# Patient Record
Sex: MALE | Race: ASIAN | NOT HISPANIC OR LATINO | URBAN - METROPOLITAN AREA
[De-identification: names, ages, dates, MRNs, and addresses within clinical notes are randomized per-mention and may not be internally consistent; named-entity substitution may affect disease eponyms.]

---

## 2018-03-19 ENCOUNTER — EMERGENCY (EMERGENCY)
Facility: HOSPITAL | Age: 83
LOS: 1 days | Discharge: HOME | End: 2018-03-19
Attending: EMERGENCY MEDICINE | Admitting: EMERGENCY MEDICINE
Payer: MEDICARE

## 2018-03-19 VITALS
HEART RATE: 65 BPM | RESPIRATION RATE: 18 BRPM | TEMPERATURE: 99 F | DIASTOLIC BLOOD PRESSURE: 65 MMHG | OXYGEN SATURATION: 99 % | SYSTOLIC BLOOD PRESSURE: 138 MMHG

## 2018-03-19 PROCEDURE — 99283 EMERGENCY DEPT VISIT LOW MDM: CPT

## 2018-03-19 NOTE — ED PROVIDER NOTE - ATTENDING CONTRIBUTION TO CARE
DR. HO, ATTENDING MD-  I performed a face to face bedside interview with patient regarding history of present illness, review of symptoms and past medical history. I completed an independent physical exam.  I have discussed patient's plan of care with PA.   Documentation as above in the note.    Kevon: sent from urgent care for evaluation of abnormal EKG in setting of "intractable hiccups" x 1 week.  Patient has no chest pain, no dyspnea, no epigastric pain, no jaw pain, or other signs suggestive to me of ACS.  On EKG, has isolated TWIs in iii and F. In 12/2016 had TWI in III but not F.  I do not feel that the patient is having ACS, but his personal cardiologist will see him tomorrow.  Regarding his hiccups, patient needs a further workup for this and I have explained this to son, including possibly a GI workup for ongoing sx.  But currently is having no hiccups on my exam.  Son reports they are positional, worse when lyign flat, which would further lead me to believe this is not cardiac.  Will d/c home for close outpatient f/u.  On my exam, comfortable appearing, although coughing occasionally.  Lungs clear, no rales.  heart rrr.  No BEULAH

## 2018-03-19 NOTE — ED PROVIDER NOTE - PMH
BPH (benign prostatic hyperplasia)    DM2 (diabetes mellitus, type 2)    HLD (hyperlipidemia)    HTN (hypertension)    Prostate CA

## 2018-03-19 NOTE — ED PROVIDER NOTE - PLAN OF CARE
pls rest, drink plenty of fluids, try to sleep sitting up, f/u with dr. SIDDIQUI in the AM< return for any worsening symptoms or any other concerning symptoms

## 2018-03-19 NOTE — ED PROVIDER NOTE - PROGRESS NOTE DETAILS
DUGLAS VARGAS: I spoke with dr. betancourt his cardiologist, who states that as ekg has nonspecific findings including ITW in v3, unchanged from 1.5 yr ago, pt can be d/c and he will see pt in the office tomorrrow

## 2018-03-19 NOTE — ED PROVIDER NOTE - CARE PLAN
Principal Discharge DX:	Hiccup  Assessment and plan of treatment:	pls rest, drink plenty of fluids, try to sleep sitting up, f/u with dr. SIDDIQUI in the AM< return for any worsening symptoms or any other concerning symptoms

## 2018-03-19 NOTE — ED ADULT TRIAGE NOTE - CHIEF COMPLAINT QUOTE
pt reports hiccups x 7 days saw PMD was given chlorpromazin with no relief. denies chest pain or SOB.

## 2018-03-19 NOTE — ED PROVIDER NOTE - OBJECTIVE STATEMENT
Pt was offered Chinese translational services, prefers for his son to translate.   85y/o M with PMHx of HLD, HTN, DM, BPH, presents to the ED c/o hiccups for 1w. Pt visited to urgent care today, had an EKG performed and was told to present to the ED. Pt was seen by his PMD 5d ago for a cold, was given abx. He had a stress test performed 1w ago. Pt reports his pain is worse with sleeping. Denies chest pain, fevers/chills, nausea/vomiting, SOB, any other complaints. NKDA. Pt was offered Chinese translational services, prefers for his son to translate.   85y/o M with PMHx of HLD, HTN, DM, BPH, presents to the ED c/o hiccups for 1w. Pt visited to urgent care today, had an EKG performed and was told to present to the ED. Pt was seen by his PMD 5d ago for a cold, was given abx, thorazine, which have not helped.  He states that he had a stress test performed 1w ago which was wnl.  Pt reports hhiccuping  is worse with sleeping. Denies any headaches, neck pain, fevers, chills, nausea, vomitting any active chest pain, SOB, abdominal pain, urinary symptoms, any other complaints. NKDA.

## 2019-05-01 PROBLEM — E78.5 HYPERLIPIDEMIA, UNSPECIFIED: Chronic | Status: ACTIVE | Noted: 2018-03-19

## 2019-05-17 ENCOUNTER — APPOINTMENT (OUTPATIENT)
Dept: ORTHOPEDIC SURGERY | Facility: CLINIC | Age: 84
End: 2019-05-17
Payer: MEDICARE

## 2019-05-17 VITALS
WEIGHT: 157 LBS | HEART RATE: 58 BPM | SYSTOLIC BLOOD PRESSURE: 135 MMHG | BODY MASS INDEX: 23.25 KG/M2 | HEIGHT: 69 IN | DIASTOLIC BLOOD PRESSURE: 69 MMHG

## 2019-05-17 PROCEDURE — 73562 X-RAY EXAM OF KNEE 3: CPT | Mod: RT

## 2019-05-17 PROCEDURE — 99204 OFFICE O/P NEW MOD 45 MIN: CPT

## 2019-05-17 PROCEDURE — 72170 X-RAY EXAM OF PELVIS: CPT

## 2019-05-17 NOTE — REVIEW OF SYSTEMS
[Arthralgia] : arthralgia [Joint Stiffness] : joint stiffness [Joint Pain] : joint pain [Joint Swelling] : joint swelling [Negative] : Heme/Lymph

## 2019-05-28 ENCOUNTER — OUTPATIENT (OUTPATIENT)
Dept: OUTPATIENT SERVICES | Facility: HOSPITAL | Age: 84
LOS: 1 days | End: 2019-05-28
Payer: MEDICARE

## 2019-05-28 VITALS
SYSTOLIC BLOOD PRESSURE: 126 MMHG | WEIGHT: 156.09 LBS | HEART RATE: 51 BPM | HEIGHT: 68.5 IN | RESPIRATION RATE: 16 BRPM | OXYGEN SATURATION: 96 % | DIASTOLIC BLOOD PRESSURE: 64 MMHG | TEMPERATURE: 97 F

## 2019-05-28 DIAGNOSIS — Z98.49 CATARACT EXTRACTION STATUS, UNSPECIFIED EYE: Chronic | ICD-10-CM

## 2019-05-28 DIAGNOSIS — R06.83 SNORING: ICD-10-CM

## 2019-05-28 DIAGNOSIS — M17.12 UNILATERAL PRIMARY OSTEOARTHRITIS, LEFT KNEE: ICD-10-CM

## 2019-05-28 DIAGNOSIS — I10 ESSENTIAL (PRIMARY) HYPERTENSION: ICD-10-CM

## 2019-05-28 DIAGNOSIS — E11.9 TYPE 2 DIABETES MELLITUS WITHOUT COMPLICATIONS: ICD-10-CM

## 2019-05-28 LAB
ANION GAP SERPL CALC-SCNC: 17 MMO/L — HIGH (ref 7–14)
APPEARANCE UR: CLEAR — SIGNIFICANT CHANGE UP
BILIRUB UR-MCNC: NEGATIVE — SIGNIFICANT CHANGE UP
BLD GP AB SCN SERPL QL: NEGATIVE — SIGNIFICANT CHANGE UP
BLOOD UR QL VISUAL: NEGATIVE — SIGNIFICANT CHANGE UP
BUN SERPL-MCNC: 27 MG/DL — HIGH (ref 7–23)
CALCIUM SERPL-MCNC: 9.9 MG/DL — SIGNIFICANT CHANGE UP (ref 8.4–10.5)
CHLORIDE SERPL-SCNC: 103 MMOL/L — SIGNIFICANT CHANGE UP (ref 98–107)
CO2 SERPL-SCNC: 23 MMOL/L — SIGNIFICANT CHANGE UP (ref 22–31)
COLOR SPEC: YELLOW — SIGNIFICANT CHANGE UP
CREAT SERPL-MCNC: 1.49 MG/DL — HIGH (ref 0.5–1.3)
GLUCOSE SERPL-MCNC: 212 MG/DL — HIGH (ref 70–99)
GLUCOSE UR-MCNC: 300 — HIGH
HBA1C BLD-MCNC: 7.8 % — HIGH (ref 4–5.6)
HCT VFR BLD CALC: 39.1 % — SIGNIFICANT CHANGE UP (ref 39–50)
HGB BLD-MCNC: 12.4 G/DL — LOW (ref 13–17)
KETONES UR-MCNC: NEGATIVE — SIGNIFICANT CHANGE UP
LEUKOCYTE ESTERASE UR-ACNC: NEGATIVE — SIGNIFICANT CHANGE UP
MCHC RBC-ENTMCNC: 31.5 PG — SIGNIFICANT CHANGE UP (ref 27–34)
MCHC RBC-ENTMCNC: 31.7 % — LOW (ref 32–36)
MCV RBC AUTO: 99.2 FL — SIGNIFICANT CHANGE UP (ref 80–100)
NITRITE UR-MCNC: NEGATIVE — SIGNIFICANT CHANGE UP
NRBC # FLD: 0 K/UL — SIGNIFICANT CHANGE UP (ref 0–0)
PH UR: 5.5 — SIGNIFICANT CHANGE UP (ref 5–8)
PLATELET # BLD AUTO: 172 K/UL — SIGNIFICANT CHANGE UP (ref 150–400)
PMV BLD: 12.1 FL — SIGNIFICANT CHANGE UP (ref 7–13)
POTASSIUM SERPL-MCNC: 3.9 MMOL/L — SIGNIFICANT CHANGE UP (ref 3.5–5.3)
POTASSIUM SERPL-SCNC: 3.9 MMOL/L — SIGNIFICANT CHANGE UP (ref 3.5–5.3)
PROT UR-MCNC: 10 — SIGNIFICANT CHANGE UP
RBC # BLD: 3.94 M/UL — LOW (ref 4.2–5.8)
RBC # FLD: 13 % — SIGNIFICANT CHANGE UP (ref 10.3–14.5)
RH IG SCN BLD-IMP: POSITIVE — SIGNIFICANT CHANGE UP
SODIUM SERPL-SCNC: 143 MMOL/L — SIGNIFICANT CHANGE UP (ref 135–145)
SP GR SPEC: 1.02 — SIGNIFICANT CHANGE UP (ref 1–1.04)
UROBILINOGEN FLD QL: NORMAL — SIGNIFICANT CHANGE UP
WBC # BLD: 5.66 K/UL — SIGNIFICANT CHANGE UP (ref 3.8–10.5)
WBC # FLD AUTO: 5.66 K/UL — SIGNIFICANT CHANGE UP (ref 3.8–10.5)

## 2019-05-28 PROCEDURE — 93010 ELECTROCARDIOGRAM REPORT: CPT

## 2019-05-28 RX ORDER — SODIUM CHLORIDE 9 MG/ML
1000 INJECTION, SOLUTION INTRAVENOUS
Refills: 0 | Status: DISCONTINUED | OUTPATIENT
Start: 2019-07-15 | End: 2019-07-15

## 2019-05-28 RX ORDER — ASPIRIN/CALCIUM CARB/MAGNESIUM 324 MG
1 TABLET ORAL
Qty: 0 | Refills: 0 | DISCHARGE

## 2019-05-28 NOTE — H&P PST ADULT - NEGATIVE CARDIOVASCULAR SYMPTOMS
no palpitations/no peripheral edema/no orthopnea/no chest pain/no dyspnea on exertion/no claudication

## 2019-05-28 NOTE — H&P PST ADULT - GENITOURINARY COMMENTS
October 18, 2018      Jacoby Mujica MD  [address] [place] [address] [place] [facility]  [address] [facility] [address]  Phone: [phone]  Fax: [phone]          Patient: Tello Romero   MR Number: [id number]   YOB: 1999   Date of Visit: 10/18/2018       Dear Dr. Jacoby Mujica:    Thank you for referring Tello Romero to me for evaluation. Attached you will find relevant portions of my assessment and plan of care.    If you have questions, please do not hesitate to call me. I look forward to following Tello Romero along with you.    Sincerely,    Elis Remy MD    Enclosure  CC:  No Recipients    If you would like to receive this communication electronically, please contact [email or web address] or [phone] to request more information on [facility] access.    For providers and/or their staff who would like to refer a patient to [facility], please contact us through our one-stop-shop provider referral line, [facility], at [phone].    If you feel you have received this communication in error or would no longer like to receive these types of communications, please e-mail [email or web address]          hx prostate cancer

## 2019-05-28 NOTE — H&P PST ADULT - SOURCE OF INFORMATION, PROFILE
patient Son Nadir -  as per pt/family/patient patient/Son Nadir -  as per pt -   Christy ID # [id number]/family

## 2019-05-28 NOTE — H&P PST ADULT - RS GEN PE MLT RESP DETAILS PC
breath sounds equal/no rales/clear to auscultation bilaterally/airway patent/no rhonchi/respirations non-labored

## 2019-05-28 NOTE — H&P PST ADULT - NEGATIVE ENMT SYMPTOMS
no nasal congestion/no hearing difficulty/no ear pain/no nasal discharge/no post-nasal discharge/no nose bleeds/no vertigo/no sinus symptoms/no tinnitus

## 2019-05-28 NOTE — H&P PST ADULT - NSANTHOSAYNRD_GEN_A_CORE
No. CLARIBEL screening performed.  STOP BANG Legend: 0-2 = LOW Risk; 3-4 = INTERMEDIATE Risk; 5-8 = HIGH Risk

## 2019-05-28 NOTE — H&P PST ADULT - HISTORY OF PRESENT ILLNESS
85 y/o male with hx HTN, DM Type II, Gerd, Gout, present for preop evaluation for left total knee replacements on 6/4/19. 85 y/o male with hx HTN, DM Type II, Gerd, Gout, present for preop evaluation for left total knee replacements on 6/4/19. Pt states 85 y/o male with hx HTN, DM Type II, Gerd, Gout, present for preop evaluation for left total knee replacements on 6/4/19. Pt has been c/o pain x 3 years, which has gotten significantly worse. Pt was referred to surgeon, had x-ray of left knee & severe OA seen.

## 2019-05-28 NOTE — H&P PST ADULT - PRO PAIN LIFE ADAPT
inability or reluctance to perform ADLs/inability to sleep/decreased activity level/inability to enjoy life

## 2019-05-28 NOTE — H&P PST ADULT - NSICDXPROBLEM_GEN_ALL_CORE_FT
PROBLEM DIAGNOSES  Problem: Osteoarthritis of left knee  Assessment and Plan: scheduled for left total knee replacement on 6/4/19  pending labs & medical clearance   surgical scrub & preop instructions given & explained, pt & his son verbalized understanding    Problem: HTN (hypertension)  Assessment and Plan: instructed to take Losartan & Atenolol AM of surgery with sips of water    Problem: DM (diabetes mellitus)  Assessment and Plan: instructed to take last dose of diabetic meds 6/3/19 & none AM of surgery    Problem: Snores  Assessment and Plan: pt met stop bang criteria for CLARIBEL precautions; OR bookin noitified via fax PROBLEM DIAGNOSES  Problem: Osteoarthritis of left knee  Assessment and Plan: scheduled for left total knee replacement on 6/4/19  pending labs & medical clearance   surgical scrub & preop instructions given & explained, pt & his son verbalized understanding    Problem: HTN (hypertension)  Assessment and Plan: instructed to take Losartan & Atenolol AM of surgery with sips of water    Problem: DM (diabetes mellitus)  Assessment and Plan: instructed to take last dose of diabetic meds 6/3/19 & none AM of surgery  FSBS stat DOS    Problem: Snores  Assessment and Plan: pt met stop bang criteria for CLARIBEL precautions; OR booking noitified via fax

## 2019-05-28 NOTE — H&P PST ADULT - NSICDXPASTMEDICALHX_GEN_ALL_CORE_FT
PAST MEDICAL HISTORY:  BPH (benign prostatic hyperplasia)     DM2 (diabetes mellitus, type 2)     Gout     History of gastroesophageal reflux (GERD)     HLD (hyperlipidemia)     HTN (hypertension)     Prostate CA Radiation seed Implant    Shingles 8 years ago

## 2019-05-29 LAB
SPECIMEN SOURCE: SIGNIFICANT CHANGE UP
SPECIMEN SOURCE: SIGNIFICANT CHANGE UP

## 2019-05-30 LAB
BACTERIA NPH CULT: SIGNIFICANT CHANGE UP
BACTERIA UR CULT: SIGNIFICANT CHANGE UP

## 2019-05-31 ENCOUNTER — RX RENEWAL (OUTPATIENT)
Age: 84
End: 2019-05-31

## 2019-06-03 NOTE — CONSULT LETTER
[Dear  ___] : Dear  [unfilled], [Consult Letter:] : I had the pleasure of evaluating your patient, [unfilled]. [Please see my note below.] : Please see my note below. [Consult Closing:] : Thank you very much for allowing me to participate in the care of this patient.  If you have any questions, please do not hesitate to contact me. [Sincerely,] : Sincerely, [FreeTextEntry3] : Tony Wiggins MD\par \par ______________________________________________\par [facility]: Hip/Knee Arthroplasty\par [address] [facility] [facility] [address], [place] [address]\par (t) [phone]\par (f) [phone]  [FreeTextEntry2] : HANG RCAHEL \par

## 2019-06-03 NOTE — PHYSICAL EXAM
[Antalgic] : antalgic [Ankle] : ankle 2+ and symmetric bilaterally [Knee] : patellar 2+ and symmetric bilaterally [LE] : Sensory: Intact in bilateral lower extremities [DP] : dorsalis pedis 2+ and symmetric bilaterally [PT] : posterior tibial 2+ and symmetric bilaterally [de-identified] : On general examination the patient is adequately groomed and nourished. The vital parameters are as recorded. \par There is no lymphedema or diffuse swelling, no varicose veins, no skin warmth/erythema/scars/swelling, no ulcers and no palpable lymph nodes or masses in both lower extremities. Bilateral pedal pulses are well palpable.\par Upper Extremity:\par Both right and left upper extremities are unremarkable in terms of skin rash, lesions, pigmentation, redness, tenderness, swelling, joint instability, abnormal deformity or crepitus. The overall range of motion, sensation, motor tone and strength testing are normal.\par \par Bilateral Knee Exam: \par The gait is bilateral stiff knee antalgic.\par Knee alignment:            Right 0 degrees varus with no flexion deformity. \par Left 5 degrees varus with no flexion deformity.\par Both knees demonstrate no scars and the skin has no warmth, erythema, swelling or tenderness. \par Both knees have a range of motion of\par Extension:                    Right 0 degrees     Left 0 degrees\par Flexion:                                   Right 90 degrees      Left 110 degrees\par There is bilateral knee medial joint line tenderness. There is bilateral knee mild effusion. \par Justina's test is positive. Inocente test is positive.\par Lachman's test, Anterior/Posterior Drawer test and Pivot Shift Tests are negative. \par There is bilateral knee grade 1 MCL mediolateral laxity and no anteroposterior instability. \par Patella compression test is positive and patellofemoral tracking is normal with no lateral subluxation, apprehension or instability. \par Right knee quadriceps and hamstrings power is 4+.\par Left knee quadriceps and hamstrings power is 4+.\par \par Hip Exam:\par The gait and station is normal\par The patient has equal leg lengths and no pelvic tilt. Solomon/Ca test is 7 inches on the right and 7 inches on the left. Active SLR is 60 degrees on the right and 60 degrees on the left. Both hips demonstrate no scars and the skin has no signs of inflammation or tenderness. \par Both Hips have a normal range of motion of flexion to 100 degrees, abduction 40 degrees, adduction 20 degrees, external rotation 40 degrees, internal rotation 20 degrees with symmetrical motion in flexion and extension. There is no flexion contracture, deformity or instability. Labral impingement tests are negative.\par Both hips flexor, abductor and extensor power is normal. [de-identified] : The following radiographs were ordered and read by me during this patients visit. I reviewed each radiograph in detail with the patient and discussed the findings as highlighted below. \par AP, lateral and skyline views of the bilateral knees confirm advanced degenerative joint disease with medial joint space narrowing and osteophyte formation. Left Knee worse than Right . \par AP view of the pelvis are within normal limits

## 2019-06-03 NOTE — DISCUSSION/SUMMARY
[de-identified] : BIlateral Knee advanced degenerative joint disease, Left worse than Right \par The natural history and treatment of degenerative arthritis was discussed with the patient at length today. The spectrum of treatment including nonoperative modalities to surgical intervention was elucidated. Noninvasive and nonoperative treatment modalities include weight reduction, activity modification with low impact exercise,  as needed use of acetaminophen or anti-inflammatory medications if tolerated, glucosamine/chondroitin supplements, and physical therapy. Further treatments can include corticosteroid injection and the use of viscosupplementation with hyaluronic acid injections. Definitive surgical treatment can certainly include total joint arthroplasty also. The risks and benefits of each treatment options was discussed and all questions were answered.\par In view of lack of adequate pain relief with conservative (non-surgical) management protocol including physical therapy, home exercises, weight loss, activity modification, NSAIDS; the patient is recommended to consider a Left Total Knee Replacement. \par The risks, benefits, alternatives, implications, complications including but not limited to pain, stiffness, bleeding, limp, wound breakdown, infection, bone fracture, nerve and vascular compromise, implant wear, fixation options depending on bone quality, instability, and durability issues and rehabilitation were discussed and relevant questions were addressed. The possibility of recurrent pain, no improvement in pain and actual worsening of the pain were also mentioned in conversation with the patient. Medical complications related to the patient's general medical health including deep vein thrombosis, pulmonary embolus, heart attack, stroke, death and other complications from anesthesia were discussed as well. The patient wishes to proceed and will undergo preoperative medical evaluation and clearance, attend the preoperative educational class and will schedule surgery appropriately.\par Anticoagulation prophylaxis medication options to address risks of deep vein thrombosis and pulmonary embolism were discussed and weighed against the risks of bleeding and wound healing complications. The patient elected aspirin/coumadin prophylaxis with mechanical modalities.

## 2019-06-03 NOTE — HISTORY OF PRESENT ILLNESS
[8] : an average pain level of 8/10 [de-identified] : Mr. MARTHA MELCHOR is a 84 year old male presenting with bilateral knee pain, now worsening. Left knee is worse than right. He localizes the pain to the medial and anterior aspect of the bilateral knee. The patient describes the pain as sharp, and states it is intermittent, based on activity. He states the pain is exacerbated by walking long distance, climbing/descending stairs, and rising from a seated position. He has been taking NSAIDs for pain with only mild temporary relief. He admits to prior conservative management inclusive of physical therapy with only mild improvement in condition. Patient has had a cortisone injection to the Left knee years ago. He denies hip or lower back pain. The patient admits to limitations in there quality of life, and is present to discuss options for treatment. JTM. \par

## 2019-06-10 PROBLEM — B02.9 ZOSTER WITHOUT COMPLICATIONS: Chronic | Status: ACTIVE | Noted: 2019-05-28

## 2019-06-10 PROBLEM — M10.9 GOUT, UNSPECIFIED: Chronic | Status: ACTIVE | Noted: 2019-05-28

## 2019-06-10 PROBLEM — Z87.19 PERSONAL HISTORY OF OTHER DISEASES OF THE DIGESTIVE SYSTEM: Chronic | Status: ACTIVE | Noted: 2019-05-28

## 2019-07-02 ENCOUNTER — OUTPATIENT (OUTPATIENT)
Dept: OUTPATIENT SERVICES | Facility: HOSPITAL | Age: 84
LOS: 1 days | End: 2019-07-02

## 2019-07-02 VITALS
HEART RATE: 51 BPM | WEIGHT: 158.07 LBS | OXYGEN SATURATION: 98 % | HEIGHT: 67 IN | DIASTOLIC BLOOD PRESSURE: 68 MMHG | RESPIRATION RATE: 14 BRPM | SYSTOLIC BLOOD PRESSURE: 128 MMHG | TEMPERATURE: 97 F

## 2019-07-02 DIAGNOSIS — M19.90 UNSPECIFIED OSTEOARTHRITIS, UNSPECIFIED SITE: ICD-10-CM

## 2019-07-02 DIAGNOSIS — M17.11 UNILATERAL PRIMARY OSTEOARTHRITIS, RIGHT KNEE: ICD-10-CM

## 2019-07-02 DIAGNOSIS — Z98.49 CATARACT EXTRACTION STATUS, UNSPECIFIED EYE: Chronic | ICD-10-CM

## 2019-07-02 LAB
ANION GAP SERPL CALC-SCNC: 15 MMO/L — HIGH (ref 7–14)
APPEARANCE UR: CLEAR — SIGNIFICANT CHANGE UP
BILIRUB UR-MCNC: NEGATIVE — SIGNIFICANT CHANGE UP
BLD GP AB SCN SERPL QL: NEGATIVE — SIGNIFICANT CHANGE UP
BLOOD UR QL VISUAL: NEGATIVE — SIGNIFICANT CHANGE UP
BUN SERPL-MCNC: 21 MG/DL — SIGNIFICANT CHANGE UP (ref 7–23)
CALCIUM SERPL-MCNC: 9.6 MG/DL — SIGNIFICANT CHANGE UP (ref 8.4–10.5)
CHLORIDE SERPL-SCNC: 106 MMOL/L — SIGNIFICANT CHANGE UP (ref 98–107)
CO2 SERPL-SCNC: 22 MMOL/L — SIGNIFICANT CHANGE UP (ref 22–31)
COLOR SPEC: SIGNIFICANT CHANGE UP
CREAT SERPL-MCNC: 1.22 MG/DL — SIGNIFICANT CHANGE UP (ref 0.5–1.3)
GLUCOSE SERPL-MCNC: 134 MG/DL — HIGH (ref 70–99)
GLUCOSE UR-MCNC: NEGATIVE — SIGNIFICANT CHANGE UP
HBA1C BLD-MCNC: 7 % — HIGH (ref 4–5.6)
HCT VFR BLD CALC: 41 % — SIGNIFICANT CHANGE UP (ref 39–50)
HGB BLD-MCNC: 13.4 G/DL — SIGNIFICANT CHANGE UP (ref 13–17)
KETONES UR-MCNC: NEGATIVE — SIGNIFICANT CHANGE UP
LEUKOCYTE ESTERASE UR-ACNC: NEGATIVE — SIGNIFICANT CHANGE UP
MCHC RBC-ENTMCNC: 31.8 PG — SIGNIFICANT CHANGE UP (ref 27–34)
MCHC RBC-ENTMCNC: 32.7 % — SIGNIFICANT CHANGE UP (ref 32–36)
MCV RBC AUTO: 97.2 FL — SIGNIFICANT CHANGE UP (ref 80–100)
NITRITE UR-MCNC: NEGATIVE — SIGNIFICANT CHANGE UP
NRBC # FLD: 0 K/UL — SIGNIFICANT CHANGE UP (ref 0–0)
PH UR: 6 — SIGNIFICANT CHANGE UP (ref 5–8)
PLATELET # BLD AUTO: 157 K/UL — SIGNIFICANT CHANGE UP (ref 150–400)
PMV BLD: 12 FL — SIGNIFICANT CHANGE UP (ref 7–13)
POTASSIUM SERPL-MCNC: 3.6 MMOL/L — SIGNIFICANT CHANGE UP (ref 3.5–5.3)
POTASSIUM SERPL-SCNC: 3.6 MMOL/L — SIGNIFICANT CHANGE UP (ref 3.5–5.3)
PROT UR-MCNC: 10 — SIGNIFICANT CHANGE UP
RBC # BLD: 4.22 M/UL — SIGNIFICANT CHANGE UP (ref 4.2–5.8)
RBC # FLD: 13.2 % — SIGNIFICANT CHANGE UP (ref 10.3–14.5)
RH IG SCN BLD-IMP: POSITIVE — SIGNIFICANT CHANGE UP
SODIUM SERPL-SCNC: 143 MMOL/L — SIGNIFICANT CHANGE UP (ref 135–145)
SP GR SPEC: 1.02 — SIGNIFICANT CHANGE UP (ref 1–1.04)
UROBILINOGEN FLD QL: NORMAL — SIGNIFICANT CHANGE UP
WBC # BLD: 6.5 K/UL — SIGNIFICANT CHANGE UP (ref 3.8–10.5)
WBC # FLD AUTO: 6.5 K/UL — SIGNIFICANT CHANGE UP (ref 3.8–10.5)

## 2019-07-02 RX ORDER — ASPIRIN/CALCIUM CARB/MAGNESIUM 324 MG
1 TABLET ORAL
Qty: 0 | Refills: 0 | DISCHARGE

## 2019-07-02 RX ORDER — PIOGLITAZONE HYDROCHLORIDE 15 MG/1
1 TABLET ORAL
Qty: 0 | Refills: 0 | DISCHARGE

## 2019-07-02 RX ORDER — SODIUM CHLORIDE 9 MG/ML
3 INJECTION INTRAMUSCULAR; INTRAVENOUS; SUBCUTANEOUS EVERY 8 HOURS
Refills: 0 | Status: DISCONTINUED | OUTPATIENT
Start: 2019-07-15 | End: 2019-07-17

## 2019-07-02 NOTE — H&P PST ADULT - NEGATIVE GASTROINTESTINAL SYMPTOMS
no melena/no change in bowel habits/no constipation/no nausea/no abdominal pain/no vomiting/no diarrhea

## 2019-07-02 NOTE — H&P PST ADULT - NSICDXPROBLEM_GEN_ALL_CORE_FT
PROBLEM DIAGNOSES  Problem: Osteoarthritis  Assessment and Plan: Pt scheduled for left knee replacement on 7/15/2019.  labs done results pending, ekg in chart from 5/2019.  hibiclens provided:  verbal and written instructions provided, with teach back, pt able to verbalize understanding.  Preop teaching done, pt able to verbalize understanding.       meds day of surgery:  atenolol, omeprazole, losartan     medial and cardiology eval in chart.

## 2019-07-02 NOTE — H&P PST ADULT - CONSTITUTIONAL DETAILS
well-nourished/left knee/well-developed/well-groomed/distress due to pain well-groomed/no distress/well-developed/well-nourished

## 2019-07-02 NOTE — H&P PST ADULT - NEGATIVE BREAST SYMPTOMS
no breast tenderness L/no breast tenderness R/no nipple discharge L/no breast lump L/no breast lump R/no nipple discharge R

## 2019-07-02 NOTE — H&P PST ADULT - RS GEN PE MLT RESP DETAILS PC
clear to auscultation bilaterally/breath sounds equal/airway patent/no rhonchi/no rales/respirations non-labored clear to auscultation bilaterally/breath sounds equal/good air movement/no chest wall tenderness/no intercostal retractions/no rales/no rhonchi/no wheezes/respirations non-labored

## 2019-07-02 NOTE — H&P PST ADULT - NEGATIVE ENMT SYMPTOMS
no tinnitus/no nasal discharge/no post-nasal discharge/no nose bleeds/no dry mouth/no dysphagia/no nasal congestion/no throat pain/no vertigo/no sinus symptoms/no ear pain

## 2019-07-02 NOTE — H&P PST ADULT - NEGATIVE CARDIOVASCULAR SYMPTOMS
no orthopnea/no paroxysmal nocturnal dyspnea/no peripheral edema/no chest pain/no claudication/no palpitations/no dyspnea on exertion

## 2019-07-02 NOTE — H&P PST ADULT - PRO PAIN LIFE ADAPT
decreased activity level/inability to enjoy life/inability or reluctance to perform ADLs/inability to sleep

## 2019-07-02 NOTE — H&P PST ADULT - NEGATIVE GENERAL SYMPTOMS
no malaise/no anorexia/no chills/no sweating/no weight loss/no fever/no weight gain/no polyphagia/no polyuria/no polydipsia/no fatigue

## 2019-07-02 NOTE — H&P PST ADULT - GASTROINTESTINAL DETAILS
no masses palpable/nontender/no distention/bowel sounds normal/soft/no guarding nontender/no distention/no guarding/no organomegaly/bowel sounds normal/no rigidity/no bruit/no masses palpable/soft

## 2019-07-02 NOTE — H&P PST ADULT - MUSCULOSKELETAL COMMENTS
left knee pain with rom, at night left foot discomfort left knee pain with rom, Positive crepitous, arthritic deformity

## 2019-07-02 NOTE — H&P PST ADULT - HISTORY OF PRESENT ILLNESS
83y/o male scheduled for left total knee replacement on 7/15/2019.  Pt states, "left knee pain for the past 3 yrs, xray shows bone on bone."  Was previously scheduled for 6/2019 cancelled due to pending cardiology evaluation.

## 2019-07-03 LAB — SPECIMEN SOURCE: SIGNIFICANT CHANGE UP

## 2019-07-04 LAB
BACTERIA UR CULT: SIGNIFICANT CHANGE UP
SPECIMEN SOURCE: SIGNIFICANT CHANGE UP

## 2019-07-05 LAB — BACTERIA NPH CULT: SIGNIFICANT CHANGE UP

## 2019-07-09 ENCOUNTER — OTHER (OUTPATIENT)
Age: 84
End: 2019-07-09

## 2019-07-10 ENCOUNTER — OTHER (OUTPATIENT)
Age: 84
End: 2019-07-10

## 2019-07-10 DIAGNOSIS — M17.12 UNILATERAL PRIMARY OSTEOARTHRITIS, LEFT KNEE: ICD-10-CM

## 2019-07-10 DIAGNOSIS — M17.11 UNILATERAL PRIMARY OSTEOARTHRITIS, RIGHT KNEE: ICD-10-CM

## 2019-07-14 ENCOUNTER — TRANSCRIPTION ENCOUNTER (OUTPATIENT)
Age: 84
End: 2019-07-14

## 2019-07-15 ENCOUNTER — INPATIENT (INPATIENT)
Facility: HOSPITAL | Age: 84
LOS: 1 days | Discharge: NURSING, REHAB OR LONG-TERM CARE | End: 2019-07-17
Attending: ORTHOPAEDIC SURGERY | Admitting: ORTHOPAEDIC SURGERY
Payer: MEDICARE

## 2019-07-15 ENCOUNTER — RESULT REVIEW (OUTPATIENT)
Age: 84
End: 2019-07-15

## 2019-07-15 ENCOUNTER — APPOINTMENT (OUTPATIENT)
Dept: ORTHOPEDIC SURGERY | Facility: HOSPITAL | Age: 84
End: 2019-07-15

## 2019-07-15 VITALS
WEIGHT: 158.07 LBS | TEMPERATURE: 98 F | SYSTOLIC BLOOD PRESSURE: 151 MMHG | HEIGHT: 67 IN | DIASTOLIC BLOOD PRESSURE: 60 MMHG | RESPIRATION RATE: 15 BRPM | HEART RATE: 46 BPM | OXYGEN SATURATION: 98 %

## 2019-07-15 DIAGNOSIS — M17.12 UNILATERAL PRIMARY OSTEOARTHRITIS, LEFT KNEE: ICD-10-CM

## 2019-07-15 DIAGNOSIS — Z98.49 CATARACT EXTRACTION STATUS, UNSPECIFIED EYE: Chronic | ICD-10-CM

## 2019-07-15 LAB
ANION GAP SERPL CALC-SCNC: 11 MMO/L — SIGNIFICANT CHANGE UP (ref 7–14)
BUN SERPL-MCNC: 22 MG/DL — SIGNIFICANT CHANGE UP (ref 7–23)
CALCIUM SERPL-MCNC: 9.3 MG/DL — SIGNIFICANT CHANGE UP (ref 8.4–10.5)
CHLORIDE SERPL-SCNC: 106 MMOL/L — SIGNIFICANT CHANGE UP (ref 98–107)
CO2 SERPL-SCNC: 27 MMOL/L — SIGNIFICANT CHANGE UP (ref 22–31)
CREAT SERPL-MCNC: 1.29 MG/DL — SIGNIFICANT CHANGE UP (ref 0.5–1.3)
GLUCOSE BLDC GLUCOMTR-MCNC: 129 MG/DL — HIGH (ref 70–99)
GLUCOSE BLDC GLUCOMTR-MCNC: 131 MG/DL — HIGH (ref 70–99)
GLUCOSE BLDC GLUCOMTR-MCNC: 246 MG/DL — HIGH (ref 70–99)
GLUCOSE SERPL-MCNC: 139 MG/DL — HIGH (ref 70–99)
HCT VFR BLD CALC: 39.2 % — SIGNIFICANT CHANGE UP (ref 39–50)
HGB BLD-MCNC: 13.2 G/DL — SIGNIFICANT CHANGE UP (ref 13–17)
MCHC RBC-ENTMCNC: 32.9 PG — SIGNIFICANT CHANGE UP (ref 27–34)
MCHC RBC-ENTMCNC: 33.7 % — SIGNIFICANT CHANGE UP (ref 32–36)
MCV RBC AUTO: 97.8 FL — SIGNIFICANT CHANGE UP (ref 80–100)
NRBC # FLD: 0 K/UL — SIGNIFICANT CHANGE UP (ref 0–0)
PLATELET # BLD AUTO: 153 K/UL — SIGNIFICANT CHANGE UP (ref 150–400)
PMV BLD: 11.5 FL — SIGNIFICANT CHANGE UP (ref 7–13)
POTASSIUM SERPL-MCNC: 4.5 MMOL/L — SIGNIFICANT CHANGE UP (ref 3.5–5.3)
POTASSIUM SERPL-SCNC: 4.5 MMOL/L — SIGNIFICANT CHANGE UP (ref 3.5–5.3)
RBC # BLD: 4.01 M/UL — LOW (ref 4.2–5.8)
RBC # FLD: 12.9 % — SIGNIFICANT CHANGE UP (ref 10.3–14.5)
SODIUM SERPL-SCNC: 144 MMOL/L — SIGNIFICANT CHANGE UP (ref 135–145)
WBC # BLD: 7.46 K/UL — SIGNIFICANT CHANGE UP (ref 3.8–10.5)
WBC # FLD AUTO: 7.46 K/UL — SIGNIFICANT CHANGE UP (ref 3.8–10.5)

## 2019-07-15 PROCEDURE — 27447 TOTAL KNEE ARTHROPLASTY: CPT | Mod: LT

## 2019-07-15 PROCEDURE — 88305 TISSUE EXAM BY PATHOLOGIST: CPT | Mod: 26

## 2019-07-15 PROCEDURE — 88311 DECALCIFY TISSUE: CPT | Mod: 26

## 2019-07-15 PROCEDURE — 73560 X-RAY EXAM OF KNEE 1 OR 2: CPT | Mod: 26,LT

## 2019-07-15 RX ORDER — ATENOLOL 25 MG/1
1 TABLET ORAL
Qty: 0 | Refills: 0 | DISCHARGE

## 2019-07-15 RX ORDER — HYDROMORPHONE HYDROCHLORIDE 2 MG/ML
0.8 INJECTION INTRAMUSCULAR; INTRAVENOUS; SUBCUTANEOUS
Refills: 0 | Status: DISCONTINUED | OUTPATIENT
Start: 2019-07-15 | End: 2019-07-15

## 2019-07-15 RX ORDER — ATORVASTATIN CALCIUM 80 MG/1
10 TABLET, FILM COATED ORAL AT BEDTIME
Refills: 0 | Status: DISCONTINUED | OUTPATIENT
Start: 2019-07-15 | End: 2019-07-17

## 2019-07-15 RX ORDER — OXYCODONE HYDROCHLORIDE 5 MG/1
10 TABLET ORAL EVERY 4 HOURS
Refills: 0 | Status: DISCONTINUED | OUTPATIENT
Start: 2019-07-15 | End: 2019-07-17

## 2019-07-15 RX ORDER — SODIUM CHLORIDE 9 MG/ML
1000 INJECTION INTRAMUSCULAR; INTRAVENOUS; SUBCUTANEOUS
Refills: 0 | Status: DISCONTINUED | OUTPATIENT
Start: 2019-07-15 | End: 2019-07-17

## 2019-07-15 RX ORDER — DEXTROSE 50 % IN WATER 50 %
15 SYRINGE (ML) INTRAVENOUS ONCE
Refills: 0 | Status: DISCONTINUED | OUTPATIENT
Start: 2019-07-15 | End: 2019-07-17

## 2019-07-15 RX ORDER — TRAMADOL HYDROCHLORIDE 50 MG/1
50 TABLET ORAL EVERY 8 HOURS
Refills: 0 | Status: DISCONTINUED | OUTPATIENT
Start: 2019-07-15 | End: 2019-07-17

## 2019-07-15 RX ORDER — PANTOPRAZOLE SODIUM 20 MG/1
40 TABLET, DELAYED RELEASE ORAL
Refills: 0 | Status: DISCONTINUED | OUTPATIENT
Start: 2019-07-15 | End: 2019-07-17

## 2019-07-15 RX ORDER — CEFAZOLIN SODIUM 1 G
2000 VIAL (EA) INJECTION EVERY 8 HOURS
Refills: 0 | Status: COMPLETED | OUTPATIENT
Start: 2019-07-15 | End: 2019-07-16

## 2019-07-15 RX ORDER — ALLOPURINOL 300 MG
100 TABLET ORAL DAILY
Refills: 0 | Status: DISCONTINUED | OUTPATIENT
Start: 2019-07-15 | End: 2019-07-17

## 2019-07-15 RX ORDER — ACETAMINOPHEN 500 MG
975 TABLET ORAL EVERY 8 HOURS
Refills: 0 | Status: DISCONTINUED | OUTPATIENT
Start: 2019-07-15 | End: 2019-07-17

## 2019-07-15 RX ORDER — DEXTROSE 50 % IN WATER 50 %
25 SYRINGE (ML) INTRAVENOUS ONCE
Refills: 0 | Status: DISCONTINUED | OUTPATIENT
Start: 2019-07-15 | End: 2019-07-17

## 2019-07-15 RX ORDER — HYDROMORPHONE HYDROCHLORIDE 2 MG/ML
0.4 INJECTION INTRAMUSCULAR; INTRAVENOUS; SUBCUTANEOUS
Refills: 0 | Status: DISCONTINUED | OUTPATIENT
Start: 2019-07-15 | End: 2019-07-15

## 2019-07-15 RX ORDER — OMEPRAZOLE 10 MG/1
1 CAPSULE, DELAYED RELEASE ORAL
Qty: 0 | Refills: 0 | DISCHARGE

## 2019-07-15 RX ORDER — SODIUM CHLORIDE 9 MG/ML
1000 INJECTION INTRAMUSCULAR; INTRAVENOUS; SUBCUTANEOUS
Refills: 0 | Status: DISCONTINUED | OUTPATIENT
Start: 2019-07-15 | End: 2019-07-15

## 2019-07-15 RX ORDER — ACETAMINOPHEN 500 MG
975 TABLET ORAL ONCE
Refills: 0 | Status: COMPLETED | OUTPATIENT
Start: 2019-07-15 | End: 2019-07-15

## 2019-07-15 RX ORDER — SODIUM CHLORIDE 9 MG/ML
1000 INJECTION, SOLUTION INTRAVENOUS ONCE
Refills: 0 | Status: COMPLETED | OUTPATIENT
Start: 2019-07-15 | End: 2019-07-16

## 2019-07-15 RX ORDER — GABAPENTIN 400 MG/1
100 CAPSULE ORAL ONCE
Refills: 0 | Status: COMPLETED | OUTPATIENT
Start: 2019-07-15 | End: 2019-07-15

## 2019-07-15 RX ORDER — ALLOPURINOL 300 MG
1 TABLET ORAL
Qty: 0 | Refills: 0 | DISCHARGE

## 2019-07-15 RX ORDER — ERGOCALCIFEROL 1.25 MG/1
1 CAPSULE ORAL
Qty: 0 | Refills: 0 | DISCHARGE

## 2019-07-15 RX ORDER — LOSARTAN POTASSIUM 100 MG/1
50 TABLET, FILM COATED ORAL DAILY
Refills: 0 | Status: DISCONTINUED | OUTPATIENT
Start: 2019-07-15 | End: 2019-07-17

## 2019-07-15 RX ORDER — DOCUSATE SODIUM 100 MG
100 CAPSULE ORAL THREE TIMES A DAY
Refills: 0 | Status: DISCONTINUED | OUTPATIENT
Start: 2019-07-15 | End: 2019-07-17

## 2019-07-15 RX ORDER — PANTOPRAZOLE SODIUM 20 MG/1
40 TABLET, DELAYED RELEASE ORAL ONCE
Refills: 0 | Status: COMPLETED | OUTPATIENT
Start: 2019-07-15 | End: 2019-07-15

## 2019-07-15 RX ORDER — PREGABALIN 225 MG/1
1 CAPSULE ORAL
Qty: 0 | Refills: 0 | DISCHARGE

## 2019-07-15 RX ORDER — SITAGLIPTIN 50 MG/1
1 TABLET, FILM COATED ORAL
Qty: 0 | Refills: 0 | DISCHARGE

## 2019-07-15 RX ORDER — OXYCODONE HYDROCHLORIDE 5 MG/1
5 TABLET ORAL EVERY 4 HOURS
Refills: 0 | Status: DISCONTINUED | OUTPATIENT
Start: 2019-07-15 | End: 2019-07-17

## 2019-07-15 RX ORDER — TRAMADOL HYDROCHLORIDE 50 MG/1
50 TABLET ORAL ONCE
Refills: 0 | Status: DISCONTINUED | OUTPATIENT
Start: 2019-07-15 | End: 2019-07-15

## 2019-07-15 RX ORDER — SENNA PLUS 8.6 MG/1
2 TABLET ORAL AT BEDTIME
Refills: 0 | Status: DISCONTINUED | OUTPATIENT
Start: 2019-07-15 | End: 2019-07-17

## 2019-07-15 RX ORDER — GLUCAGON INJECTION, SOLUTION 0.5 MG/.1ML
1 INJECTION, SOLUTION SUBCUTANEOUS ONCE
Refills: 0 | Status: DISCONTINUED | OUTPATIENT
Start: 2019-07-15 | End: 2019-07-17

## 2019-07-15 RX ORDER — MAGNESIUM HYDROXIDE 400 MG/1
30 TABLET, CHEWABLE ORAL DAILY
Refills: 0 | Status: DISCONTINUED | OUTPATIENT
Start: 2019-07-15 | End: 2019-07-17

## 2019-07-15 RX ORDER — POLYETHYLENE GLYCOL 3350 17 G/17G
17 POWDER, FOR SOLUTION ORAL DAILY
Refills: 0 | Status: DISCONTINUED | OUTPATIENT
Start: 2019-07-15 | End: 2019-07-17

## 2019-07-15 RX ORDER — ATENOLOL 25 MG/1
50 TABLET ORAL DAILY
Refills: 0 | Status: DISCONTINUED | OUTPATIENT
Start: 2019-07-15 | End: 2019-07-17

## 2019-07-15 RX ORDER — INSULIN LISPRO 100/ML
VIAL (ML) SUBCUTANEOUS AT BEDTIME
Refills: 0 | Status: DISCONTINUED | OUTPATIENT
Start: 2019-07-15 | End: 2019-07-17

## 2019-07-15 RX ORDER — INSULIN LISPRO 100/ML
VIAL (ML) SUBCUTANEOUS
Refills: 0 | Status: DISCONTINUED | OUTPATIENT
Start: 2019-07-15 | End: 2019-07-17

## 2019-07-15 RX ORDER — ASPIRIN/CALCIUM CARB/MAGNESIUM 324 MG
325 TABLET ORAL
Refills: 0 | Status: DISCONTINUED | OUTPATIENT
Start: 2019-07-15 | End: 2019-07-17

## 2019-07-15 RX ORDER — LOSARTAN POTASSIUM 100 MG/1
1 TABLET, FILM COATED ORAL
Qty: 0 | Refills: 0 | DISCHARGE

## 2019-07-15 RX ORDER — ATORVASTATIN CALCIUM 80 MG/1
1 TABLET, FILM COATED ORAL
Qty: 0 | Refills: 0 | DISCHARGE

## 2019-07-15 RX ORDER — DEXTROSE 50 % IN WATER 50 %
12.5 SYRINGE (ML) INTRAVENOUS ONCE
Refills: 0 | Status: DISCONTINUED | OUTPATIENT
Start: 2019-07-15 | End: 2019-07-17

## 2019-07-15 RX ORDER — SODIUM CHLORIDE 9 MG/ML
1000 INJECTION, SOLUTION INTRAVENOUS
Refills: 0 | Status: DISCONTINUED | OUTPATIENT
Start: 2019-07-15 | End: 2019-07-17

## 2019-07-15 RX ORDER — ONDANSETRON 8 MG/1
4 TABLET, FILM COATED ORAL EVERY 6 HOURS
Refills: 0 | Status: DISCONTINUED | OUTPATIENT
Start: 2019-07-15 | End: 2019-07-17

## 2019-07-15 RX ORDER — SODIUM CHLORIDE 9 MG/ML
1000 INJECTION, SOLUTION INTRAVENOUS ONCE
Refills: 0 | Status: COMPLETED | OUTPATIENT
Start: 2019-07-15 | End: 2019-07-15

## 2019-07-15 RX ADMIN — SODIUM CHLORIDE 75 MILLILITER(S): 9 INJECTION INTRAMUSCULAR; INTRAVENOUS; SUBCUTANEOUS at 22:54

## 2019-07-15 RX ADMIN — TRAMADOL HYDROCHLORIDE 50 MILLIGRAM(S): 50 TABLET ORAL at 22:52

## 2019-07-15 RX ADMIN — SODIUM CHLORIDE 150 MILLILITER(S): 9 INJECTION INTRAMUSCULAR; INTRAVENOUS; SUBCUTANEOUS at 13:00

## 2019-07-15 RX ADMIN — Medication 975 MILLIGRAM(S): at 22:51

## 2019-07-15 RX ADMIN — SODIUM CHLORIDE 3 MILLILITER(S): 9 INJECTION INTRAMUSCULAR; INTRAVENOUS; SUBCUTANEOUS at 21:09

## 2019-07-15 RX ADMIN — ATORVASTATIN CALCIUM 10 MILLIGRAM(S): 80 TABLET, FILM COATED ORAL at 22:52

## 2019-07-15 RX ADMIN — Medication 100 MILLIGRAM(S): at 22:52

## 2019-07-15 RX ADMIN — SODIUM CHLORIDE 1000 MILLILITER(S): 9 INJECTION, SOLUTION INTRAVENOUS at 17:23

## 2019-07-15 RX ADMIN — TRAMADOL HYDROCHLORIDE 50 MILLIGRAM(S): 50 TABLET ORAL at 07:14

## 2019-07-15 RX ADMIN — GABAPENTIN 100 MILLIGRAM(S): 400 CAPSULE ORAL at 07:13

## 2019-07-15 RX ADMIN — Medication 975 MILLIGRAM(S): at 07:14

## 2019-07-15 RX ADMIN — PANTOPRAZOLE SODIUM 40 MILLIGRAM(S): 20 TABLET, DELAYED RELEASE ORAL at 07:13

## 2019-07-15 RX ADMIN — TRAMADOL HYDROCHLORIDE 50 MILLIGRAM(S): 50 TABLET ORAL at 14:58

## 2019-07-15 RX ADMIN — Medication 325 MILLIGRAM(S): at 22:52

## 2019-07-15 RX ADMIN — Medication 150 MILLIGRAM(S): at 22:52

## 2019-07-15 RX ADMIN — SODIUM CHLORIDE 150 MILLILITER(S): 9 INJECTION INTRAMUSCULAR; INTRAVENOUS; SUBCUTANEOUS at 17:23

## 2019-07-15 RX ADMIN — Medication 100 MILLIGRAM(S): at 14:58

## 2019-07-15 RX ADMIN — Medication 975 MILLIGRAM(S): at 14:59

## 2019-07-15 RX ADMIN — Medication 100 MILLIGRAM(S): at 18:42

## 2019-07-15 NOTE — PHYSICAL THERAPY INITIAL EVALUATION ADULT - RANGE OF MOTION EXAMINATION, REHAB EVAL
bilateral upper extremity ROM was WFL (within functional limits)/deficits as listed below/Right LE ROM was WFL (within functional limits)/L knee/: 0-75

## 2019-07-15 NOTE — PHYSICAL THERAPY INITIAL EVALUATION ADULT - PLANNED THERAPY INTERVENTIONS, PT EVAL
transfer training/balance training/bed mobility training/gait training/stair training/strengthening/ROM

## 2019-07-15 NOTE — PATIENT PROFILE ADULT - SAFE PLACE TO LIVE
Repeat BMP resulted. ED tech perform repeat vital signs. Chris LOPEZ remove pt IV and discharge pt. ED RN not present. Pt awaiting CT. Daughter at bedside. Resting comfortably Pt return from Xray. Repeat vital signs indicate hypotension. Pt reports low BP systolic in 90s at baseline. Second liter normal saline administered. CT resulted. Daughter at bedside. Pt reports decrease in pain. Pt to CT no

## 2019-07-15 NOTE — PROGRESS NOTE ADULT - SUBJECTIVE AND OBJECTIVE BOX
Ortho PA Post Op Check    Patient resting comfortably, pain controlled, no acute events.  Patient denies any chest pain, shortness of breath, nausea, vomiting, diarrhea, headache or dizziness.  Patient currently has no complaints.    Vital Signs: Vital Signs Last 24 Hrs  T(C): 36.7 (15 Jul 2019 14:00), Max: 36.7 (15 Jul 2019 12:00)  T(F): 98 (15 Jul 2019 14:00), Max: 98.1 (15 Jul 2019 12:00)  HR: 55 (15 Jul 2019 17:08) (45 - 55)  BP: 154/67 (15 Jul 2019 17:08) (131/65 - 161/76)  BP(mean): 88 (15 Jul 2019 16:00) (84 - 101)  RR: 15 (15 Jul 2019 16:00) (12 - 22)  SpO2: 98% (15 Jul 2019 17:08) (96% - 100%)    Gen: Alert, NAD  LLE: HV unsewn inplace to gravity, dressing clean, dry and intact, no erythema.  Extremity warm, brisk capillary refill, compartments soft, +1 DP pulse, no calf tenderness.  Intact EHL/FHL/GS/TA, SILT S/S/DP/SP/T    Labs:                       13.2   7.46  )-----------( 153      ( 15 Jul 2019 12:37 )             39.2   07-15    144  |  106  |  22  ----------------------------<  139<H>  4.5   |  27  |  1.29    Ca    9.3      15 Jul 2019 12:37      A/P: 84y Male s/p left TKA  1. Pain management  2. Ancef x24 hours  3. WBAT   4. PT/OT  5. ASA BID for DVT ppx   6. Venodynes  7. Incentive spirometry  8. F/u HV output  9. D/c planning

## 2019-07-16 ENCOUNTER — TRANSCRIPTION ENCOUNTER (OUTPATIENT)
Age: 84
End: 2019-07-16

## 2019-07-16 DIAGNOSIS — Z29.9 ENCOUNTER FOR PROPHYLACTIC MEASURES, UNSPECIFIED: ICD-10-CM

## 2019-07-16 DIAGNOSIS — I10 ESSENTIAL (PRIMARY) HYPERTENSION: ICD-10-CM

## 2019-07-16 DIAGNOSIS — E78.5 HYPERLIPIDEMIA, UNSPECIFIED: ICD-10-CM

## 2019-07-16 DIAGNOSIS — D72.829 ELEVATED WHITE BLOOD CELL COUNT, UNSPECIFIED: ICD-10-CM

## 2019-07-16 DIAGNOSIS — D62 ACUTE POSTHEMORRHAGIC ANEMIA: ICD-10-CM

## 2019-07-16 DIAGNOSIS — Z96.651 PRESENCE OF RIGHT ARTIFICIAL KNEE JOINT: ICD-10-CM

## 2019-07-16 LAB
ANION GAP SERPL CALC-SCNC: 14 MMO/L — SIGNIFICANT CHANGE UP (ref 7–14)
BUN SERPL-MCNC: 22 MG/DL — SIGNIFICANT CHANGE UP (ref 7–23)
CALCIUM SERPL-MCNC: 9 MG/DL — SIGNIFICANT CHANGE UP (ref 8.4–10.5)
CHLORIDE SERPL-SCNC: 106 MMOL/L — SIGNIFICANT CHANGE UP (ref 98–107)
CO2 SERPL-SCNC: 22 MMOL/L — SIGNIFICANT CHANGE UP (ref 22–31)
CREAT SERPL-MCNC: 1.17 MG/DL — SIGNIFICANT CHANGE UP (ref 0.5–1.3)
GLUCOSE BLDC GLUCOMTR-MCNC: 179 MG/DL — HIGH (ref 70–99)
GLUCOSE BLDC GLUCOMTR-MCNC: 185 MG/DL — HIGH (ref 70–99)
GLUCOSE BLDC GLUCOMTR-MCNC: 199 MG/DL — HIGH (ref 70–99)
GLUCOSE BLDC GLUCOMTR-MCNC: 275 MG/DL — HIGH (ref 70–99)
GLUCOSE SERPL-MCNC: 190 MG/DL — HIGH (ref 70–99)
HCT VFR BLD CALC: 36.7 % — LOW (ref 39–50)
HGB BLD-MCNC: 12.3 G/DL — LOW (ref 13–17)
MCHC RBC-ENTMCNC: 32.8 PG — SIGNIFICANT CHANGE UP (ref 27–34)
MCHC RBC-ENTMCNC: 33.5 % — SIGNIFICANT CHANGE UP (ref 32–36)
MCV RBC AUTO: 97.9 FL — SIGNIFICANT CHANGE UP (ref 80–100)
NRBC # FLD: 0 K/UL — SIGNIFICANT CHANGE UP (ref 0–0)
PLATELET # BLD AUTO: 149 K/UL — LOW (ref 150–400)
PMV BLD: 12.2 FL — SIGNIFICANT CHANGE UP (ref 7–13)
POTASSIUM SERPL-MCNC: 4.5 MMOL/L — SIGNIFICANT CHANGE UP (ref 3.5–5.3)
POTASSIUM SERPL-SCNC: 4.5 MMOL/L — SIGNIFICANT CHANGE UP (ref 3.5–5.3)
RBC # BLD: 3.75 M/UL — LOW (ref 4.2–5.8)
RBC # FLD: 12.9 % — SIGNIFICANT CHANGE UP (ref 10.3–14.5)
SODIUM SERPL-SCNC: 142 MMOL/L — SIGNIFICANT CHANGE UP (ref 135–145)
WBC # BLD: 17.54 K/UL — HIGH (ref 3.8–10.5)
WBC # FLD AUTO: 17.54 K/UL — HIGH (ref 3.8–10.5)

## 2019-07-16 PROCEDURE — 99223 1ST HOSP IP/OBS HIGH 75: CPT

## 2019-07-16 RX ORDER — DOCUSATE SODIUM 100 MG
1 CAPSULE ORAL
Qty: 15 | Refills: 0
Start: 2019-07-16 | End: 2019-07-20

## 2019-07-16 RX ORDER — ASPIRIN/CALCIUM CARB/MAGNESIUM 324 MG
1 TABLET ORAL
Qty: 0 | Refills: 0 | DISCHARGE

## 2019-07-16 RX ORDER — TRAMADOL HYDROCHLORIDE 50 MG/1
1 TABLET ORAL
Qty: 0 | Refills: 0 | DISCHARGE
Start: 2019-07-16

## 2019-07-16 RX ORDER — ASPIRIN/CALCIUM CARB/MAGNESIUM 324 MG
1 TABLET ORAL
Qty: 84 | Refills: 0
Start: 2019-07-16 | End: 2019-08-26

## 2019-07-16 RX ORDER — GABAPENTIN 400 MG/1
1 CAPSULE ORAL
Qty: 0 | Refills: 0 | DISCHARGE

## 2019-07-16 RX ORDER — GABAPENTIN 400 MG/1
1 CAPSULE ORAL
Qty: 42 | Refills: 0
Start: 2019-07-16 | End: 2019-07-29

## 2019-07-16 RX ORDER — TRAMADOL HYDROCHLORIDE 50 MG/1
1 TABLET ORAL
Qty: 21 | Refills: 0
Start: 2019-07-16 | End: 2019-07-22

## 2019-07-16 RX ORDER — ASPIRIN/CALCIUM CARB/MAGNESIUM 324 MG
1 TABLET ORAL
Qty: 0 | Refills: 0 | DISCHARGE
Start: 2019-07-16

## 2019-07-16 RX ORDER — DOCUSATE SODIUM 100 MG
1 CAPSULE ORAL
Qty: 0 | Refills: 0 | DISCHARGE
Start: 2019-07-16

## 2019-07-16 RX ORDER — OXYCODONE HYDROCHLORIDE 5 MG/1
1 TABLET ORAL
Qty: 56 | Refills: 0
Start: 2019-07-16 | End: 2019-07-22

## 2019-07-16 RX ADMIN — Medication 100 MILLIGRAM(S): at 05:44

## 2019-07-16 RX ADMIN — Medication 100 MILLIGRAM(S): at 01:16

## 2019-07-16 RX ADMIN — Medication 75 MILLIGRAM(S): at 05:44

## 2019-07-16 RX ADMIN — SODIUM CHLORIDE 3 MILLILITER(S): 9 INJECTION INTRAMUSCULAR; INTRAVENOUS; SUBCUTANEOUS at 15:33

## 2019-07-16 RX ADMIN — Medication 100 MILLIGRAM(S): at 12:17

## 2019-07-16 RX ADMIN — Medication 2: at 07:47

## 2019-07-16 RX ADMIN — Medication 325 MILLIGRAM(S): at 05:44

## 2019-07-16 RX ADMIN — SODIUM CHLORIDE 75 MILLILITER(S): 9 INJECTION INTRAMUSCULAR; INTRAVENOUS; SUBCUTANEOUS at 05:43

## 2019-07-16 RX ADMIN — Medication 975 MILLIGRAM(S): at 15:31

## 2019-07-16 RX ADMIN — SODIUM CHLORIDE 3 MILLILITER(S): 9 INJECTION INTRAMUSCULAR; INTRAVENOUS; SUBCUTANEOUS at 05:14

## 2019-07-16 RX ADMIN — Medication 2: at 16:58

## 2019-07-16 RX ADMIN — Medication 100 MILLIGRAM(S): at 15:30

## 2019-07-16 RX ADMIN — SODIUM CHLORIDE 3 MILLILITER(S): 9 INJECTION INTRAMUSCULAR; INTRAVENOUS; SUBCUTANEOUS at 21:28

## 2019-07-16 RX ADMIN — ATORVASTATIN CALCIUM 10 MILLIGRAM(S): 80 TABLET, FILM COATED ORAL at 21:29

## 2019-07-16 RX ADMIN — TRAMADOL HYDROCHLORIDE 50 MILLIGRAM(S): 50 TABLET ORAL at 15:30

## 2019-07-16 RX ADMIN — LOSARTAN POTASSIUM 50 MILLIGRAM(S): 100 TABLET, FILM COATED ORAL at 05:44

## 2019-07-16 RX ADMIN — Medication 75 MILLIGRAM(S): at 17:05

## 2019-07-16 RX ADMIN — Medication 325 MILLIGRAM(S): at 17:04

## 2019-07-16 RX ADMIN — TRAMADOL HYDROCHLORIDE 50 MILLIGRAM(S): 50 TABLET ORAL at 21:29

## 2019-07-16 RX ADMIN — Medication 2: at 22:30

## 2019-07-16 RX ADMIN — PANTOPRAZOLE SODIUM 40 MILLIGRAM(S): 20 TABLET, DELAYED RELEASE ORAL at 06:59

## 2019-07-16 RX ADMIN — ATENOLOL 50 MILLIGRAM(S): 25 TABLET ORAL at 05:44

## 2019-07-16 RX ADMIN — POLYETHYLENE GLYCOL 3350 17 GRAM(S): 17 POWDER, FOR SOLUTION ORAL at 12:17

## 2019-07-16 RX ADMIN — Medication 2: at 12:17

## 2019-07-16 RX ADMIN — Medication 975 MILLIGRAM(S): at 21:29

## 2019-07-16 RX ADMIN — Medication 100 MILLIGRAM(S): at 21:29

## 2019-07-16 RX ADMIN — TRAMADOL HYDROCHLORIDE 50 MILLIGRAM(S): 50 TABLET ORAL at 05:44

## 2019-07-16 RX ADMIN — Medication 975 MILLIGRAM(S): at 05:43

## 2019-07-16 RX ADMIN — SODIUM CHLORIDE 1000 MILLILITER(S): 9 INJECTION, SOLUTION INTRAVENOUS at 05:43

## 2019-07-16 NOTE — CONSULT NOTE ADULT - PROBLEM SELECTOR RECOMMENDATION 4
MqV5o=1.0. FS mildly controlled. Continue with ISS for now and continue to monitor FS closely.  -holding home Januvia dose while in-house

## 2019-07-16 NOTE — DISCHARGE NOTE NURSING/CASE MANAGEMENT/SOCIAL WORK - NSDPDISTO_GEN_ALL_CORE
Pt. is afebrile and offers no complaints. In no acute distress. Left knee  dressing: clean, dry and intact. Pt is ambulating with a walker, tolerating diet well, and voiding in adequate amounts./Sub-Acute rehab

## 2019-07-16 NOTE — CONSULT NOTE ADULT - SUBJECTIVE AND OBJECTIVE BOX
CHIEF COMPLAINT: Patient is a 84y old  Male who presents with a chief complaint of DJD Left Knee (2019 07:10)    HPI: 84M h/o GERD, Gout, HLP, BPH, DM2 and HTN presents for a scheduled for left total knee replacement on 7/15/2019.  Pt states, "left knee pain for the past 3 yrs, xray shows bone on bone."  Was previously scheduled for 2019 cancelled due to pending cardiology evaluation. (2019 08:31)    Patient tolerated procedure well. Denies any F/C/N/V, CP or SOB. Hemov still with significant output.    Pain Symptoms if applicable:             	                         none	   mild         moderate         severe  	                            0	    1-3	     4-6	         7-10  Pain: 6  Location: right knee  Modifying factors: pain with walking	  Associated symptoms: pain to touch    Allergies: No Known Allergies    HOME MEDICATIONS: [x] Reviewed    MEDICATIONS  (STANDING):  acetaminophen   Tablet .. 975 milliGRAM(s) Oral every 8 hours  allopurinol 100 milliGRAM(s) Oral daily  aspirin enteric coated 325 milliGRAM(s) Oral two times a day  ATENolol  Tablet 50 milliGRAM(s) Oral daily  atorvastatin 10 milliGRAM(s) Oral at bedtime  dextrose 5%. 1000 milliLiter(s) (50 mL/Hr) IV Continuous <Continuous>  dextrose 50% Injectable 12.5 Gram(s) IV Push once  dextrose 50% Injectable 25 Gram(s) IV Push once  dextrose 50% Injectable 25 Gram(s) IV Push once  docusate sodium 100 milliGRAM(s) Oral three times a day  insulin lispro (HumaLOG) corrective regimen sliding scale   SubCutaneous three times a day before meals  insulin lispro (HumaLOG) corrective regimen sliding scale   SubCutaneous at bedtime  losartan 50 milliGRAM(s) Oral daily  pantoprazole    Tablet 40 milliGRAM(s) Oral before breakfast  polyethylene glycol 3350 17 Gram(s) Oral daily  pregabalin 75 milliGRAM(s) Oral two times a day  sodium chloride 0.9% lock flush 3 milliLiter(s) IV Push every 8 hours  sodium chloride 0.9%. 1000 milliLiter(s) (75 mL/Hr) IV Continuous <Continuous>  traMADol 50 milliGRAM(s) Oral every 8 hours    MEDICATIONS  (PRN):  aluminum hydroxide/magnesium hydroxide/simethicone Suspension 30 milliLiter(s) Oral four times a day PRN Indigestion  dextrose 40% Gel 15 Gram(s) Oral once PRN Blood Glucose LESS THAN 70 milliGRAM(s)/deciliter  glucagon  Injectable 1 milliGRAM(s) IntraMuscular once PRN Glucose LESS THAN 70 milligrams/deciliter  magnesium hydroxide Suspension 30 milliLiter(s) Oral daily PRN Constipation  ondansetron Injectable 4 milliGRAM(s) IV Push every 6 hours PRN Nausea and/or Vomiting  oxyCODONE    IR 5 milliGRAM(s) Oral every 4 hours PRN Moderate Pain (4 - 6)  oxyCODONE    IR 10 milliGRAM(s) Oral every 4 hours PRN Severe Pain (7 - 10)  senna 2 Tablet(s) Oral at bedtime PRN Constipation    PAST MEDICAL & SURGICAL HISTORY:  Shingles: 8 years ago  History of gastroesophageal reflux (GERD)  Gout  HLD (hyperlipidemia)  Prostate CA: Radiation seed Implant  BPH (benign prostatic hyperplasia)  DM2 (diabetes mellitus, type 2)  HTN (hypertension)  S/P cataract surgery: right eye  [x ] Reviewed     SOCIAL HISTORY:  · Marital Status	  3 children, 1 brother  heart problems	  · Occupation	retired- laundry storm	  · Lives With	children; son	    Substance Use History:  · Substance Use	caffeine  denies etoh and drug abuse	  · Caffeine Type	coffee; tea	  · Caffeine Amount/Frequency	1-2 cups/cans per day	    Alcohol Use History:  · Have you ever consumed alcohol	never	    Tobacco Usage:  · Tobacco Usage: Former smoker	  · Tobacco Type: cigarettes  quit 	  · Number of Packs per Day: 1	  · Number of yrs: 30	  · Pack yrs: 30	    FAMILY HISTORY:  FHx: lung cancer  [x] No pertinent family history in first degree relatives     REVIEW OF SYSTEMS:  [x] All other ROS negative  [  ] Unable to obtain due to poor mental status    Vital Signs Last 24 Hrs  T(C): 36.4 (2019 08:55), Max: 36.8 (15 Jul 2019 22:02)  T(F): 97.5 (2019 08:55), Max: 98.2 (15 Jul 2019 22:02)  HR: 69 (2019 08:55) (45 - 74)  BP: 124/71 (2019 08:55) (124/71 - 166/76)  BP(mean): 88 (15 Jul 2019 16:00) (84 - 101)  RR: 18 (2019 08:55) (14 - 22)  SpO2: 97% (2019 08:55) (96% - 100%)    PHYSICAL EXAM:  GENERAL: NAD, well-groomed, well-developed  HEAD:  Atraumatic, Normocephalic  EYES: EOMI, PERRLA, conjunctiva and sclera clear  ENMT: Moist mucous membranes  NECK: Supple, No JVD  RESPIRATORY: Clear to auscultation bilaterally; No rales, rhonchi, wheezing, or rubs  CARDIOVASCULAR: Regular rate and rhythm; No murmurs, rubs, or gallops  GASTROINTESTINAL: Soft, Nontender, Nondistended; Bowel sounds present  GENITOURINARY: Not examined  EXTREMITIES:  2+ Peripheral Pulses, No clubbing, cyanosis, or edema  NERVOUS SYSTEM:  Alert & Oriented X3; Moving all 4 extremities; No gross sensory deficits  HEME/LYMPH: No lymphadenopathy noted  SKIN: No rashes or lesions; Incisions C/D/I; +HV    LABS:                        12.3   17.54 )-----------( 149      ( 2019 06:19 )             36.7     Hemoglobin: 12.3 g/dL ( @ 06:19)  Hemoglobin: 13.2 g/dL (07-15 @ 12:37)    -    142  |  106  |  22  ----------------------------<  190<H>  4.5   |  22  |  1.17    Ca    9.0      2019 06:19          Microbiology     RADIOLOGY & ADDITIONAL STUDIES:    EKG:   Personally Reviewed:  [x] YES     Imaging:   Personally Reviewed:  [x] YES               [ ] Consultant(s) Notes Reviewed  [x] Care Discussed with Consultants/Other Providers: Ortho PA - discussed CHIEF COMPLAINT: Patient is a 84y old  Male who presents with a chief complaint of DJD Left Knee (2019 07:10)    HPI: 84M h/o GERD, Gout, HLP, BPH, DM2 and HTN presents for a scheduled for left total knee replacement on 7/15/2019.  Pt states, "left knee pain for the past 3 yrs, xray shows bone on bone."  Was previously scheduled for 2019 cancelled due to pending cardiology evaluation. (2019 08:31)    Patient tolerated procedure well. Denies any F/C/N/V, CP or SOB. Hemov still with significant output.    Pain Symptoms if applicable:             	                         none	   mild         moderate         severe  	                            0	    1-3	     4-6	         7-10  Pain: 4  Location: right knee  Modifying factors: pain with walking	  Associated symptoms: pain to touch    Allergies: No Known Allergies    HOME MEDICATIONS: [x] Reviewed    MEDICATIONS  (STANDING):  acetaminophen   Tablet .. 975 milliGRAM(s) Oral every 8 hours  allopurinol 100 milliGRAM(s) Oral daily  aspirin enteric coated 325 milliGRAM(s) Oral two times a day  ATENolol  Tablet 50 milliGRAM(s) Oral daily  atorvastatin 10 milliGRAM(s) Oral at bedtime  dextrose 5%. 1000 milliLiter(s) (50 mL/Hr) IV Continuous <Continuous>  dextrose 50% Injectable 12.5 Gram(s) IV Push once  dextrose 50% Injectable 25 Gram(s) IV Push once  dextrose 50% Injectable 25 Gram(s) IV Push once  docusate sodium 100 milliGRAM(s) Oral three times a day  insulin lispro (HumaLOG) corrective regimen sliding scale   SubCutaneous three times a day before meals  insulin lispro (HumaLOG) corrective regimen sliding scale   SubCutaneous at bedtime  losartan 50 milliGRAM(s) Oral daily  pantoprazole    Tablet 40 milliGRAM(s) Oral before breakfast  polyethylene glycol 3350 17 Gram(s) Oral daily  pregabalin 75 milliGRAM(s) Oral two times a day  sodium chloride 0.9% lock flush 3 milliLiter(s) IV Push every 8 hours  sodium chloride 0.9%. 1000 milliLiter(s) (75 mL/Hr) IV Continuous <Continuous>  traMADol 50 milliGRAM(s) Oral every 8 hours    MEDICATIONS  (PRN):  aluminum hydroxide/magnesium hydroxide/simethicone Suspension 30 milliLiter(s) Oral four times a day PRN Indigestion  dextrose 40% Gel 15 Gram(s) Oral once PRN Blood Glucose LESS THAN 70 milliGRAM(s)/deciliter  glucagon  Injectable 1 milliGRAM(s) IntraMuscular once PRN Glucose LESS THAN 70 milligrams/deciliter  magnesium hydroxide Suspension 30 milliLiter(s) Oral daily PRN Constipation  ondansetron Injectable 4 milliGRAM(s) IV Push every 6 hours PRN Nausea and/or Vomiting  oxyCODONE    IR 5 milliGRAM(s) Oral every 4 hours PRN Moderate Pain (4 - 6)  oxyCODONE    IR 10 milliGRAM(s) Oral every 4 hours PRN Severe Pain (7 - 10)  senna 2 Tablet(s) Oral at bedtime PRN Constipation    PAST MEDICAL & SURGICAL HISTORY:  Shingles: 8 years ago  History of gastroesophageal reflux (GERD)  Gout  HLD (hyperlipidemia)  Prostate CA: Radiation seed Implant  BPH (benign prostatic hyperplasia)  DM2 (diabetes mellitus, type 2)  HTN (hypertension)  S/P cataract surgery: right eye  [x ] Reviewed     SOCIAL HISTORY:  · Marital Status	  3 children, 1 brother  heart problems	  · Occupation	retired- laundry storm	  · Lives With	children; son	    Substance Use History:  · Substance Use	caffeine  denies etoh and drug abuse	  · Caffeine Type	coffee; tea	  · Caffeine Amount/Frequency	1-2 cups/cans per day	    Alcohol Use History:  · Have you ever consumed alcohol	never	    Tobacco Usage:  · Tobacco Usage: Former smoker	  · Tobacco Type: cigarettes  quit 	  · Number of Packs per Day: 1	  · Number of yrs: 30	  · Pack yrs: 30	    FAMILY HISTORY:  FHx: lung cancer  [x] No pertinent family history in first degree relatives     REVIEW OF SYSTEMS:  [x] All other ROS negative  [  ] Unable to obtain due to poor mental status    Vital Signs Last 24 Hrs  T(C): 36.4 (2019 08:55), Max: 36.8 (15 Jul 2019 22:02)  T(F): 97.5 (2019 08:55), Max: 98.2 (15 Jul 2019 22:02)  HR: 69 (2019 08:55) (45 - 74)  BP: 124/71 (2019 08:55) (124/71 - 166/76)  BP(mean): 88 (15 Jul 2019 16:00) (84 - 101)  RR: 18 (2019 08:55) (14 - 22)  SpO2: 97% (2019 08:55) (96% - 100%)    PHYSICAL EXAM:  GENERAL: NAD, well-groomed, well-developed  HEAD:  Atraumatic, Normocephalic  EYES: EOMI, PERRLA, conjunctiva and sclera clear  ENMT: Moist mucous membranes  NECK: Supple, No JVD  RESPIRATORY: Clear to auscultation bilaterally; No rales, rhonchi, wheezing, or rubs  CARDIOVASCULAR: Regular rate and rhythm; No murmurs, rubs, or gallops  GASTROINTESTINAL: Soft, Nontender, Nondistended; Bowel sounds present  GENITOURINARY: Not examined  EXTREMITIES:  2+ Peripheral Pulses, No clubbing, cyanosis, or edema  NERVOUS SYSTEM:  Alert & Oriented X3; Moving all 4 extremities; No gross sensory deficits  HEME/LYMPH: No lymphadenopathy noted  SKIN: No rashes or lesions; Incisions C/D/I; +HV    LABS:                        12.3   17.54 )-----------( 149      ( 2019 06:19 )             36.7     Hemoglobin: 12.3 g/dL ( @ 06:19)  Hemoglobin: 13.2 g/dL (07-15 @ 12:37)    -    142  |  106  |  22  ----------------------------<  190<H>  4.5   |  22  |  1.17    Ca    9.0      2019 06:19          Microbiology     RADIOLOGY & ADDITIONAL STUDIES:    EKG:   Personally Reviewed:  [x] YES     Imaging:   Personally Reviewed:  [x] YES               [ ] Consultant(s) Notes Reviewed  [x] Care Discussed with Consultants/Other Providers: Ortho PA - discussed

## 2019-07-16 NOTE — OCCUPATIONAL THERAPY INITIAL EVALUATION ADULT - PLANNED THERAPY INTERVENTIONS, OT EVAL
transfer training/balance training/neuromuscular re-education/strengthening/ADL retraining/bed mobility training

## 2019-07-16 NOTE — DISCHARGE NOTE PROVIDER - HOSPITAL COURSE
85 yo male with a history of OA, admitted to the orthopedic service for an elective left total knee replacement.  Pt medically optimized and underwent surgery on 7/15 by Dr Wiggins.  Pt tolerated procedure well without any mery-op complications.  Post op, pt made weight bear as tolerated and put on  mg twice a day for blood clot prevention for 6 weeks.  Pt doing well in PT/OT, pain controlled, labs and vitals stable, incision clean and dry, as per Dr Wiggins, pt stable for discharge.  Pt is to keep aquacel dressing on until POD #14, and then remove dressing and sutures.  Call Dr Tamez office to make an appt within 2 weeks at [phone] and PMD within 2 weeks for continuity of care as there may have been changes in patients medications during hospital stay. Please avoid any nephrotoxic medications while in rehab as patient has CKD. Notify ortho with any questions or concerns.

## 2019-07-16 NOTE — DISCHARGE NOTE PROVIDER - NSDCCPCAREPLAN_GEN_ALL_CORE_FT
PRINCIPAL DISCHARGE DIAGNOSIS  Diagnosis: Left knee DJD  Assessment and Plan of Treatment: 83 yo male with a history of OA, admitted to the orthopedic service for an elective left total knee replacement.  Pt medically optimized and underwent surgery on 7/15 by Dr Wiggins.  Pt tolerated procedure well without any mery-op complications.  Post op, pt made weight bear as tolerated and put on  mg twice a day for blood clot prevention for 6 weeks.  Pt doing well in PT/OT, pain controlled, labs and vitals stable, incision clean and dry, as per Dr Wiggins, pt stable for discharge.  Pt is to keep aquacel dressing on until POD #14, and then remove dressing and sutures.  Call Dr Tamez office to make an appt within 2 weeks at [phone] and PMD within 2 weeks for continuity of care as there may have been changes in patients medications during hospital stay. Please avoid any nephrotoxic medications while in rehab as patient has CKD. Notify ortho with any questions or concerns.

## 2019-07-16 NOTE — PROGRESS NOTE ADULT - SUBJECTIVE AND OBJECTIVE BOX
Ortho PA Progress Note     Patient resting comfortably, pain controlled, no acute events.  Patient denies any chest pain, shortness of breath, nausea, vomiting, diarrhea, headache or dizziness.  Patient currently has no complaints.    Vital Signs: Vital Signs Last 24 Hrs  Vital Signs Last 24 Hrs  T(C): 36.6 (16 Jul 2019 05:41), Max: 36.8 (15 Jul 2019 22:02)  T(F): 97.8 (16 Jul 2019 05:41), Max: 98.2 (15 Jul 2019 22:02)  HR: 74 (16 Jul 2019 05:41) (45 - 74)  BP: 166/76 (16 Jul 2019 05:41) (131/65 - 166/76)  BP(mean): 88 (15 Jul 2019 16:00) (84 - 101)  RR: 18 (16 Jul 2019 05:41) (12 - 22)  SpO2: 97% (16 Jul 2019 05:41) (96% - 100%    Gen: Alert, NAD  LLE: HV unsewn inplace to gravity, dressing clean, dry and intact, no erythema.  Extremity warm, brisk capillary refill, compartments soft, +1 DP pulse, no calf tenderness.  Intact EHL/FHL/GS/TA, SILT S/S/DP/SP/T    Labs:                       13.2   7.46  )-----------( 153      ( 15 Jul 2019 12:37 )             39.2   07-15    144  |  106  |  22  ----------------------------<  139<H>  4.5   |  27  |  1.29    Ca    9.3      15 Jul 2019 12:37      A/P: 84y Male s/p left TKA POD #1  1. Pain management  2. WBAT   3. PT/OT  4. ASA BID for DVT ppx   5. Venodynes  6. Incentive spirometry  7. F/u HV output  8. D/c planning

## 2019-07-16 NOTE — DISCHARGE NOTE PROVIDER - CARE PROVIDER_API CALL
Tony Wiggins)  Orthopaedic Surgery  [address], [address]  [place] [address]  Phone: [phone]  Fax: [phone]  Follow Up Time:

## 2019-07-16 NOTE — PROGRESS NOTE ADULT - SUBJECTIVE AND OBJECTIVE BOX
ANESTHESIA POSTOP CHECK    84y Male POSTOP DAY 1     Vital Signs Last 24 Hrs  T(C): 36.4 (16 Jul 2019 08:55), Max: 36.8 (15 Jul 2019 22:02)  T(F): 97.5 (16 Jul 2019 08:55), Max: 98.2 (15 Jul 2019 22:02)  HR: 69 (16 Jul 2019 08:55) (45 - 74)  BP: 124/71 (16 Jul 2019 08:55) (124/71 - 166/76)  BP(mean): 88 (15 Jul 2019 16:00) (84 - 101)  RR: 18 (16 Jul 2019 08:55) (12 - 22)  SpO2: 97% (16 Jul 2019 08:55) (96% - 100%)  I&O's Summary    15 Jul 2019 07:01  -  16 Jul 2019 07:00  --------------------------------------------------------  IN: 900 mL / OUT: 2390 mL / NET: -1490 mL    16 Jul 2019 07:01  -  16 Jul 2019 09:29  --------------------------------------------------------  IN: 0 mL / OUT: 150 mL / NET: -150 mL        NO APPARENT ANESTHESIA COMPLICATIONS

## 2019-07-16 NOTE — OCCUPATIONAL THERAPY INITIAL EVALUATION ADULT - PERTINENT HX OF CURRENT PROBLEM, REHAB EVAL
85y/o male scheduled for left total knee replacement on 7/15/2019.  Pt states, "left knee pain for the past 3 yrs, xray shows bone on bone."

## 2019-07-16 NOTE — CONSULT NOTE ADULT - PROBLEM SELECTOR RECOMMENDATION 9
Pain well controlled; continue management and pain control per ortho recs with oxycodone IR prn, tramadol and lyrica

## 2019-07-16 NOTE — DISCHARGE NOTE NURSING/CASE MANAGEMENT/SOCIAL WORK - NSDCPNINST_GEN_ALL_CORE
You have a postop appointment with Dr Wiggins on 7/26/2019 @ 10:45 am [address],  please keep postop dressing on until this appointmet.  Notify Dr wiggins if you experience any increase in pain not relieved with pain medication.  Drink plenty of fluids.  Continue to do exercises as instructed.  Use over the counter stool softeners to assist with constipation which can be aside effect of your pain medication. You have a postop appointment with Dr Wiggins on 7/26/2019 @ 10:45 am [address],  please keep postop dressing on until this appointment.  Notify Dr Wiggins if you experience any increase in pain not relieved with pain medication.  Drink plenty of fluids.  Continue to do exercises as instructed.  Use over the counter stool softeners to assist with constipation which can be a side effect of pain medication.  Continue to follow consistent carb diet, and follow up with PMD for continued management of your diabetes.          ch can be aside effect of your pain medication. You have a postop appointment with Dr Wiggins on 7/26/2019 @ 10:45 am [address],  please keep postop dressing on until this appointment.  Notify Dr Wiggins if you experience any increase in pain not relieved with pain medication.  Drink plenty of fluids.  Continue to do exercises as instructed.  Use over the counter stool softeners to assist with constipation which can be a side effect of pain medication.  Continue to follow consistent carb diet, and follow up with PMD for continued management of your diabetes.

## 2019-07-16 NOTE — DISCHARGE NOTE NURSING/CASE MANAGEMENT/SOCIAL WORK - NSDCPECAREGIVERED_GEN_ALL_CORE
[place] on total knee, d/c medications, managing pain at home, side effects pamphlet, force pamphlet, diabetes education with current A1C of 7.0 [place] on total knee, d/c medications, managing pain at home, side effects pamphlet, force pamphlet, diabetes education with current A1C of 7.0/No

## 2019-07-16 NOTE — DISCHARGE NOTE NURSING/CASE MANAGEMENT/SOCIAL WORK - NSDCDPATPORTLINK_GEN_ALL_CORE
You can access the [facility] Patient Portal, offered by [facility], by registering with the following website: [email or web address]

## 2019-07-17 VITALS
RESPIRATION RATE: 18 BRPM | SYSTOLIC BLOOD PRESSURE: 123 MMHG | OXYGEN SATURATION: 96 % | TEMPERATURE: 98 F | DIASTOLIC BLOOD PRESSURE: 58 MMHG | HEART RATE: 57 BPM

## 2019-07-17 LAB
ANION GAP SERPL CALC-SCNC: 10 MMO/L — SIGNIFICANT CHANGE UP (ref 7–14)
BUN SERPL-MCNC: 30 MG/DL — HIGH (ref 7–23)
CALCIUM SERPL-MCNC: 8.7 MG/DL — SIGNIFICANT CHANGE UP (ref 8.4–10.5)
CHLORIDE SERPL-SCNC: 106 MMOL/L — SIGNIFICANT CHANGE UP (ref 98–107)
CO2 SERPL-SCNC: 25 MMOL/L — SIGNIFICANT CHANGE UP (ref 22–31)
CREAT SERPL-MCNC: 1.09 MG/DL — SIGNIFICANT CHANGE UP (ref 0.5–1.3)
GLUCOSE BLDC GLUCOMTR-MCNC: 129 MG/DL — HIGH (ref 70–99)
GLUCOSE BLDC GLUCOMTR-MCNC: 176 MG/DL — HIGH (ref 70–99)
GLUCOSE BLDC GLUCOMTR-MCNC: 177 MG/DL — HIGH (ref 70–99)
GLUCOSE BLDC GLUCOMTR-MCNC: 192 MG/DL — HIGH (ref 70–99)
GLUCOSE SERPL-MCNC: 190 MG/DL — HIGH (ref 70–99)
HCT VFR BLD CALC: 30.6 % — LOW (ref 39–50)
HGB BLD-MCNC: 10.3 G/DL — LOW (ref 13–17)
MCHC RBC-ENTMCNC: 33.1 PG — SIGNIFICANT CHANGE UP (ref 27–34)
MCHC RBC-ENTMCNC: 33.7 % — SIGNIFICANT CHANGE UP (ref 32–36)
MCV RBC AUTO: 98.4 FL — SIGNIFICANT CHANGE UP (ref 80–100)
NRBC # FLD: 0 K/UL — SIGNIFICANT CHANGE UP (ref 0–0)
PLATELET # BLD AUTO: 125 K/UL — LOW (ref 150–400)
PMV BLD: 12.4 FL — SIGNIFICANT CHANGE UP (ref 7–13)
POTASSIUM SERPL-MCNC: 4.1 MMOL/L — SIGNIFICANT CHANGE UP (ref 3.5–5.3)
POTASSIUM SERPL-SCNC: 4.1 MMOL/L — SIGNIFICANT CHANGE UP (ref 3.5–5.3)
RBC # BLD: 3.11 M/UL — LOW (ref 4.2–5.8)
RBC # FLD: 13.4 % — SIGNIFICANT CHANGE UP (ref 10.3–14.5)
SODIUM SERPL-SCNC: 141 MMOL/L — SIGNIFICANT CHANGE UP (ref 135–145)
WBC # BLD: 12.14 K/UL — HIGH (ref 3.8–10.5)
WBC # FLD AUTO: 12.14 K/UL — HIGH (ref 3.8–10.5)

## 2019-07-17 PROCEDURE — 99238 HOSP IP/OBS DSCHRG MGMT 30/<: CPT

## 2019-07-17 PROCEDURE — 99232 SBSQ HOSP IP/OBS MODERATE 35: CPT

## 2019-07-17 RX ADMIN — TRAMADOL HYDROCHLORIDE 50 MILLIGRAM(S): 50 TABLET ORAL at 14:09

## 2019-07-17 RX ADMIN — Medication 975 MILLIGRAM(S): at 14:06

## 2019-07-17 RX ADMIN — Medication 100 MILLIGRAM(S): at 14:05

## 2019-07-17 RX ADMIN — TRAMADOL HYDROCHLORIDE 50 MILLIGRAM(S): 50 TABLET ORAL at 07:24

## 2019-07-17 RX ADMIN — POLYETHYLENE GLYCOL 3350 17 GRAM(S): 17 POWDER, FOR SOLUTION ORAL at 14:05

## 2019-07-17 RX ADMIN — LOSARTAN POTASSIUM 50 MILLIGRAM(S): 100 TABLET, FILM COATED ORAL at 07:24

## 2019-07-17 RX ADMIN — Medication 975 MILLIGRAM(S): at 07:24

## 2019-07-17 RX ADMIN — PANTOPRAZOLE SODIUM 40 MILLIGRAM(S): 20 TABLET, DELAYED RELEASE ORAL at 08:23

## 2019-07-17 RX ADMIN — Medication 2: at 12:13

## 2019-07-17 RX ADMIN — Medication 2: at 08:50

## 2019-07-17 RX ADMIN — Medication 100 MILLIGRAM(S): at 07:24

## 2019-07-17 RX ADMIN — Medication 75 MILLIGRAM(S): at 07:24

## 2019-07-17 RX ADMIN — SODIUM CHLORIDE 3 MILLILITER(S): 9 INJECTION INTRAMUSCULAR; INTRAVENOUS; SUBCUTANEOUS at 14:04

## 2019-07-17 RX ADMIN — SODIUM CHLORIDE 3 MILLILITER(S): 9 INJECTION INTRAMUSCULAR; INTRAVENOUS; SUBCUTANEOUS at 06:23

## 2019-07-17 RX ADMIN — Medication 325 MILLIGRAM(S): at 07:24

## 2019-07-17 NOTE — PROGRESS NOTE ADULT - PROBLEM SELECTOR PLAN 1
Pain well controlled; continue management and pain control per ortho recs with oxycodone IR prn, tramadol and lyrica.

## 2019-07-17 NOTE — PROGRESS NOTE ADULT - SUBJECTIVE AND OBJECTIVE BOX
CHIEF COMPLAINT: f/u right knee replacement    SUBJECTIVE / OVERNIGHT EVENTS: Patient seen and examined. No acute events overnight. Pain well controlled and patient without any complaints. Patient's son at bedside.    MEDICATIONS  (STANDING):  acetaminophen   Tablet .. 975 milliGRAM(s) Oral every 8 hours  allopurinol 100 milliGRAM(s) Oral daily  aspirin enteric coated 325 milliGRAM(s) Oral two times a day  ATENolol  Tablet 50 milliGRAM(s) Oral daily  atorvastatin 10 milliGRAM(s) Oral at bedtime  dextrose 5%. 1000 milliLiter(s) (50 mL/Hr) IV Continuous <Continuous>  dextrose 50% Injectable 12.5 Gram(s) IV Push once  dextrose 50% Injectable 25 Gram(s) IV Push once  dextrose 50% Injectable 25 Gram(s) IV Push once  docusate sodium 100 milliGRAM(s) Oral three times a day  insulin lispro (HumaLOG) corrective regimen sliding scale   SubCutaneous three times a day before meals  insulin lispro (HumaLOG) corrective regimen sliding scale   SubCutaneous at bedtime  losartan 50 milliGRAM(s) Oral daily  pantoprazole    Tablet 40 milliGRAM(s) Oral before breakfast  polyethylene glycol 3350 17 Gram(s) Oral daily  pregabalin 75 milliGRAM(s) Oral two times a day  sodium chloride 0.9% lock flush 3 milliLiter(s) IV Push every 8 hours  sodium chloride 0.9%. 1000 milliLiter(s) (75 mL/Hr) IV Continuous <Continuous>  traMADol 50 milliGRAM(s) Oral every 8 hours    MEDICATIONS  (PRN):  aluminum hydroxide/magnesium hydroxide/simethicone Suspension 30 milliLiter(s) Oral four times a day PRN Indigestion  bisacodyl Suppository 10 milliGRAM(s) Rectal daily PRN If no bowel movement by postoperative day #2  dextrose 40% Gel 15 Gram(s) Oral once PRN Blood Glucose LESS THAN 70 milliGRAM(s)/deciliter  glucagon  Injectable 1 milliGRAM(s) IntraMuscular once PRN Glucose LESS THAN 70 milligrams/deciliter  magnesium hydroxide Suspension 30 milliLiter(s) Oral daily PRN Constipation  ondansetron Injectable 4 milliGRAM(s) IV Push every 6 hours PRN Nausea and/or Vomiting  oxyCODONE    IR 5 milliGRAM(s) Oral every 4 hours PRN Moderate Pain (4 - 6)  oxyCODONE    IR 10 milliGRAM(s) Oral every 4 hours PRN Severe Pain (7 - 10)  senna 2 Tablet(s) Oral at bedtime PRN Constipation    VITALS:  T(F): 97.9 (07-17-19 @ 10:19), Max: 97.9 (07-17-19 @ 01:20)  HR: 60 (07-17-19 @ 10:19) (60 - 65)  BP: 142/62 (07-17-19 @ 10:19) (140/66 - 168/73)  RR: 18 (07-17-19 @ 10:19) (16 - 18)  SpO2: 99% (07-17-19 @ 10:19)    PHYSICAL EXAM:  GENERAL: NAD, well-groomed, well-developed  HEAD:  Atraumatic, Normocephalic  EYES: EOMI, PERRLA, conjunctiva and sclera clear  ENMT: Moist mucous membranes  NECK: Supple, No JVD  RESPIRATORY: Clear to auscultation bilaterally; No rales, rhonchi, wheezing, or rubs  CARDIOVASCULAR: Regular rate and rhythm; No murmurs, rubs, or gallops  GASTROINTESTINAL: Soft, Nontender, Nondistended; Bowel sounds present  GENITOURINARY: Not examined  EXTREMITIES:  2+ Peripheral Pulses, No clubbing, cyanosis, or edema  NERVOUS SYSTEM:  Alert & Oriented X3; Moving all 4 extremities; No gross sensory deficits  HEME/LYMPH: No lymphadenopathy noted  SKIN: No rashes or lesions; Incisions C/D/I; +HV    LABS:              10.3                 141  | 25   | 30           12.14 >-----------< 125     ------------------------< 190                   30.6                 4.1  | 106  | 1.09                                         Ca 8.7   Mg x     Ph x                      RADIOLOGY & ADDITIONAL TESTS:  Imaging Personally Reviewed: [x] Yes    [ ] Consultant(s) Notes Reviewed:  [x] Care Discussed with Consultants/Other Providers: Orthopedic PA - discussed

## 2019-07-17 NOTE — PROGRESS NOTE ADULT - SUBJECTIVE AND OBJECTIVE BOX
Orthopaedic Surgery Progress Note    Subjective:   Patient seen and examined  No acute events overnight  Pain well controlled  Denies cp/sob/f/c  Walked 50 ft with PT    Objective:  T(C): 36.6 (07-17-19 @ 01:20), Max: 36.6 (07-17-19 @ 01:20)  HR: 62 (07-17-19 @ 01:20) (62 - 72)  BP: 140/66 (07-17-19 @ 01:20) (124/71 - 162/67)  RR: 17 (07-17-19 @ 01:20) (17 - 18)  SpO2: 99% (07-17-19 @ 01:20) (97% - 100%)  Wt(kg): --    07-15 @ 07:01  -  07-16 @ 07:00  --------------------------------------------------------  IN: 900 mL / OUT: 2390 mL / NET: -1490 mL    07-16 @ 07:01  -  07-17 @ 06:19  --------------------------------------------------------  IN: 0 mL / OUT: 2685 mL / NET: -2685 mL        PE    NAD  LLE:   dressing C/D/I, HV intact with ss output, 45/340  motor intact GS/TA/EHL  SILT S/S/SP/DP  WWP                          12.3   17.54 )-----------( 149      ( 16 Jul 2019 06:19 )             36.7     07-16    142  |  106  |  22  ----------------------------<  190<H>  4.5   |  22  |  1.17    Ca    9.0      16 Jul 2019 06:19            84y Male s/p L TKA  - Pain control  - DC HV prior to DC  - WBAT  - PT/OT/OOB  - DVT ppx  - Dispo planning

## 2019-07-17 NOTE — PROGRESS NOTE ADULT - PROBLEM SELECTOR PLAN 4
DjW2f=5.0. FS mildly controlled. Continue with ISS for now and continue to monitor FS closely.  -holding home Januvia dose while in-house.

## 2019-07-19 DIAGNOSIS — Z96.652 PRESENCE OF LEFT ARTIFICIAL KNEE JOINT: ICD-10-CM

## 2019-07-25 ENCOUNTER — CLINICAL ADVICE (OUTPATIENT)
Age: 84
End: 2019-07-25

## 2019-07-25 LAB — SURGICAL PATHOLOGY STUDY: SIGNIFICANT CHANGE UP

## 2019-07-26 ENCOUNTER — APPOINTMENT (OUTPATIENT)
Dept: ORTHOPEDIC SURGERY | Facility: CLINIC | Age: 84
End: 2019-07-26

## 2019-07-29 ENCOUNTER — INBOUND DOCUMENT (OUTPATIENT)
Age: 84
End: 2019-07-29

## 2019-08-09 ENCOUNTER — APPOINTMENT (OUTPATIENT)
Dept: ORTHOPEDIC SURGERY | Facility: CLINIC | Age: 84
End: 2019-08-09
Payer: MEDICARE

## 2019-08-09 VITALS — DIASTOLIC BLOOD PRESSURE: 72 MMHG | SYSTOLIC BLOOD PRESSURE: 168 MMHG | HEART RATE: 52 BPM | HEIGHT: 69 IN

## 2019-08-09 PROCEDURE — 73562 X-RAY EXAM OF KNEE 3: CPT | Mod: LT

## 2019-08-09 PROCEDURE — 99024 POSTOP FOLLOW-UP VISIT: CPT

## 2019-08-26 NOTE — CONSULT LETTER
[Dear  ___] : Dear  [unfilled], [Sincerely,] : Sincerely, [I had the pleasure of evaluating your patient, [unfilled].] : I had the pleasure of evaluating your patient, [unfilled]. [Please see my note below.] : Please see my note below. [FreeTextEntry2] : HANG RACHEL  [FreeTextEntry3] : Tony Wiggins MD\par \par ______________________________________________\par [facility]: Hip/Knee Arthroplasty\par [address] [facility], [facility] [address], [place] [address]\par (t) [phone]\par (f) [phone]

## 2019-08-26 NOTE — HISTORY OF PRESENT ILLNESS
[Clean/Dry/Intact] : clean, dry and intact [Healed] : healed [Neuro Intact] : an unremarkable neurological exam [Vascular Intact] : ~T peripheral vascular exam normal [Negative Christian's] : maneuvers demonstrated a negative Christian's sign [Doing Well] : is doing well [No Sign of Infection] : is showing no signs of infection [Adequate Pain Control] : has adequate pain control [Chills] : no chills [Fever] : no fever [Erythema] : not erythematous [Discharge] : absent of discharge [Swelling] : not swollen [Dehiscence] : not dehisced [de-identified] : status post left TKR 7/15/19 [de-identified] : Left Knee: Walks with left stiff knee gait. The mepilex was already removed, the steri strips were removed today in office. There is a well-healed scar surgery with no significant swelling, redness or tenderness. There is a valgus alignment of 5°, no effusion, active straight leg raise of 60° and knee range of motion of 0-90° with good stability alignment and quadriceps grade 4 power.  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 He is currently at [facility], and is participating in physical therapy, as well as a home exercise program daily. He  is taking tramadol, gabapentin, and Tylenol for pain and pain level is controlled. He  is taking aspirin for DVT ppx.  [de-identified] : AP, Lateral, and Sunrise Radiographs of the Left knee taken today reveal a well fixed and aligned Left total knee replacement with no evidence of mechanical failure or periprosthetic fracture.  [de-identified] : The patient was informed of the findings and recommended conservative management in the form of a home exercise program, activity modifications, stationary bicycling, and ambulation with a cane.  A prescription for a course of physical therapy was provided. Patient did not require prescription refills at this time.  A long discussion was had with the patient advising them to wean from use of the narcotic medication as tolerated. He will take aspirin for DVT prophylaxis for another four weeks.  The risks, benefits, and side effects were discussed.  Follow up appointment was recommended in 6 weeks.

## 2019-09-20 ENCOUNTER — APPOINTMENT (OUTPATIENT)
Dept: ORTHOPEDIC SURGERY | Facility: CLINIC | Age: 84
End: 2019-09-20
Payer: MEDICARE

## 2019-09-20 DIAGNOSIS — Z96.652 PRESENCE OF LEFT ARTIFICIAL KNEE JOINT: ICD-10-CM

## 2019-09-20 PROCEDURE — 99024 POSTOP FOLLOW-UP VISIT: CPT

## 2019-09-20 RX ORDER — DICLOFENAC SODIUM 10 MG/G
1 GEL TOPICAL DAILY
Qty: 1 | Refills: 2 | Status: ACTIVE | COMMUNITY
Start: 2019-09-20

## 2019-09-20 NOTE — HISTORY OF PRESENT ILLNESS
[Clean/Dry/Intact] : clean, dry and intact [Healed] : healed [Neuro Intact] : an unremarkable neurological exam [Vascular Intact] : ~T peripheral vascular exam normal [Negative Christian's] : maneuvers demonstrated a negative Christian's sign [Doing Well] : is doing well [No Sign of Infection] : is showing no signs of infection [Adequate Pain Control] : has adequate pain control [Fever] : no fever [Chills] : no chills [Erythema] : not erythematous [Dehiscence] : not dehisced [Discharge] : absent of discharge [Swelling] : not swollen [de-identified] : status post left TKR 7/15/19 [de-identified] : Left Knee: Walks with left stiff knee gait.  There is a well-healed scar surgery with no significant swelling, redness or tenderness. There is a valgus alignment of 5°, no effusion, active straight leg raise of 60° and knee range of motion of 0-105° with good stability alignment and quadriceps grade 4 power.  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 He has been doing physical therapy and home exercises. He is only having pain at night. He notes he does not tolerate NSAIDs, and would like a prescription for topical pain cream.  [de-identified] : AP, Lateral, and Sunrise Radiographs of the Left knee taken last visit reveal a well fixed and aligned Left total knee replacement with no evidence of mechanical failure or periprosthetic fracture.  [de-identified] : The patient was informed of the findings and recommended conservative management in the form of a home exercise program, activity modifications, stationary bicycling, and ambulation with a cane.  A prescription for a course of physical therapy was provided for him to continue. He was provided with a prescription for voltaren gel for inflammation as needed. He has been advised to consult with his PCP for sleeping medications.He has completed DVT ppx.  The risks, benefits, and side effects were discussed.  Follow up appointment was recommended annually.

## 2019-09-20 NOTE — CONSULT LETTER
[Dear  ___] : Dear  [unfilled], [I had the pleasure of evaluating your patient, [unfilled].] : I had the pleasure of evaluating your patient, [unfilled]. [Please see my note below.] : Please see my note below. [Sincerely,] : Sincerely, [FreeTextEntry2] : HANG RACHEL  [FreeTextEntry3] : Tony Wiggins MD\par \par ______________________________________________\par [facility]: Hip/Knee Arthroplasty\par [address], [facility] [address], [place] [address]\par (t) [phone]\par (f) [phone]

## 2019-10-13 ENCOUNTER — FORM ENCOUNTER (OUTPATIENT)
Age: 84
End: 2019-10-13

## 2019-11-18 ENCOUNTER — FORM ENCOUNTER (OUTPATIENT)
Age: 84
End: 2019-11-18

## 2020-08-26 NOTE — ED PROVIDER NOTE - DISCUSSED CLINICAL AND RADIOLOGICAL FINDINGS WITH, MDM
Health Maintenance Due   Topic Date Due    Shingles Vaccine (2 of 3) 01/13/2015    Colorectal Cancer Screening  07/20/2019    Aspirin/Antiplatelet Therapy  02/22/2020     Updates were requested from care everywhere.  Chart was reviewed for overdue Proactive [facility] Encounters (ЕЛЕНА) topics (CRS, Breast Cancer Screening, Eye exam)  Health Maintenance has been updated.  LINKS immunization registry triggered.  Immunizations were reconciled.       patient

## 2023-08-31 NOTE — OCCUPATIONAL THERAPY INITIAL EVALUATION ADULT - IMPAIRMENTS CONTRIBUTING IMPAIRED BED MOBILITY, REHAB EVAL
[facility]        Inpatient Hematology / Oncology Progress Note    Reason for Admission:  Tachycardia [R00.0]  Metastatic carcinoma ([address]) [C79.9]  Coffee ground emesis [K92.0]  Hematemesis with nausea [K92.0]  Leukocytosis, unspecified type [D72.829]    24 Hour Events:  Afebrile, VSS - tachycardic at times  Hgb stable - 8.8  Ongoing pain and N/V  Continues TPN  Considering hospice    Transfusions: None  Replacements: None    ROS:  Constitutional: Positive for malaise and fatigue; negative for fever, chills, weakness. CV: Negative for chest pain, palpitations, edema. Respiratory: Negative for dyspnea, cough, wheezing. GI: Positive for nausea and abdominal pain; negative for diarrhea. 10 point review of systems is otherwise negative with the exception of the elements mentioned above in the HPI.        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- Intr[facility] Plus    Anemia     Chemotherapy induced nausea and vomiting 10/14/2022    Colon cancer ([address]) dx 2022    followed by dr Sarita EL-19 2020    no hospitalization    GERD (gastroesophageal reflux disease)     managed with med    History of blood transfusion     History of colonoscopy 2018    louis Don (see media note), R     Hx of blood clots 2022    per pt \"small clot on lung identified by CT scan\"  CT Scan impression:- Nonobstructive pulmonary filling defect involving Left Lower Lobe     Hypotension     asymptomatic    Intractable pain 2023    Obstruction of fallopian tube     per pt has \"1 good tube\"    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 Patient A/O x 3 in no distress. No complaints of pain. No SOB noted. Orientation to room provided. Call light and personal items placed in reach. Primary Nurse Corey Harrell RN and Rajat Kang RN performed a dual skin assessment on this patient No impairment noted Juno score is 18 
 
7:57 AM -Bedside shift change report given to Kat Brush (oncoming nurse) by Christ Fabian RN (offgoing nurse). Report given with SBAR, Kardex, Intake/Output, MAR and Recent Results. 133 - 143 mmol/L    Potassium 3.8 3.5 - 5.1 mmol/L    Chloride 117 (H) 101 - 110 mmol/L    CO2 25 21 - 32 mmol/L    Anion Gap 4 2 - 11 mmol/L    Glucose 182 (H) 65 - 100 mg/dL    BUN 25 (H) 6 - 23 MG/DL    Creatinine 0.50 (L) 0.6 - 1.0 MG/DL    Est, Glom Filt Rate >60 >60 ml/min/1.73m2    Calcium 8.0 (L) 8.3 - 10.4 MG/DL    Total Bilirubin 0.4 0.2 - 1.1 MG/DL    ALT 33 12 - 65 U/L    AST 20 15 - 37 U/L    Alk Phosphatase 172 (H) 50 - 136 U/L    Total Protein 5.0 (L) 6.3 - 8.2 g/dL    Albumin 1.7 (L) 3.5 - 5.0 g/dL    Globulin 3.3 2.8 - 4.5 g/dL    Albumin/Globulin Ratio 0.5 0.4 - 1.6         Imaging:  CT Result (most recent):  CT ABDOMEN PELVIS W IV CONTRAST 08/27/2023    Narrative  EXAMINATION: CT SCAN OF THE ABDOMEN AND PELVIS WITH INTRAVENOUS CONTRAST    DATE OF EXAM: 8/27/2023 6:45 PM    HISTORY: Abdominal pain, GI bleeding, History of undifferentiated carcinoma    COMPARISON: 8/14/2023. TECHNIQUE: CT examination of the abdomen and pelvis was performed following the  intravenous administration of 100 mL  Isovue-370. CT dose lowering techniques  were used, to include: automated exposure control, adjustment for patient size,  and/or use of iterative reconstruction. FINDINGS:    ABDOMEN/PELVIS:    Lower Chest: Small pleural effusions, right greater than left. Subjacent  atelectasis. Liver: Normal.    Gallbladder/Biliary: Normal.    Pancreas: Normal.    Spleen: Normal.    Adrenal Glands: Normal.    Kidneys: Moderate right hydroureteronephrosis is similar to the comparison exam.  Slightly increased mild left hydronephrosis. GI Tract: No bowel obstruction. Moderate to large stool burden. Normal appendix. Postsurgical changes of the stomach. Fluid in the distal esophagus with mild  circumferential thickening.     Mesentery/Peritoneum: Redemonstrated large multiloculated fluid collection  throughout the abdomen in greatest in the pelvis which appears similar in size  to the comparison exam. Stable drainage take in PO. She agreed to continue with single-agent taxol and has declined palliative/hospice services after multiple conversations. She received Taxol 8/24. She was able to obtain home Reglan pump. Ms Agustina Linn presented on day of admission with hematemesis. CT CAP showed stable BL pleural effusions and large multiloculated fluid collection in abdomen with stable drainage cathter as well as similar/stable BL hydronephrosis and stable findings of GERD. CT did note moderate-large stool burden. Hgb on arrival 8.0 and down to 7.4 this AM (likely component of dilution - on IVF). GI consulted. Leukocytosis noted on admission (BC and procal WNL). On Cefepime and Vancomycin. Oncology consulted to assume care. 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 Dr Crenshaw Citizen discussed limitations in further treatment as well as concern for POD on taxol. Declined hospice and wished to continue with single-agent Taxol (last received 8/24)  8/29 Palliative care following and discussing goals of care, per today's note patient wishes to continue treatment   8/31 She is now considering her options. , including hospice. Has not made decision yet. Hematemesis  - GI consulted, had recent EGD with gastric mass and has known esophagitis/GERD  - On PPI BID  8/29 EGD-ulcerated stomach mass with active bleeding, s/p apc and hemospray continue PPI IV bid, keep hgb >8 per GI  8/30 Hgb 9.6 - stable. No further bleeding reported. 8/31 Hgb 8.8 - no bleeding     Anemia, blood loss secondary to gastric mass  - Secondary to recent chemo vs hematemesis - monitor  - Transfuse for Hgb <7  8/29 Hgb stable at 8.4 after transfusion for hgb of  6.7  8/31 Hgb stable     Leukocytosis  - On Cefe/Vanc. BC pending. Procal/LA WNL. De-escalate as appropriate.   8/29 leukocytosis decreased strength

## 2024-05-26 ENCOUNTER — EMERGENCY (EMERGENCY)
Facility: HOSPITAL | Age: 89
LOS: 1 days | Discharge: HOME | End: 2024-05-26
Attending: EMERGENCY MEDICINE | Admitting: EMERGENCY MEDICINE
Payer: COMMERCIAL

## 2024-05-26 VITALS
DIASTOLIC BLOOD PRESSURE: 58 MMHG | WEIGHT: 173.06 LBS | SYSTOLIC BLOOD PRESSURE: 135 MMHG | HEIGHT: 68 IN | TEMPERATURE: 97 F | OXYGEN SATURATION: 97 % | RESPIRATION RATE: 17 BRPM | HEART RATE: 55 BPM

## 2024-05-26 DIAGNOSIS — Z98.49 CATARACT EXTRACTION STATUS, UNSPECIFIED EYE: Chronic | ICD-10-CM

## 2024-05-26 LAB
ALBUMIN SERPL ELPH-MCNC: 3.2 G/DL — LOW (ref 3.3–5)
ALP SERPL-CCNC: 79 U/L — SIGNIFICANT CHANGE UP (ref 40–120)
ALT FLD-CCNC: 25 U/L — SIGNIFICANT CHANGE UP (ref 12–78)
ANION GAP SERPL CALC-SCNC: 6 MMOL/L — SIGNIFICANT CHANGE UP (ref 5–17)
APPEARANCE UR: CLEAR — SIGNIFICANT CHANGE UP
APTT BLD: 33.7 SEC — SIGNIFICANT CHANGE UP (ref 24.5–35.6)
AST SERPL-CCNC: 21 U/L — SIGNIFICANT CHANGE UP (ref 15–37)
BASOPHILS # BLD AUTO: 0.03 K/UL — SIGNIFICANT CHANGE UP (ref 0–0.2)
BASOPHILS NFR BLD AUTO: 0.5 % — SIGNIFICANT CHANGE UP (ref 0–2)
BILIRUB SERPL-MCNC: 0.3 MG/DL — SIGNIFICANT CHANGE UP (ref 0.2–1.2)
BILIRUB UR-MCNC: NEGATIVE — SIGNIFICANT CHANGE UP
BUN SERPL-MCNC: 36 MG/DL — HIGH (ref 7–23)
CALCIUM SERPL-MCNC: 9.1 MG/DL — SIGNIFICANT CHANGE UP (ref 8.5–10.1)
CHLORIDE SERPL-SCNC: 110 MMOL/L — HIGH (ref 96–108)
CO2 SERPL-SCNC: 24 MMOL/L — SIGNIFICANT CHANGE UP (ref 22–31)
COLOR SPEC: YELLOW — SIGNIFICANT CHANGE UP
CREAT SERPL-MCNC: 1.6 MG/DL — HIGH (ref 0.5–1.3)
DIFF PNL FLD: NEGATIVE — SIGNIFICANT CHANGE UP
EGFR: 41 ML/MIN/1.73M2 — LOW
EOSINOPHIL # BLD AUTO: 0.15 K/UL — SIGNIFICANT CHANGE UP (ref 0–0.5)
EOSINOPHIL NFR BLD AUTO: 2.4 % — SIGNIFICANT CHANGE UP (ref 0–6)
GLUCOSE SERPL-MCNC: 147 MG/DL — HIGH (ref 70–99)
GLUCOSE UR QL: >=1000 MG/DL
HCT VFR BLD CALC: 37.9 % — LOW (ref 39–50)
HGB BLD-MCNC: 12.8 G/DL — LOW (ref 13–17)
IMM GRANULOCYTES NFR BLD AUTO: 0.2 % — SIGNIFICANT CHANGE UP (ref 0–0.9)
INR BLD: 0.98 RATIO — SIGNIFICANT CHANGE UP (ref 0.85–1.18)
KETONES UR-MCNC: ABNORMAL MG/DL
LACTATE SERPL-SCNC: 1.6 MMOL/L — SIGNIFICANT CHANGE UP (ref 0.7–2)
LEUKOCYTE ESTERASE UR-ACNC: NEGATIVE — SIGNIFICANT CHANGE UP
LYMPHOCYTES # BLD AUTO: 2.12 K/UL — SIGNIFICANT CHANGE UP (ref 1–3.3)
LYMPHOCYTES # BLD AUTO: 33.8 % — SIGNIFICANT CHANGE UP (ref 13–44)
MCHC RBC-ENTMCNC: 33.2 PG — SIGNIFICANT CHANGE UP (ref 27–34)
MCHC RBC-ENTMCNC: 33.8 GM/DL — SIGNIFICANT CHANGE UP (ref 32–36)
MCV RBC AUTO: 98.2 FL — SIGNIFICANT CHANGE UP (ref 80–100)
MONOCYTES # BLD AUTO: 0.65 K/UL — SIGNIFICANT CHANGE UP (ref 0–0.9)
MONOCYTES NFR BLD AUTO: 10.4 % — SIGNIFICANT CHANGE UP (ref 2–14)
NEUTROPHILS # BLD AUTO: 3.32 K/UL — SIGNIFICANT CHANGE UP (ref 1.8–7.4)
NEUTROPHILS NFR BLD AUTO: 52.7 % — SIGNIFICANT CHANGE UP (ref 43–77)
NITRITE UR-MCNC: NEGATIVE — SIGNIFICANT CHANGE UP
NRBC # BLD: 0 /100 WBCS — SIGNIFICANT CHANGE UP (ref 0–0)
PH UR: 5.5 — SIGNIFICANT CHANGE UP (ref 5–8)
PLATELET # BLD AUTO: 159 K/UL — SIGNIFICANT CHANGE UP (ref 150–400)
POTASSIUM SERPL-MCNC: 4.3 MMOL/L — SIGNIFICANT CHANGE UP (ref 3.5–5.3)
POTASSIUM SERPL-SCNC: 4.3 MMOL/L — SIGNIFICANT CHANGE UP (ref 3.5–5.3)
PROT SERPL-MCNC: 6.9 G/DL — SIGNIFICANT CHANGE UP (ref 6–8.3)
PROT UR-MCNC: SIGNIFICANT CHANGE UP MG/DL
PROTHROM AB SERPL-ACNC: 11.5 SEC — SIGNIFICANT CHANGE UP (ref 9.5–13)
RBC # BLD: 3.86 M/UL — LOW (ref 4.2–5.8)
RBC # FLD: 13.6 % — SIGNIFICANT CHANGE UP (ref 10.3–14.5)
SODIUM SERPL-SCNC: 140 MMOL/L — SIGNIFICANT CHANGE UP (ref 135–145)
SP GR SPEC: 1.02 — SIGNIFICANT CHANGE UP (ref 1–1.03)
UROBILINOGEN FLD QL: 1 MG/DL — SIGNIFICANT CHANGE UP (ref 0.2–1)
WBC # BLD: 6.28 K/UL — SIGNIFICANT CHANGE UP (ref 3.8–10.5)
WBC # FLD AUTO: 6.28 K/UL — SIGNIFICANT CHANGE UP (ref 3.8–10.5)

## 2024-05-26 PROCEDURE — 99285 EMERGENCY DEPT VISIT HI MDM: CPT

## 2024-05-26 PROCEDURE — 73590 X-RAY EXAM OF LOWER LEG: CPT | Mod: 26,LT

## 2024-05-26 PROCEDURE — 71045 X-RAY EXAM CHEST 1 VIEW: CPT | Mod: 26

## 2024-05-26 RX ORDER — VANCOMYCIN HCL 1 G
1000 VIAL (EA) INTRAVENOUS ONCE
Refills: 0 | Status: COMPLETED | OUTPATIENT
Start: 2024-05-26 | End: 2024-05-26

## 2024-05-26 RX ORDER — PIPERACILLIN AND TAZOBACTAM 4; .5 G/20ML; G/20ML
3.38 INJECTION, POWDER, LYOPHILIZED, FOR SOLUTION INTRAVENOUS ONCE
Refills: 0 | Status: COMPLETED | OUTPATIENT
Start: 2024-05-26 | End: 2024-05-26

## 2024-05-26 RX ORDER — SODIUM CHLORIDE 9 MG/ML
1000 INJECTION INTRAMUSCULAR; INTRAVENOUS; SUBCUTANEOUS ONCE
Refills: 0 | Status: COMPLETED | OUTPATIENT
Start: 2024-05-26 | End: 2024-05-26

## 2024-05-26 RX ORDER — ACETAMINOPHEN 500 MG
1000 TABLET ORAL ONCE
Refills: 0 | Status: COMPLETED | OUTPATIENT
Start: 2024-05-26 | End: 2024-05-26

## 2024-05-26 RX ADMIN — SODIUM CHLORIDE 1000 MILLILITER(S): 9 INJECTION INTRAMUSCULAR; INTRAVENOUS; SUBCUTANEOUS at 23:37

## 2024-05-26 RX ADMIN — Medication 400 MILLIGRAM(S): at 23:37

## 2024-05-26 RX ADMIN — PIPERACILLIN AND TAZOBACTAM 200 GRAM(S): 4; .5 INJECTION, POWDER, LYOPHILIZED, FOR SOLUTION INTRAVENOUS at 23:52

## 2024-05-26 NOTE — ED ADULT TRIAGE NOTE - HEART RATE (BEATS/MIN)
Called and spoke to Nora hollingsworth     Made her an appointment for 11/15 @ 815 am with Dr. Moore     Added appointment to the wait list     Mariola Pineda Communication Facilitator on 9/26/2023 at 4:28 PM    
55

## 2024-05-26 NOTE — ED PROVIDER NOTE - NSFOLLOWUPCLINICS_GEN_ALL_ED_FT
[facility] Care  [address] [place] [address]  Phone: [phone]  Fax:   Follow Up Time: 4-6 Days

## 2024-05-26 NOTE — ED ADULT NURSE NOTE - HOW OFTEN DO YOU HAVE SIX OR MORE DRINKS ON ONE OCCASION?
[place] [address] Internal Medicine    Discharge Summary     Patient ID: Katie Beverly  :  1951   MRN: [id number]     ACCOUNT:  [de-identified]   Patient's PCP: Gilbert Lima MD  Admit Date: 2022   Discharge Date: 2022  Length of Stay: 8  Code Status:  Full Code  Admitting Physician: Quincy Rodrigues MD  Discharge Physician: Joanne Hogan MD     Active Discharge Diagnoses:     Primary Problem  Pulmonary embolus [facility])      [place] Problems    Diagnosis Date Noted    Pulmonary embolus ([facility] [address] [I26.99] 2022     Priority: Medium    Liver mass [R16.0]      Priority: Medium    Diabetes mellitus type 2 in obese ([facility] [address] [E11.69, E66.9] 2019    Morbid obesity ([facility] [address] [E66.01] 2019       Admission Condition:  fair     Discharged Condition: fair    Hospital Stay:     Hospital Course: Katie Beverly is a 80 y.o. female who was admitted for the management of Pulmonary embolus [facility]) , presented to ER with Fatigue and Abdominal Pain  78-year-old female presenting with fatigue and shortness of breath  History of COPD, CAD s/p CABG , type II DM, hypertension, morbid obesity, SAGAR  Recently diagnosed metastatic liver disease last hospital discharge   Noted to have new PE on CT angiogram  Acute PE right upper and middle lobe- initially on heparin infusion, switched to Eliquis. Patient to remain on Plavix and Eliquis indefinitely  Metastatic liver disease- status post liver biopsy on , pathology pending  Infrarenal AAA 3.1 cm- will need outpatient follow-up  Type 2 diabetes- sliding scale  Coronary artery disease status post CABG continue Plavix. aspirin discontinued  COPD currently compensated  Hypertension- blood pressure low on home medication, lisinopril discontinued  Class 2 obesity- Body mass index is 36.34 kg/m².   Episodes of chest pain during inpatient stay-evaluated by cardiologist chest pain determined to be atypical      Significant therapeutic interventions: As above    Significant Diagnostic Studies:   Labs / Micro:    Lab Results   Component Value Date    WBC 11.8 (H) 07/05/2022    HGB 10.0 (L) 07/05/2022    HCT 30.6 (L) 07/05/2022    MCV 88.2 07/05/2022     (H) 07/05/2022          Lab Results   Component Value Date/Time     07/05/2022 05:31 AM    K 3.9 07/05/2022 05:31 AM    CL 94 07/05/2022 05:31 AM    CO2 30 07/05/2022 05:31 AM    BUN 7 07/05/2022 05:31 AM    CREATININE 0.58 07/05/2022 05:31 AM    GLUCOSE 110 07/05/2022 05:31 AM    CALCIUM 8.9 07/05/2022 05:31 AM          Radiology:    ECHO Complete 2D W Doppler W Color    Result Date: 6/29/2022  [address] Transthoracic Echocardiography Report (TTE)  Patient Name [facility] Date of Study               06/29/2022               [facility]   Date of      1951  Gender                      Female  Birth   Age          80 year(s)  Race                           Room Number  2095        Height:                     60 inch, 152.4 cm   Corporate ID [id number]    Weight:                     185 pounds, 83.9 kg  #   Patient Acct [de-identified]   BSA:          1.81 m^2      BMI:      36.13  #                                                              kg/m^2   MR #         [id number]      Sonographer                 Migdalia Valle   Accession #  [id number]  Interpreting Physician      [facility]   Fellow                   Referring Nurse                           Practitioner   Interpreting             Referring Physician         Aristides Damon  Fellow  Type of Study   TTE procedure:2D Echocardiogram, M-Mode, Doppler, Color Doppler. Procedure Date Date: 06/29/2022 Start: 09:45 AM Study Location: [address] [place] [address] Technical Quality: Fair visualization Indications:Edema and Pulmonary embolus. History / Tech.  Comments: CABG X2 Patient Status: Inpatient Height: 60 inches Weight: 185 pounds BSA: 1.81 m^2 BMI: 36.13 kg/m^2 Rhythm: Within normal limits HR: 69 bpm CONCLUSIONS Summary Technically challenging study, walls not well visualized, within above limitation: Normal left ventricle size, wall thickness and function with an estimated EF 50%. Normal right ventricular size and function. Mild mitral regurgitation. Trivial tricuspid regurgitation. Normal right ventricular systolic pressure. No significant pericardial effusion is seen. Signature ----------------------------------------------------------------------------  Electronically signed by Migdalia Valle(Sonographer) on 06/29/2022 10:09  AM ---------------------------------------------------------------------------- ----------------------------------------------------------------------------  Electronically signed by Jerome Anderson(Interpreting physician) on 06/29/2022  10:32 AM ---------------------------------------------------------------------------- FINDINGS Left Atrium Left atrium is normal in size. Left Ventricle Technically challenging study, walls not well visualized, within above limitation: Normal left ventricle size, wall thickness and function with an estimated EF 50%. Right Atrium Right atrium is normal in size. Right Ventricle Normal right ventricular size and function. Mitral Valve Normal mitral valve structure and function. Mild mitral regurgitation. Aortic Valve Normal aortic valve structure and function without stenosis or regurgitation. Tricuspid Valve Normal tricuspid valve structure and function. Trivial tricuspid regurgitation. Normal right ventricular systolic pressure. Pulmonic Valve The pulmonic valve is normal in structure. No pulmonic insufficiency. Pericardial Effusion No significant pericardial effusion is seen. Miscellaneous Normal aortic root dimension. E/e' average 15.4 IVC normal diameter & inspiratory collapse indicating normal RA filling pressure .  M-mode / 2D Measurements & Calculations:   LVIDd:4.55 cm(3.7 - 5.6 cm)      Diastolic TPTIKY:18.3 ml  KKTTT:5.97 achievable. COMPARISON: None CT abdomen pelvis 06/22/2022 HISTORY: ORDERING SYSTEM PROVIDED HISTORY: elevated D dimer, SOB TECHNOLOGIST PROVIDED HISTORY: elevated D dimer, SOB Decision Support Exception - unselect if not a suspected or confirmed emergency medical condition->Emergency Medical Condition (MA) Reason for Exam: elevated D dimer, SOB Relevant Medical/Surgical History: hx. of smoking and COPD. pt. states \"I was here lst week and they said I have cancer of some kind;not sure what kind yet. \"; ORDERING SYSTEM PROVIDED HISTORY: worsened abd pain, hx metastatic ca TECHNOLOGIST PROVIDED HISTORY: worsened abd pain, hx metastatic ca Decision Support Exception - unselect if not a suspected or confirmed emergency medical condition->Emergency Medical Condition (MA) Reason for Exam: pt. c/o SOB, body pains Relevant Medical/Surgical History: hx. of smoking, COPD. pt. states \"I was ere last week, and they said I have some kind of cancer. \" FINDINGS: Chest: Pulmonary Arteries: Pulmonary arteries are adequately opacified for evaluation. There are small filling defects in the right interlobar artery extending into the posterior segmental and subsegmental right upper lobe branches and to a lesser degree into the segmental branches of the medial segment right middle lobe with overall small clot burden. No saddle embolus. Main pulmonary artery is normal in caliber. Mediastinum: No enlarged mediastinal nodes with a lower paratracheal node anterior to the sumit and some subcarinal nodes which are upper limits of normal in caliber. Morphologically abnormal suspicious right supradiaphragmatic lymph nodes. Cardiomegaly. Three-vessel coronary artery calcifications. No pericardial effusion. There is no acute abnormality of the thoracic aorta. Lungs/pleura: Increased right pleural effusion with increased atelectatic changes in the dependent right lung parenchyma, greatest in the right lower lobe.   Minimal left lower lobe atelectasis. Indeterminate 4 mm solid noncalcified nodule in the anterior segment left upper lobe. No pneumothorax or left-sided pleural effusion. Central airways are patent. Soft Tissues/Bones: Degenerative changes without lytic or blastic osseous lesion. Remote median sternotomy which is healed. Calcifications in both breasts with a rim calcified likely oil cyst in the medial right breast.  No axillary adenopathy. Abdomen/Pelvis: Organs: Again noted are innumerable hypodense masses in the liver compatible with hepatic metastases. There is poor delineation of the gallbladder from the adjacent liver masses. There is cholelithiasis as well as choledocholithiasis with intrahepatic and extrahepatic biliary ductal dilatation which is overall similar to 5 days prior. The abnormal soft tissue attenuation extends toward the gastroduodenal junction with loss of the intervening fat plane. The spleen is normal in size and attenuation. The pancreas is atrophic. No peripancreatic inflammatory changes. Adrenal glands remain normal caliber. Kidneys enhance symmetrically aside from a tiny hypodensity in the lateral upper pole left kidney that is too small to definitively characterize. There is no hydronephrosis. Normal caliber ureters. GI/Bowel: The bowel remains normal in caliber without obstruction. An appendix is not definitively identified. Pelvis: The urinary bladder is normal in appearance. Diminutive postmenopausal female pelvic organs. Peritoneum/Retroperitoneum: No free air. Small volume ascites. Irregular peritoneal soft tissue nodularity is redemonstrated compatible with peritoneal carcinomatosis. Enlarged lymph nodes about the yi hepatis. No gross pelvic adenopathy. Infrarenal abdominal aortic aneurysm measuring up to 3.1 cm on a background of moderate to heavy atherosclerosis. Bones/Soft Tissues: Degenerative changes predominating in the mid to lower lumbar facets.   No lytic or blastic osseous cm infrarenal abdominal aortic aneurysm. Recommend follow-up every 3 years. Reference: J Am Vish Radiol 0613;73:490-858. XR CHEST PORTABLE    Result Date: 6/27/2022  EXAMINATION: ONE XRAY VIEW OF THE CHEST 6/27/2022 4:39 pm COMPARISON: 03/15/2010 HISTORY: ORDERING SYSTEM PROVIDED HISTORY: SOB, CP TECHNOLOGIST PROVIDED HISTORY: SOB, CP Reason for Exam: PT CO cough with SOB and CP X several days. FINDINGS: Postsurgical changes overlying the mediastinum. The cardiac size is normal. Mild bibasal fibrosis and small right pleural effusion. .  Pulmonary vascularity appears normal. There is mild ectasia of the thoracic aorta. There are degenerative changes in the spine . No acute bony abnormalities. The hilar structures are normal.     Mild bibasal fibrosis and small right pleural effusion. CT CHEST PULMONARY EMBOLISM W CONTRAST    Result Date: 6/27/2022  EXAMINATION: CTA OF THE CHEST; CT OF THE ABDOMEN AND PELVIS WITH CONTRAST 6/27/2022 6:09 pm; 6/27/2022 6:20 pm TECHNIQUE: CTA of the chest was performed after the administration of intravenous contrast.  Multiplanar reformatted images are provided for review. MIP images are provided for review. Automated exposure control, iterative reconstruction, and/or weight based adjustment of the mA/kV was utilized to reduce the radiation dose to as low as reasonably achievable.; CT of the abdomen and pelvis was performed with the administration of intravenous contrast. Multiplanar reformatted images are provided for review. Automated exposure control, iterative reconstruction, and/or weight based adjustment of the mA/kV was utilized to reduce the radiation dose to as low as reasonably achievable.  COMPARISON: None CT abdomen pelvis 06/22/2022 HISTORY: ORDERING SYSTEM PROVIDED HISTORY: elevated D dimer, SOB TECHNOLOGIST PROVIDED HISTORY: elevated D dimer, SOB Decision Support Exception - unselect if not a suspected or confirmed emergency medical condition->Emergency Medical Condition (MA) Reason for Exam: elevated D dimer, SOB Relevant Medical/Surgical History: hx. of smoking and COPD. pt. states \"I was here lst week and they said I have cancer of some kind;not sure what kind yet. \"; ORDERING SYSTEM PROVIDED HISTORY: worsened abd pain, hx metastatic ca TECHNOLOGIST PROVIDED HISTORY: worsened abd pain, hx metastatic ca Decision Support Exception - unselect if not a suspected or confirmed emergency medical condition->Emergency Medical Condition (MA) Reason for Exam: pt. c/o SOB, body pains Relevant Medical/Surgical History: hx. of smoking, COPD. pt. states \"I was ere last week, and they said I have some kind of cancer. \" FINDINGS: Chest: Pulmonary Arteries: Pulmonary arteries are adequately opacified for evaluation. There are small filling defects in the right interlobar artery extending into the posterior segmental and subsegmental right upper lobe branches and to a lesser degree into the segmental branches of the medial segment right middle lobe with overall small clot burden. No saddle embolus. Main pulmonary artery is normal in caliber. Mediastinum: No enlarged mediastinal nodes with a lower paratracheal node anterior to the sumit and some subcarinal nodes which are upper limits of normal in caliber. Morphologically abnormal suspicious right supradiaphragmatic lymph nodes. Cardiomegaly. Three-vessel coronary artery calcifications. No pericardial effusion. There is no acute abnormality of the thoracic aorta. Lungs/pleura: Increased right pleural effusion with increased atelectatic changes in the dependent right lung parenchyma, greatest in the right lower lobe. Minimal left lower lobe atelectasis. Indeterminate 4 mm solid noncalcified nodule in the anterior segment left upper lobe. No pneumothorax or left-sided pleural effusion. Central airways are patent. Soft Tissues/Bones: Degenerative changes without lytic or blastic osseous lesion.   Remote median sternotomy which is healed. Calcifications in both breasts with a rim calcified likely oil cyst in the medial right breast.  No axillary adenopathy. Abdomen/Pelvis: Organs: Again noted are innumerable hypodense masses in the liver compatible with hepatic metastases. There is poor delineation of the gallbladder from the adjacent liver masses. There is cholelithiasis as well as choledocholithiasis with intrahepatic and extrahepatic biliary ductal dilatation which is overall similar to 5 days prior. The abnormal soft tissue attenuation extends toward the gastroduodenal junction with loss of the intervening fat plane. The spleen is normal in size and attenuation. The pancreas is atrophic. No peripancreatic inflammatory changes. Adrenal glands remain normal caliber. Kidneys enhance symmetrically aside from a tiny hypodensity in the lateral upper pole left kidney that is too small to definitively characterize. There is no hydronephrosis. Normal caliber ureters. GI/Bowel: The bowel remains normal in caliber without obstruction. An appendix is not definitively identified. Pelvis: The urinary bladder is normal in appearance. Diminutive postmenopausal female pelvic organs. Peritoneum/Retroperitoneum: No free air. Small volume ascites. Irregular peritoneal soft tissue nodularity is redemonstrated compatible with peritoneal carcinomatosis. Enlarged lymph nodes about the yi hepatis. No gross pelvic adenopathy. Infrarenal abdominal aortic aneurysm measuring up to 3.1 cm on a background of moderate to heavy atherosclerosis. Bones/Soft Tissues: Degenerative changes predominating in the mid to lower lumbar facets. No lytic or blastic osseous lesion. Small fat containing umbilical hernia with a peritoneal nodule at the hernia orifice. Mild anasarca. [CHEST Small pulmonary emboli in the right upper and right middle lobes with an overall small clot burden.  Increased right pleural effusion and associated atelectasis of the right lung. Indeterminate 4 mm nodule in the left upper lobe which will warrant attention on follow-up in this patient with findings of intra-abdominal malignancy. Morphologically abnormal appearing supradiaphragmatic lymph nodes on the right suspicious for possible intrathoracic metastatic josh spread. ABDOMEN/PELVIS Findings in the abdomen and pelvis are overall not substantially changed from 5 days prior. Diffuse hepatic metastases with loss of the fat plane between the liver and the gastroduodenal junction worrisome for possible direct extension. Poor delineation of the gallbladder either due to extension of the hepatic disease or possibly due to primary biliary neoplasm. Cholelithiasis and choledocholithiasis with intrahepatic and extrahepatic biliary ductal dilatation, unchanged from recent prior. Peritoneal carcinomatosis and small volume presumably malignant ascites. Infrarenal abdominal aortic aneurysm spanning up to 3.1 cm with recommendations below. Critical results were called by Dr. Latonya Santo to Dr. Sandy Zheng on 6/27/2022 at 10:22 PM EST. RECOMMENDATIONS: 3.1 cm infrarenal abdominal aortic aneurysm. Recommend follow-up every 3 years. Reference: J Am Vish Radiol 7097;66:818-064.      VL DUP LOWER EXTREMITY VENOUS BILATERAL    Result Date: 6/28/2022    [address] [place] [address]  Vascular Lower Extremities DVT Study Procedure   Patient Name    [facility]  Date of Study             06/28/2022                  [facility]d [facility]   Date of Birth   1951   Gender                    Female   Age             80 year(s)   Race                         Room Number     2095   Corporate ID #  [id number]   Patient Acct #  [de-identified]   MR #            [id number]       Mattie Horne   Accession #     [id number]   Interpreting Physician    Yee Amin   Referring Nurse              Referring Physician       Hina Prajapati DO  Practitioner  Procedure Type of Study:   Veins: Lower Extremities DVT Femoral                        !Yes       ! Yes            ! None      ! +------------------------------------+----------+---------------+----------+ ! Mid Femoral                         !Yes       ! Yes            ! None      ! +------------------------------------+----------+---------------+----------+ ! Dist Femoral                        !Yes       ! Yes            ! None      ! +------------------------------------+----------+---------------+----------+ ! Popliteal                           !Yes       ! Yes            ! None      ! +------------------------------------+----------+---------------+----------+ ! Sapheno Femoral Junction            ! Yes       ! Yes            ! None      ! +------------------------------------+----------+---------------+----------+ ! PTV                                 ! No        !               !          ! +------------------------------------+----------+---------------+----------+ ! Peroneal                            !No        !               !          ! +------------------------------------+----------+---------------+----------+ ! Gastroc                             ! Yes       ! Yes            ! None      ! +------------------------------------+----------+---------------+----------+ ! GSV Thigh                           ! Yes       ! Yes            ! None      ! +------------------------------------+----------+---------------+----------+ ! GSV Knee                            ! Yes       ! Yes            ! None      ! +------------------------------------+----------+---------------+----------+ ! GSV Ankle                           ! Yes       ! Yes            ! None      ! +------------------------------------+----------+---------------+----------+ ! SSV                                 ! Yes       ! Yes            ! None      ! +------------------------------------+----------+---------------+----------+ Right Doppler Measurements +---------------------------+------+------+--------------------------------+ !Location                   ! Signal!Reflux! Reflux (msec)                   ! +---------------------------+------+------+--------------------------------+ ! Common Femoral             !Phasic!      !                                ! +---------------------------+------+------+--------------------------------+ ! Prox Femoral               !Phasic!      !                                ! +---------------------------+------+------+--------------------------------+ ! Popliteal                  !Phasic!      !                                ! +---------------------------+------+------+--------------------------------+ Left Lower Extremities DVT Study Measurements Left 2D Measurements +------------------------------------+----------+---------------+----------+ ! Location                            ! Visualized! Compressibility! Thrombosis! +------------------------------------+----------+---------------+----------+ ! Common Femoral                      !Yes       ! Yes            ! None      ! +------------------------------------+----------+---------------+----------+ ! Prox Femoral                        !Yes       ! Yes            ! None      ! +------------------------------------+----------+---------------+----------+ ! Mid Femoral                         !Yes       ! Yes            ! None      ! +------------------------------------+----------+---------------+----------+ ! Dist Femoral                        !Yes       ! Yes            ! None      ! +------------------------------------+----------+---------------+----------+ ! Popliteal                           !Yes       ! Yes            ! None      ! +------------------------------------+----------+---------------+----------+ ! Sapheno Femoral Junction            ! Yes       ! Yes            ! None      ! +------------------------------------+----------+---------------+----------+ ! PTV                                 ! No        !               !          ! +------------------------------------+----------+---------------+----------+ ! Peroneal                            !No        !               !          ! +------------------------------------+----------+---------------+----------+ ! Gastroc                             ! Yes       ! Yes            ! None      ! +------------------------------------+----------+---------------+----------+ ! GSV Thigh                           ! Yes       ! Yes            ! None      ! +------------------------------------+----------+---------------+----------+ ! GSV Knee                            ! Yes       ! Yes            ! None      ! +------------------------------------+----------+---------------+----------+ ! GSV Ankle                           ! Yes       ! Yes            ! None      ! +------------------------------------+----------+---------------+----------+ ! SSV                                 ! Yes       ! Yes            ! None      ! +------------------------------------+----------+---------------+----------+ Left Doppler Measurements +---------------------------+------+------+--------------------------------+ ! Location                   ! Signal!Reflux! Reflux (msec)                   ! +---------------------------+------+------+--------------------------------+ ! Common Femoral             !Phasic!      !                                ! +---------------------------+------+------+--------------------------------+ ! Prox Femoral               !Phasic!      !                                ! +---------------------------+------+------+--------------------------------+ ! Popliteal                  !Phasic!      !                                ! +---------------------------+------+------+--------------------------------+    IR BIOPSY LIVER PERCUTANEOUS    Result Date: 6/29/2022  PROCEDURE: IR BIOPSY LIVER PERCUTANEOUS 6/29/2022 HISTORY: ORDERING SYSTEM PROVIDED HISTORY: Liver lesions suspicious for metastatic disease TECHNOLOGIST PROVIDED HISTORY: Liver lesion CONTRAST: None used SEDATION: Local anesthesia with fentanyl 50 mcg IV for pain. FLUOROSCOPY DOSE AND TYPE OR TIME AND EXPOSURES: None used DESCRIPTION OF PROCEDURE: Informed consent was obtained after a detailed explanation of the procedure including risks, benefits, and alternatives. Universal protocol was observed. Sterile gowns, masks, hats and gloves utilized for maximal sterile barrier. The upper abdomen was prepped in the normal fashion using all elements maximum sterile barrier technique. Right lobe lesions were assessed sonographically. Site was chosen for biopsy. Local anesthesia was administered with 1% lidocaine. 19 gauge coaxial needle was then inserted under direct ultrasound guidance, 3 20 gauge core biopsies were obtained and provided for immediate assessment for specimen adequacy by pathology on site. The needle was removed and hemostasis achieved. A Band-Aid was placed at the site. The patient tolerated procedure well. Vital signs were stable. FINDINGS: Good needle position within mass. Successful liver biopsy under ultrasound guidance, as above. Consultations:    Consults:     Final Specialist Recommendations/Findings:   IP CONSULT TO SPIRITUAL SERVICES  IP CONSULT TO SOCIAL WORK  IP CONSULT TO PALLIATIVE CARE  IP CONSULT TO ONCOLOGY  IP CONSULT TO CARDIOLOGY      The patient was seen and examined on day of discharge and this discharge summary is in conjunction with any daily progress note from day of discharge. Discharge plan:     Disposition: [facility]      Instructions to Patient: Keep follow-up appointments      Requiring Further Evaluation/Follow Up POST HOSPITALIZATION/Incidental Findings:     Physician Follow Up:     No follow-up provider specified.      Activity: Resume as directed    Discharge Medications:      Medication List      START taking these medications    ALPRAZolam 0.5 MG tablet  Commonly known as: XANAX  Take 1 tablet by mouth 3 times daily as needed for Anxiety for up to 5 days. * apixaban 5 MG Tabs tablet  Commonly known as: ELIQUIS  Take 2 tablets by mouth 2 times daily for 14 doses     * apixaban 5 MG Tabs tablet  Commonly known as: ELIQUIS  Take 1 tablet by mouth 2 times daily  Start taking on: July 8, 2022     clopidogrel 75 MG tablet  Commonly known as: PLAVIX  Take 1 tablet by mouth daily     docusate sodium 100 MG capsule  Commonly known as: COLACE  Take 1 capsule by mouth 2 times daily as needed for Constipation     gabapentin 300 MG capsule  Commonly known as: Neurontin  Take 1 capsule by mouth 3 times daily for 5 days. Replaces: gabapentin 600 MG tablet         * This list has 2 medication(s) that are the same as other medications prescribed for you. Read the directions carefully, and ask your doctor or other care provider to review them with you. CHANGE how you take these medications    * oxyCODONE HCl 5 MG Taba  Take 5 mg by mouth 4 times daily as needed (moderate to severe pain) for up to 5 days. What changed: Another medication with the same name was added. Make sure you understand how and when to take each. * oxyCODONE 10 MG extended release tablet  Commonly known as: OXYCONTIN  Take 1 tablet by mouth every 12 hours for 5 days. What changed: You were already taking a medication with the same name, and this prescription was added. Make sure you understand how and when to take each. * This list has 2 medication(s) that are the same as other medications prescribed for you. Read the directions carefully, and ask your doctor or other care provider to review them with you.             CONTINUE taking these medications    albuterol sulfate  (90 Base) MCG/ACT inhaler  Commonly known as: PROVENTIL;VENTOLIN;PROAIR  Inhale 2 puffs into the lungs every 6 hours as needed for Wheezing or Shortness of Breath     ARIPiprazole 5 MG tablet  Commonly known as: Abilify  Take 1 tablet by mouth daily atorvastatin 80 MG tablet  Commonly known as: LIPITOR  Take 1 tablet by mouth daily     bisoprolol 5 MG tablet  Commonly known as: ZEBETA  take 1 tablet by mouth once daily     blood glucose test strips  Test 3 times a day & as needed for symptoms of irregular blood glucose. Dispense sufficient amount for indicated testing frequency plus additional to accommodate PRN testing needs.      budesonide-formoterol 160-4.5 MCG/ACT Aero  Commonly known as: Symbicort  Inhale 2 puffs into the lungs 2 times daily     fluticasone 50 MCG/ACT nasal spray  Commonly known as: Flonase  2 sprays by Nasal route daily     glimepiride 2 MG tablet  Commonly known as: AMARYL  take 1 tablet by mouth twice a day before meals     * glucose monitoring kit  1 kit by Does not apply route daily     * glucose monitoring kit  1 kit by Does not apply route daily as needed (diabetic)     * FreeStyle Lite Zulma  1 Device by Does not apply route daily as needed (diabetic)     * FreeStyle Freedom Lite w/Device Kit  1 kit by Does not apply route daily     Insulin Pen Needle 32G X 4 MM Misc  1 each by Does not apply route once a week     Insulin Syringe-Needle U-100 30G X 5/16\" 0.5 ML Misc  1 each by Does not apply route daily     * Lancets Misc  1 each by Does not apply route 3 times daily     * FreeStyle Lancets Misc  1 each by Does not apply route daily     Melatonin 5 MG Subl  Place 1 applicator under the tongue nightly as needed (insomnia)     omeprazole 40 MG delayed release capsule  Commonly known as: PRILOSEC  take 1 capsule by mouth every morning BEFORE BREAKFAST     oxybutynin 5 MG tablet  Commonly known as: DITROPAN  take 1 tablet by mouth twice a day     Ozempic (1 MG/DOSE) 2 MG/1.5ML Sopn  Generic drug: Semaglutide (1 MG/DOSE)  Inject 1 mg per week     ranolazine 500 MG extended release tablet  Commonly known as: RANEXA  Take 1 tablet by mouth 2 times daily     therapeutic multivitamin-minerals tablet  Take 1 tablet by mouth daily tiotropium 2.5 MCG/ACT Aers inhaler  Commonly known as: Spiriva Respimat  Inhale 2 puffs into the lungs daily     traZODone 50 MG tablet  Commonly known as: DESYREL     * venlafaxine 75 MG extended release capsule  Commonly known as: Effexor XR  Take 1 capsule by mouth daily     * venlafaxine 150 MG extended release capsule  Commonly known as: EFFEXOR XR  Take 1 capsule by mouth daily Take with 75 mg capsule         * This list has 8 medication(s) that are the same as other medications prescribed for you. Read the directions carefully, and ask your doctor or other care provider to review them with you. STOP taking these medications    gabapentin 600 MG tablet  Commonly known as: NEURONTIN  Replaced by: gabapentin 300 MG capsule     lisinopril 10 MG tablet  Commonly known as: PRINIVIL;ZESTRIL           Where to Get Your Medications      These medications were sent to [facility] [address] [address]    Phone: [phone]   apixaban 5 MG Tabs tablet  apixaban 5 MG Tabs tablet  clopidogrel 75 MG tablet     You can get these medications from any pharmacy    Bring a paper prescription for each of these medications  ALPRAZolam 0.5 MG tablet  gabapentin 300 MG capsule  oxyCODONE 10 MG extended release tablet  oxyCODONE HCl 5 MG Taba     Information about where to get these medications is not yet available    Ask your nurse or doctor about these medications  docusate sodium 100 MG capsule         Time Spent on discharge is  35 mins in patient examination, evaluation, counseling as well as medication reconciliation, prescriptions for required medications, discharge plan and follow up. Electronically signed by   Clarence Arguello MD  7/5/2022  9:23 AM      Thank you Dr. Nelly Lomeli MD for the opportunity to be involved in this patient's care. Less than Monthly

## 2024-05-26 NOTE — ED PROVIDER NOTE - CLINICAL SUMMARY MEDICAL DECISION MAKING FREE TEXT BOX
89 yo M with a L leg wound x 2 weeks. Will update Td. Abx. Labs, cultures. CT to evaluate for extent of infection. XR to r/o OM and subQ gas. Will reassess after all results.

## 2024-05-26 NOTE — ED PROVIDER NOTE - NSFOLLOWUPINSTRUCTIONS_ED_ALL_ED_FT
Follow up with your doctor as scheduled in 3 days  You may also follow up in the Wound Care clinic, please call ahead to schedule an appointment  Return to the ER if redness or pain worsens or if patient develops a fever\    Cellulitis, Adult  A person's legs and feet. One leg is normal and the other leg is affected by cellulitis.  Cellulitis is a skin infection. The infected area is usually warm, red, swollen, and tender. It most commonly occurs on the lower body, such as the legs, feet, and toes, but this condition can occur on any part of the body. The infection can travel to the muscles, blood, and underlying tissue and become life-threatening without treatment. It is important to get medical treatment right away for this condition.    What are the causes?  Cellulitis is caused by bacteria. The bacteria enter through a break in the skin, such as a cut, burn, insect or animal bite, open sore, or crack.    What increases the risk?  This condition is more likely to occur in people who:  Have a weak body's defense system (immune system).  Are older than 60 years old.  Have diabetes.  Have a type of long-term (chronic) liver disease (cirrhosis) or kidney disease.  Are obese.  Have a skin condition such as:  An itchy rash, such as eczema or psoriasis.  A fungal rash on the feet or in skinfolds.  Blistering rashes, such as shingles or chickenpox.  Slow movement of blood in the veins (venous stasis).  Fluid buildup below the skin (edema).  Have open wounds on the skin, such as cuts, puncture wounds, burns, bites, scrapes, tattoos, piercings, or wounds from surgery.  Have had radiation therapy.  Use IV drugs.  What are the signs or symptoms?  Symptoms of this condition include:  Skin that looks red, purple, or slightly darker than your usual skin color.  Streaks or spots on the skin.  Swollen area of the skin.  Tenderness or pain when an area of the skin is touched.  Warm skin.  Fever or chills.  Blisters.  Tiredness (fatigue).  How is this diagnosed?  This condition is diagnosed based on a medical history and physical exam. You may also have tests, including:  Blood tests.  Imaging tests.  Tests on a sample of fluid taken from the wound (wound culture).  How is this treated?  Treatment for this condition may include:  Medicines. These may include antibiotics or medicines to treat allergies (antihistamines).  Rest.  Applying cold or warm wet cloths (compresses) to the skin.  If the condition is severe, you may need to stay in the hospital and get antibiotics through an IV.  The infection usually starts to get better within 1–2 days of treatment.    Follow these instructions at home:  Medicines    Take over-the-counter and prescription medicines only as told by your health care provider.  If you were prescribed antibiotics, take them as told by your provider. Do not stop using the antibiotic even if you start to feel better.  General instructions    Drink enough fluid to keep your pee (urine) pale yellow.  Do not touch or rub the infected area.  Raise (elevate) the infected area above the level of your heart while you are sitting or lying down.  Return to your normal activities as told by your provider. Ask your provider what activities are safe for you.  Apply warm or cold compresses to the affected area as told by your provider.  Keep all follow-up visits. Your provider will need to make sure that a more serious infection is not developing.  Contact a health care provider if:  You have a fever.  Your symptoms do not improve within 1–2 days of starting treatment or you develop new symptoms.  Your bone or joint underneath the infected area becomes painful after the skin has healed.  Your infection returns in the same area or another area. Signs of this may include:  You notice a swollen bump in the infected area.  Your red area gets larger, turns dark in color, or becomes more painful.  Drainage increases.  Pus or a bad smell develops in your infected area.  You have more pain.  You feel ill and have muscle aches and weakness.  You develop vomiting or diarrhea that will not go away.  Get help right away if:  You notice red streaks coming from the infected area.  You notice the skin turns purple or black and falls off.  This symptom may be an emergency. Get help right away. Call 911.  Do not wait to see if the symptom will go away.  Do not drive yourself to the hospital.  This information is not intended to replace advice given to you by your health care provider. Make sure you discuss any questions you have with your health care provider.

## 2024-05-26 NOTE — ED PROVIDER NOTE - PATIENT PORTAL LINK FT
You can access the FollowMyHealth Patient Portal offered by [facility] by registering at the following website: [email or web address]. By joining [facility] FollowMyHealth portal, you will also be able to view your health information using other applications (apps) compatible with our system.

## 2024-05-26 NOTE — ED ADULT NURSE NOTE - OBJECTIVE STATEMENT
Son stated pt was observed with non healing wound to left calf ongoing for 2 weeks. Unsure of how it started. Pt denies pain, denies fever, denies chills, denies weakness. Pt is known diabetic.  Would is superficial with a mixed color presentation. Surrounding area is red, no drainage or pus observed. Leg is swollen from knee to ankle

## 2024-05-26 NOTE — ED PROVIDER NOTE - PROGRESS NOTE DETAILS
Labs reviewed.  X-ray does not show any signs of osteomyelitis.  CT does not show any deep tissue infection.  Patient's vital signs are stable.  Patient is in no distress.  Patient is with family member who takes care of patient.  Patient is reliable for follow-up with his primary care doctor, son reports he has an appointment on Thursday to see his primary.  Advised to return to the ED if pain or redness worsens or develops a fever.

## 2024-05-26 NOTE — ED ADULT NURSE NOTE - CHPI ED NUR SYMPTOMS NEG
no bleeding at site/no chills/no drainage/no fever/no pain/no purulent drainage/no rectal pain/no vomiting

## 2024-05-26 NOTE — ED PROVIDER NOTE - OBJECTIVE STATEMENT
89 yo M with hx of DM, HTN BIB family member with c/o L leg wound x 2 weeks. Family member states pt believes he sustained a small cut from a sharp object near his bed. Denies fever. Ambulates ok without difficulty. Reports DM is well controlled.

## 2024-05-26 NOTE — ED PROVIDER NOTE - MUSCULOSKELETAL MINIMAL EXAM
LLE: 2cm ulcer to calf, with central necrosis, mild discharge, surrounding 10cm erythema, +tenderness
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
## 2024-05-27 VITALS
HEART RATE: 58 BPM | SYSTOLIC BLOOD PRESSURE: 170 MMHG | OXYGEN SATURATION: 98 % | DIASTOLIC BLOOD PRESSURE: 70 MMHG | RESPIRATION RATE: 16 BRPM | TEMPERATURE: 98 F

## 2024-05-27 PROCEDURE — 71045 X-RAY EXAM CHEST 1 VIEW: CPT

## 2024-05-27 PROCEDURE — 73700 CT LOWER EXTREMITY W/O DYE: CPT | Mod: 26,LT,MC

## 2024-05-27 PROCEDURE — 85610 PROTHROMBIN TIME: CPT

## 2024-05-27 PROCEDURE — 99284 EMERGENCY DEPT VISIT MOD MDM: CPT | Mod: 25

## 2024-05-27 PROCEDURE — 73700 CT LOWER EXTREMITY W/O DYE: CPT | Mod: MC

## 2024-05-27 PROCEDURE — 85025 COMPLETE CBC W/AUTO DIFF WBC: CPT

## 2024-05-27 PROCEDURE — 85730 THROMBOPLASTIN TIME PARTIAL: CPT

## 2024-05-27 PROCEDURE — 36415 COLL VENOUS BLD VENIPUNCTURE: CPT

## 2024-05-27 PROCEDURE — 80053 COMPREHEN METABOLIC PANEL: CPT

## 2024-05-27 PROCEDURE — 81003 URINALYSIS AUTO W/O SCOPE: CPT

## 2024-05-27 PROCEDURE — 96374 THER/PROPH/DIAG INJ IV PUSH: CPT

## 2024-05-27 PROCEDURE — 96375 TX/PRO/DX INJ NEW DRUG ADDON: CPT

## 2024-05-27 PROCEDURE — 87040 BLOOD CULTURE FOR BACTERIA: CPT

## 2024-05-27 PROCEDURE — 87086 URINE CULTURE/COLONY COUNT: CPT

## 2024-05-27 PROCEDURE — 73590 X-RAY EXAM OF LOWER LEG: CPT

## 2024-05-27 PROCEDURE — 83605 ASSAY OF LACTIC ACID: CPT

## 2024-05-27 RX ORDER — TETANUS TOXOID, REDUCED DIPHTHERIA TOXOID AND ACELLULAR PERTUSSIS VACCINE, ADSORBED 5; 2.5; 8; 8; 2.5 [IU]/.5ML; [IU]/.5ML; UG/.5ML; UG/.5ML; UG/.5ML
0.5 SUSPENSION INTRAMUSCULAR ONCE
Refills: 0 | Status: DISCONTINUED | OUTPATIENT
Start: 2024-05-27 | End: 2024-05-30

## 2024-05-27 RX ORDER — CEPHALEXIN 500 MG
1 CAPSULE ORAL
Qty: 28 | Refills: 0
Start: 2024-05-27 | End: 2024-06-02

## 2024-05-27 RX ADMIN — Medication 250 MILLIGRAM(S): at 01:33

## 2024-05-28 LAB
CULTURE RESULTS: SIGNIFICANT CHANGE UP
SPECIMEN SOURCE: SIGNIFICANT CHANGE UP

## 2024-06-01 LAB
CULTURE RESULTS: SIGNIFICANT CHANGE UP
CULTURE RESULTS: SIGNIFICANT CHANGE UP
SPECIMEN SOURCE: SIGNIFICANT CHANGE UP
SPECIMEN SOURCE: SIGNIFICANT CHANGE UP

## 2024-06-03 ENCOUNTER — NON-APPOINTMENT (OUTPATIENT)
Age: 89
End: 2024-06-03

## 2024-06-03 ENCOUNTER — APPOINTMENT (OUTPATIENT)
Dept: WOUND CARE | Facility: HOSPITAL | Age: 89
End: 2024-06-03
Payer: MEDICARE

## 2024-06-03 ENCOUNTER — OUTPATIENT (OUTPATIENT)
Dept: OUTPATIENT SERVICES | Facility: HOSPITAL | Age: 89
LOS: 1 days | Discharge: HOME | End: 2024-06-03
Payer: COMMERCIAL

## 2024-06-03 VITALS
SYSTOLIC BLOOD PRESSURE: 119 MMHG | BODY MASS INDEX: 25.76 KG/M2 | DIASTOLIC BLOOD PRESSURE: 65 MMHG | HEART RATE: 43 BPM | TEMPERATURE: 97.7 F | WEIGHT: 170 LBS | HEIGHT: 68 IN | OXYGEN SATURATION: 97 % | RESPIRATION RATE: 18 BRPM

## 2024-06-03 DIAGNOSIS — Z75.8 OTHER PROBLEMS RELATED TO MEDICAL FACILITIES AND OTHER HEALTH CARE: ICD-10-CM

## 2024-06-03 DIAGNOSIS — Z98.49 CATARACT EXTRACTION STATUS, UNSPECIFIED EYE: Chronic | ICD-10-CM

## 2024-06-03 DIAGNOSIS — L97.801 NON-PRESSURE CHRONIC ULCER OF OTHER PART OF UNSPECIFIED LOWER LEG LIMITED TO BREAKDOWN OF SKIN: ICD-10-CM

## 2024-06-03 DIAGNOSIS — I10 ESSENTIAL (PRIMARY) HYPERTENSION: ICD-10-CM

## 2024-06-03 DIAGNOSIS — E11.9 TYPE 2 DIABETES MELLITUS W/OUT COMPLICATIONS: ICD-10-CM

## 2024-06-03 PROCEDURE — 99203 OFFICE O/P NEW LOW 30 MIN: CPT

## 2024-06-03 PROCEDURE — G0463: CPT

## 2024-06-03 RX ORDER — MUPIROCIN 20 MG/G
2 OINTMENT TOPICAL
Qty: 1 | Refills: 0 | Status: COMPLETED | COMMUNITY
Start: 2019-07-09 | End: 2024-06-03

## 2024-06-03 RX ORDER — MUPIROCIN 20 MG/G
2 OINTMENT TOPICAL
Qty: 1 | Refills: 0 | Status: COMPLETED | COMMUNITY
Start: 2019-05-29 | End: 2024-06-03

## 2024-06-03 RX ORDER — MUPIROCIN 20 MG/G
2 OINTMENT TOPICAL
Qty: 1 | Refills: 0 | Status: COMPLETED | COMMUNITY
Start: 2019-05-31 | End: 2024-06-03

## 2024-06-05 DIAGNOSIS — I10 ESSENTIAL (PRIMARY) HYPERTENSION: ICD-10-CM
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
## 2024-06-05 DIAGNOSIS — E11.9 TYPE 2 DIABETES MELLITUS WITHOUT COMPLICATIONS: ICD-10-CM

## 2024-06-05 DIAGNOSIS — Z96.652 PRESENCE OF LEFT ARTIFICIAL KNEE JOINT: ICD-10-CM
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
## 2024-06-05 DIAGNOSIS — S81.812D LACERATION WITHOUT FOREIGN BODY, LEFT LOWER LEG, SUBSEQUENT ENCOUNTER: ICD-10-CM
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
## 2024-06-05 DIAGNOSIS — Z80.1 FAMILY HISTORY OF MALIGNANT NEOPLASM OF TRACHEA, BRONCHUS AND LUNG: ICD-10-CM

## 2024-06-10 NOTE — REVIEW OF SYSTEMS
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

## 2024-06-10 NOTE — PLAN
[FreeTextEntry1] : left lateral lower leg wound  collagenase,DD,QD return 1 week 30 minutes spent in review, evaluation ,and treatment planning

## 2024-06-10 NOTE — ASSESSMENT
[FreeTextEntry1] : Pt. speaks Mandarin - Son translates information and explained to patient   [FreeTextEntry2] : Infection Prevention Wound care and Dressing changes Promote and Restore optimal skin integrity Nutrition and Wound Healing  Offloading and Pressure relief Protect and Guard wound site  Maintain acceptable levels of Pain Compliance R/T Elevation of Lower Extremities [FreeTextEntry3] : Initial  Visit [FreeTextEntry4] : MD assessed wound site - Ordered Collagenase. Spoke with patient and son about low HR - advised to call PCP today and let them know about HR. Patient denies dizziness at any time.  Pt. does not have a list of medications with him - Son will bring at next visit.  Pt. currently has an appointment next week with his PCP, but MD Nichole advised to call today or go to the ER.  Pt. does not want to go to the ER now, so son will call the PCP. Patient and son verbalized understanding of all discussed. Patient to return to [facility] in One week Supplies given,  task sent for same.

## 2024-06-10 NOTE — VITALS
[de-identified] : 0/10 [FreeTextEntry5] : Initial Visit - Medications unable to be reconciled - Pt. does not have his list of medications with him, but states that he takes Insulin for his diabetes.

## 2024-06-10 NOTE — PHYSICAL EXAM
[1+] : left 1+ [Alert] : alert [Oriented to Person] : oriented to person [Oriented to Place] : oriented to place [Oriented to Time] : oriented to time [Calm] : calm [Ankle Swelling (On Exam)] : not present [Varicose Veins Of Lower Extremities] : not present [] : not present [de-identified] : WD WN 88 Y/O  male in NAD [de-identified] : necrotic wound left lateral lower leg, No SOI [FreeTextEntry1] : Left Lateral Lower Leg [FreeTextEntry2] : 2.5 [FreeTextEntry3] : 2.2 [FreeTextEntry4] : 0.1 [de-identified] : Serous [de-identified] : 90% [de-identified] : 10% [de-identified] : Collagenase [de-identified] : Mechanically cleansed with 0.9% Normal Saline Cloth Tape [de-identified] : Right Dorsalis Pedis  +1     Left Dorsalis Pedis   +1 Right Posterior Tibialis  +1 Left Posterior Tibialis   +1   Doppler utilized;   Pulses present  - Right  Pulses present - Left  Extremity Temperature: Cool  to touch Extremity Color:  Normal  Capillary Refill:  3 Seconds.   No Vascular Studies ordered - Palpable pulses. [TWNoteComboBox4] : Small [TWNoteComboBox5] : No [TWNoteComboBox6] : Traumatic [de-identified] : No [de-identified] : Normal [de-identified] : None [de-identified] : None [de-identified] : Yes [de-identified] : Daily [de-identified] : Primary Dressing

## 2024-06-10 NOTE — REASON FOR VISIT
[FreeTextEntry1] : Initial [employer] Birth Control Pills Pregnancy And Lactation Text: This medication should be avoided if pregnant and for the first 30 days post-partum.

## 2024-06-10 NOTE — HISTORY OF PRESENT ILLNESS
[FreeTextEntry1] : MARTHA MELCHOR is being seen for a initial nursing assessment visit. Patient into [facility] for traumatic wound on the Left Lateral Lower Leg, present for 2 weeks. Pt. hit his leg on the bed frame. Pt. went to [facility] on 5/26/24 because it was not healing. ER prescribed 2 medications (One was an antibiotic, which he completed. The other the patient cannot recall what it was for), and referred pt. here for f/u. Denies pain at wound site, but reports "itchiness" Patient accompanied by Son. 6/3/24 patient has pulse of 43 but regular. Denies and symptoms related to this. Discussed with patients son to contact his PCP while the patient is still here at the wound care clinic to see if the PCP wanted to see the patient now or go to the ER. Patient refused to go to the ER

## 2024-06-11 ENCOUNTER — OUTPATIENT (OUTPATIENT)
Dept: OUTPATIENT SERVICES | Facility: HOSPITAL | Age: 89
LOS: 1 days | Discharge: HOME | End: 2024-06-11
Payer: COMMERCIAL

## 2024-06-11 ENCOUNTER — APPOINTMENT (OUTPATIENT)
Dept: WOUND CARE | Facility: HOSPITAL | Age: 89
End: 2024-06-11
Payer: MEDICARE

## 2024-06-11 VITALS
WEIGHT: 170 LBS | BODY MASS INDEX: 25.76 KG/M2 | RESPIRATION RATE: 18 BRPM | OXYGEN SATURATION: 96 % | DIASTOLIC BLOOD PRESSURE: 61 MMHG | SYSTOLIC BLOOD PRESSURE: 133 MMHG | TEMPERATURE: 97.9 F | HEART RATE: 61 BPM | HEIGHT: 68 IN

## 2024-06-11 DIAGNOSIS — E11.9 TYPE 2 DIABETES MELLITUS WITHOUT COMPLICATIONS: ICD-10-CM
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
## 2024-06-11 DIAGNOSIS — S81.812A LACERATION WITHOUT FOREIGN BODY, LEFT LOWER LEG, INITIAL ENCOUNTER: ICD-10-CM

## 2024-06-11 DIAGNOSIS — I10 ESSENTIAL (PRIMARY) HYPERTENSION: ICD-10-CM

## 2024-06-11 DIAGNOSIS — Z98.49 CATARACT EXTRACTION STATUS, UNSPECIFIED EYE: Chronic | ICD-10-CM

## 2024-06-11 DIAGNOSIS — Z96.652 PRESENCE OF LEFT ARTIFICIAL KNEE JOINT: ICD-10-CM

## 2024-06-11 DIAGNOSIS — Z80.1 FAMILY HISTORY OF MALIGNANT NEOPLASM OF TRACHEA, BRONCHUS AND LUNG: ICD-10-CM
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
## 2024-06-11 DIAGNOSIS — L97.801 NON-PRESSURE CHRONIC ULCER OF OTHER PART OF UNSPECIFIED LOWER LEG LIMITED TO BREAKDOWN OF SKIN: ICD-10-CM

## 2024-06-11 PROCEDURE — 99213 OFFICE O/P EST LOW 20 MIN: CPT

## 2024-06-11 PROCEDURE — 97602 WOUND(S) CARE NON-SELECTIVE: CPT

## 2024-06-20 ENCOUNTER — APPOINTMENT (OUTPATIENT)
Dept: WOUND CARE | Facility: HOSPITAL | Age: 89
End: 2024-06-20
Payer: MEDICARE

## 2024-06-20 ENCOUNTER — OUTPATIENT (OUTPATIENT)
Dept: OUTPATIENT SERVICES | Facility: HOSPITAL | Age: 89
LOS: 1 days | Discharge: HOME | End: 2024-06-20
Payer: COMMERCIAL

## 2024-06-20 VITALS
HEIGHT: 67 IN | BODY MASS INDEX: 26.68 KG/M2 | DIASTOLIC BLOOD PRESSURE: 65 MMHG | OXYGEN SATURATION: 94 % | HEART RATE: 66 BPM | SYSTOLIC BLOOD PRESSURE: 130 MMHG | TEMPERATURE: 97.8 F | RESPIRATION RATE: 14 BRPM | WEIGHT: 170 LBS

## 2024-06-20 DIAGNOSIS — Z98.49 CATARACT EXTRACTION STATUS, UNSPECIFIED EYE: Chronic | ICD-10-CM

## 2024-06-20 DIAGNOSIS — S81.812D LACERATION W/OUT FOREIGN BODY, LEFT LOWER LEG, SUBSEQUENT ENCOUNTER: ICD-10-CM

## 2024-06-20 DIAGNOSIS — L97.801 NON-PRESSURE CHRONIC ULCER OF OTHER PART OF UNSPECIFIED LOWER LEG LIMITED TO BREAKDOWN OF SKIN: ICD-10-CM

## 2024-06-20 PROCEDURE — 88304 TISSUE EXAM BY PATHOLOGIST: CPT

## 2024-06-20 PROCEDURE — 11042 DBRDMT SUBQ TIS 1ST 20SQCM/<: CPT

## 2024-06-20 PROCEDURE — 88304 TISSUE EXAM BY PATHOLOGIST: CPT | Mod: 26

## 2024-06-20 RX ORDER — NIFEDIPINE 30 MG/1
30 TABLET, EXTENDED RELEASE ORAL
Refills: 0 | Status: ACTIVE | COMMUNITY

## 2024-06-20 RX ORDER — DAPAGLIFLOZIN 10 MG/1
10 TABLET, FILM COATED ORAL
Refills: 0 | Status: ACTIVE | COMMUNITY

## 2024-06-24 NOTE — REVIEW OF SYSTEMS
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

## 2024-06-24 NOTE — PHYSICAL EXAM
[4 x 4] : 4 x 4  [1+] : left 1+ [Alert] : alert [Oriented to Person] : oriented to person [Oriented to Place] : oriented to place [Oriented to Time] : oriented to time [Calm] : calm [Ankle Swelling (On Exam)] : not present [Varicose Veins Of Lower Extremities] : not present [] : not present [de-identified] : WD WN 88 Y/O  male in NAD [de-identified] : necrotic wound left lateral lower leg, No SOI [FreeTextEntry1] : Left Lateral Lower Leg [FreeTextEntry2] : 2.0 [FreeTextEntry3] : 2.1 [FreeTextEntry4] : Eschar [de-identified] : Serous [de-identified] : post debridement 2.2 x 2.2 x 0.2 [de-identified] : Collagenase [de-identified] : Mechanically cleansed with 0.9% Normal Saline Cloth Tape [TWNoteComboBox4] : Small [TWNoteComboBox5] : No [TWNoteComboBox6] : Traumatic [de-identified] : No [de-identified] : Normal [de-identified] : None [de-identified] : 100% [de-identified] : None [de-identified] : Yes [TWNoteComboBox7] : Michelle [de-identified] : Debridement performed of all devitalized tissue to bleeding viable tissue [de-identified] : Daily [de-identified] : Primary Dressing

## 2024-06-24 NOTE — ASSESSMENT
[Verbal] : Verbal [Written] : Written [Demo] : Demo [Patient] : Patient [Family member] : Family member [Good - alert, interested, motivated] : Good - alert, interested, motivated [Verbalizes knowledge/Understanding] : Verbalizes knowledge/understanding [Dressing changes] : dressing changes [Skin Care] : skin care [Pressure relief] : pressure relief [Signs and symptoms of infection] : sign and symptoms of infection [Nutrition] : nutrition [How and When to Call] : how and when to call [Patient responsibility to plan of care] : patient responsibility to plan of care [Glycemic Control] : glycemic control [Stable] : stable [Home] : Home [Ambulatory] : Ambulatory [Not Applicable - Long Term Care/Home Health Agency] : Long Term Care/Home Health Agency: Not Applicable [Foot Care] : foot care [Pain Management] : pain management [Other: ____] : [unfilled] [] : No [FreeTextEntry1] : Pt. speaks Mandarin - Son translated information and explained to patient   [FreeTextEntry2] : Infection Prevention Pain management Wound care and Dressing changes Promote and Restore optimal skin integrity Nutrition and Wound Healing  Offloading and Pressure relief Protect and Guard wound site  Elevation of Lower Extremities Ambulation safety Regular f/u with primary medical team [FreeTextEntry4] : Sharp debridement performed today. Pathology specimen left lateral lower leg sent to the lab same date. Pt/ pts son were reminded to have the pt elevate his legs for edema reduction. Continue Collagenase daily. Supply request submitted today. Patient and son verbalized understanding of all discussed. F/U 1 week

## 2024-06-24 NOTE — HISTORY OF PRESENT ILLNESS
[FreeTextEntry1] : MARTHA MELCHOR is being seen for a initial nursing assessment visit. Patient into [facility] for traumatic wound on the Left Lateral Lower Leg, present for 2 weeks. Pt. hit his leg on the bed frame. Pt. went to [place] ER on 5/26/24 because it was not healing. ER prescribed 2 medications (One was an antibiotic, which he completed. The other the patient cannot recall what it was for), and referred pt. here for f/u. Denies pain at wound site, but reports "itchiness" Patient accompanied by Son. 6/3/24 patient has pulse of 43 but regular. Denies and symptoms related to this. Discussed with patients son to contact his PCP while the patient is still here at the wound care clinic to see if the PCP wanted to see the patient now or go to the ER. Patient refused to go to the ER 6/11/24 left lateral calf wound smaller. No SOI. HR 60

## 2024-06-24 NOTE — PROCEDURE
[Saline] : saline [Topical Lidocaine] : topical lidocaine  [Necrotic] : necrotic [Scalpel] : scalpel [Skin] : skin [Fat] : fat [Subcutaneous tissue] : subcutaneous tissue [Pathologic Exam] : pathologic exam [Clean] : clean [Pressure] : pressure [FreeTextEntry2] : left lateral calf [FreeTextEntry1] : collagenase,DD,QD [] : No [FreeTextEntry9] : 2871 [de-identified] : necrotic tissue left lateral calf [de-identified] : ronaldo zuniga [FreeTextEntry6] : wound left lower leg [FreeTextEntry7] : same [de-identified] : topical lidocaine [de-identified] : 1ml [de-identified] : yes

## 2024-06-25 DIAGNOSIS — E11.9 TYPE 2 DIABETES MELLITUS WITHOUT COMPLICATIONS: ICD-10-CM

## 2024-06-25 DIAGNOSIS — I10 ESSENTIAL (PRIMARY) HYPERTENSION: ICD-10-CM

## 2024-06-25 DIAGNOSIS — S81.812A LACERATION WITHOUT FOREIGN BODY, LEFT LOWER LEG, INITIAL ENCOUNTER: ICD-10-CM
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
## 2024-06-25 DIAGNOSIS — S88.112A COMPLETE TRAUMATIC AMPUTATION AT LEVEL BETWEEN KNEE AND ANKLE, LEFT LOWER LEG, INITIAL ENCOUNTER: ICD-10-CM

## 2024-06-25 DIAGNOSIS — Z96.652 PRESENCE OF LEFT ARTIFICIAL KNEE JOINT: ICD-10-CM

## 2024-06-26 ENCOUNTER — APPOINTMENT (OUTPATIENT)
Dept: WOUND CARE | Facility: HOSPITAL | Age: 89
End: 2024-06-26

## 2024-06-27 NOTE — HISTORY OF PRESENT ILLNESS
[FreeTextEntry1] : Patient into [facility] for traumatic wound on the Left Lateral Lower Leg, present for 2 weeks. Pt. hit his leg on the bed frame. Pt. went to [place] ER on 5/26/24 because it was not healing. ER prescribed 2 medications (One was an antibiotic, which he completed. The other the patient cannot recall what it was for), and referred pt. here for f/u. Denies pain at wound site, but reports "itchiness" Patient accompanied by Son. 6/3/24 patient has pulse of 43 but regular. Denies and symptoms related to this. Discussed with patients son to contact his PCP while the patient is still here at the wound care clinic to see if the PCP wanted to see the patient now or go to the ER. Patient refused to go to the ER 6/11/24 left lateral calf wound smaller. No SOI. HR 60

## 2024-06-27 NOTE — ASSESSMENT
[Verbal] : Verbal [Written] : Written [Demo] : Demo [Patient] : Patient [Family member] : Family member [Good - alert, interested, motivated] : Good - alert, interested, motivated [Verbalizes knowledge/Understanding] : Verbalizes knowledge/understanding [Dressing changes] : dressing changes [Skin Care] : skin care [Pressure relief] : pressure relief [Signs and symptoms of infection] : sign and symptoms of infection [Nutrition] : nutrition [How and When to Call] : how and when to call [Patient responsibility to plan of care] : patient responsibility to plan of care [Glycemic Control] : glycemic control [Stable] : stable [Home] : Home [Ambulatory] : Ambulatory [Not Applicable - Long Term Care/Home Health Agency] : Long Term Care/Home Health Agency: Not Applicable [] : No [FreeTextEntry1] : Pt. speaks Mandarin - Son translated information and explained to patient   [FreeTextEntry2] : Infection Prevention Wound care and Dressing changes Promote and Restore optimal skin integrity Nutrition and Wound Healing  Offloading and Pressure relief Protect and Guard wound site  Maintain acceptable levels of Pain Compliance R/T Elevation of Lower Extremities [FreeTextEntry4] : Pt. seen by his PCP 6/10/24 regarding bradycardia no interventions per pts son.  All vitals WNL this visit. Pts son brought medication bottles with him for medication reconciliation. Debridement completed this visit. Collagenase daily.  F/U 1 week

## 2024-06-27 NOTE — REVIEW OF SYSTEMS
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

## 2024-06-27 NOTE — PHYSICAL EXAM
[4 x 4] : 4 x 4  [1+] : left 1+ [Alert] : alert [Oriented to Person] : oriented to person [Oriented to Place] : oriented to place [Oriented to Time] : oriented to time [Calm] : calm [Ankle Swelling (On Exam)] : not present [Varicose Veins Of Lower Extremities] : not present [] : not present [de-identified] : WD WN 88 Y/O  male in NAD [de-identified] : necrotic wound left lateral lower leg, No SOI [FreeTextEntry1] : Left Lateral Lower Leg [FreeTextEntry2] : 2.0 [FreeTextEntry3] : 2.1 [FreeTextEntry4] : 0.1 [de-identified] : Serous [de-identified] : Collagenase [de-identified] : Mechanically cleansed with 0.9% Normal Saline Cloth Tape [TWNoteComboBox4] : Small [TWNoteComboBox5] : No [TWNoteComboBox6] : Traumatic [de-identified] : No [de-identified] : Normal [de-identified] : None [de-identified] : 100% [de-identified] : None [de-identified] : Yes [de-identified] : Daily [de-identified] : Primary Dressing

## 2024-06-27 NOTE — VITALS
[] : No [de-identified] : 0/10 [FreeTextEntry5] : Medication reconciliation completed with medication bottles provided by the pts son.

## 2024-06-27 NOTE — PLAN
[FreeTextEntry1] : left lateral lower leg wound  collagenase,SAUMYA,QD authorization for debridement return 1 week 20 minutes spent in review, evaluation ,and treatment planning

## 2024-08-29 ENCOUNTER — INPATIENT (INPATIENT)
Facility: HOSPITAL | Age: 89
LOS: 5 days | Discharge: HOME | DRG: 560 | End: 2024-09-04
Attending: HOSPITALIST | Admitting: INTERNAL MEDICINE
Payer: COMMERCIAL

## 2024-08-29 VITALS
HEART RATE: 85 BPM | SYSTOLIC BLOOD PRESSURE: 160 MMHG | WEIGHT: 154.98 LBS | OXYGEN SATURATION: 100 % | TEMPERATURE: 98 F | RESPIRATION RATE: 17 BRPM | HEIGHT: 67 IN | DIASTOLIC BLOOD PRESSURE: 76 MMHG

## 2024-08-29 DIAGNOSIS — M25.569 PAIN IN UNSPECIFIED KNEE: ICD-10-CM

## 2024-08-29 DIAGNOSIS — Z98.49 CATARACT EXTRACTION STATUS, UNSPECIFIED EYE: Chronic | ICD-10-CM

## 2024-08-29 LAB
ALBUMIN SERPL ELPH-MCNC: 3.5 G/DL — SIGNIFICANT CHANGE UP (ref 3.3–5)
ALP SERPL-CCNC: 118 U/L — SIGNIFICANT CHANGE UP (ref 40–120)
ALT FLD-CCNC: 28 U/L — SIGNIFICANT CHANGE UP (ref 12–78)
ANION GAP SERPL CALC-SCNC: 4 MMOL/L — LOW (ref 5–17)
APTT BLD: 30.6 SEC — SIGNIFICANT CHANGE UP (ref 24.5–35.6)
AST SERPL-CCNC: 19 U/L — SIGNIFICANT CHANGE UP (ref 15–37)
BASOPHILS # BLD AUTO: 0.02 K/UL — SIGNIFICANT CHANGE UP (ref 0–0.2)
BASOPHILS NFR BLD AUTO: 0.2 % — SIGNIFICANT CHANGE UP (ref 0–2)
BILIRUB SERPL-MCNC: 0.4 MG/DL — SIGNIFICANT CHANGE UP (ref 0.2–1.2)
BUN SERPL-MCNC: 29 MG/DL — HIGH (ref 7–23)
CALCIUM SERPL-MCNC: 9 MG/DL — SIGNIFICANT CHANGE UP (ref 8.5–10.1)
CHLORIDE SERPL-SCNC: 105 MMOL/L — SIGNIFICANT CHANGE UP (ref 96–108)
CO2 SERPL-SCNC: 29 MMOL/L — SIGNIFICANT CHANGE UP (ref 22–31)
CREAT SERPL-MCNC: 1.5 MG/DL — HIGH (ref 0.5–1.3)
EGFR: 44 ML/MIN/1.73M2 — LOW
EOSINOPHIL # BLD AUTO: 0.11 K/UL — SIGNIFICANT CHANGE UP (ref 0–0.5)
EOSINOPHIL NFR BLD AUTO: 1.2 % — SIGNIFICANT CHANGE UP (ref 0–6)
ERYTHROCYTE [SEDIMENTATION RATE] IN BLOOD: 37 MM/HR — HIGH (ref 0–20)
GLUCOSE SERPL-MCNC: 168 MG/DL — HIGH (ref 70–99)
HCT VFR BLD CALC: 39.7 % — SIGNIFICANT CHANGE UP (ref 39–50)
HGB BLD-MCNC: 12.4 G/DL — LOW (ref 13–17)
IMM GRANULOCYTES NFR BLD AUTO: 0.3 % — SIGNIFICANT CHANGE UP (ref 0–0.9)
INR BLD: 0.96 RATIO — SIGNIFICANT CHANGE UP (ref 0.85–1.18)
LACTATE SERPL-SCNC: 2.4 MMOL/L — HIGH (ref 0.7–2)
LYMPHOCYTES # BLD AUTO: 1.83 K/UL — SIGNIFICANT CHANGE UP (ref 1–3.3)
LYMPHOCYTES # BLD AUTO: 20.4 % — SIGNIFICANT CHANGE UP (ref 13–44)
MCHC RBC-ENTMCNC: 31.2 GM/DL — LOW (ref 32–36)
MCHC RBC-ENTMCNC: 32.5 PG — SIGNIFICANT CHANGE UP (ref 27–34)
MCV RBC AUTO: 103.9 FL — HIGH (ref 80–100)
MONOCYTES # BLD AUTO: 0.75 K/UL — SIGNIFICANT CHANGE UP (ref 0–0.9)
MONOCYTES NFR BLD AUTO: 8.3 % — SIGNIFICANT CHANGE UP (ref 2–14)
NEUTROPHILS # BLD AUTO: 6.25 K/UL — SIGNIFICANT CHANGE UP (ref 1.8–7.4)
NEUTROPHILS NFR BLD AUTO: 69.6 % — SIGNIFICANT CHANGE UP (ref 43–77)
NRBC # BLD: 0 /100 WBCS — SIGNIFICANT CHANGE UP (ref 0–0)
PLATELET # BLD AUTO: 184 K/UL — SIGNIFICANT CHANGE UP (ref 150–400)
POTASSIUM SERPL-MCNC: 3.9 MMOL/L — SIGNIFICANT CHANGE UP (ref 3.5–5.3)
POTASSIUM SERPL-SCNC: 3.9 MMOL/L — SIGNIFICANT CHANGE UP (ref 3.5–5.3)
PROT SERPL-MCNC: 7.7 G/DL — SIGNIFICANT CHANGE UP (ref 6–8.3)
PROTHROM AB SERPL-ACNC: 11 SEC — SIGNIFICANT CHANGE UP (ref 9.5–13)
RBC # BLD: 3.82 M/UL — LOW (ref 4.2–5.8)
RBC # FLD: 13.6 % — SIGNIFICANT CHANGE UP (ref 10.3–14.5)
SODIUM SERPL-SCNC: 138 MMOL/L — SIGNIFICANT CHANGE UP (ref 135–145)
WBC # BLD: 8.99 K/UL — SIGNIFICANT CHANGE UP (ref 3.8–10.5)
WBC # FLD AUTO: 8.99 K/UL — SIGNIFICANT CHANGE UP (ref 3.8–10.5)

## 2024-08-29 PROCEDURE — 99222 1ST HOSP IP/OBS MODERATE 55: CPT

## 2024-08-29 PROCEDURE — 99285 EMERGENCY DEPT VISIT HI MDM: CPT

## 2024-08-29 PROCEDURE — 73562 X-RAY EXAM OF KNEE 3: CPT | Mod: 26,LT

## 2024-08-29 PROCEDURE — 93010 ELECTROCARDIOGRAM REPORT: CPT

## 2024-08-29 RX ORDER — ACETAMINOPHEN 325 MG/1
975 TABLET ORAL ONCE
Refills: 0 | Status: COMPLETED | OUTPATIENT
Start: 2024-08-29 | End: 2024-08-29

## 2024-08-29 RX ORDER — VANCOMYCIN/0.9 % SOD CHLORIDE 1.75G/25
1000 PLASTIC BAG, INJECTION (ML) INTRAVENOUS ONCE
Refills: 0 | Status: COMPLETED | OUTPATIENT
Start: 2024-08-29 | End: 2024-08-29

## 2024-08-29 RX ORDER — SODIUM CHLORIDE 9 MG/ML
1000 INJECTION INTRAMUSCULAR; INTRAVENOUS; SUBCUTANEOUS ONCE
Refills: 0 | Status: COMPLETED | OUTPATIENT
Start: 2024-08-29 | End: 2024-08-29

## 2024-08-29 RX ORDER — PIPERACILLIN SODIUM AND TAZOBACTAM SODIUM 3; .375 G/15ML; G/15ML
3.38 INJECTION, POWDER, FOR SOLUTION INTRAVENOUS ONCE
Refills: 0 | Status: COMPLETED | OUTPATIENT
Start: 2024-08-29 | End: 2024-08-29

## 2024-08-29 RX ADMIN — PIPERACILLIN SODIUM AND TAZOBACTAM SODIUM 200 GRAM(S): 3; .375 INJECTION, POWDER, FOR SOLUTION INTRAVENOUS at 21:24

## 2024-08-29 RX ADMIN — SODIUM CHLORIDE 1000 MILLILITER(S): 9 INJECTION INTRAMUSCULAR; INTRAVENOUS; SUBCUTANEOUS at 21:07

## 2024-08-29 RX ADMIN — SODIUM CHLORIDE 1000 MILLILITER(S): 9 INJECTION INTRAMUSCULAR; INTRAVENOUS; SUBCUTANEOUS at 23:02

## 2024-08-29 RX ADMIN — Medication 250 MILLIGRAM(S): at 22:58

## 2024-08-29 NOTE — ED ADULT NURSE NOTE - OBJECTIVE STATEMENT
89 y m presents to ED  c/o left knee pain and swelling that began yesterday morning. Patient had knee replacement surgery 6 years ago at [facility]. Patient states pain is present with ambulation 89 y m presents to ED  c/o left knee pain and swelling that began yesterday morning. Patient left had knee replacement surgery 6 years ago at [facility]. Patient states pain is present with ambulation

## 2024-08-29 NOTE — ED ADULT NURSE NOTE - CHIEF COMPLAINT QUOTE
pt to triage aox4 c/o pain and swelling to L knee that began yesterday morning. pt reports having knee replacement surgery 6 years ago @ [facility]. denies trauma.

## 2024-08-29 NOTE — ED ADULT NURSE REASSESSMENT NOTE - NS ED NURSE REASSESS COMMENT FT1
patient received alert and orientedx4, resting comfortably on stretcher, breathing normally. Patients son at bedside with patient. Patient is Mandarin speaking, son at bedside able to translate for patient. All medications tolerated per MD order. Patient awaiting bed at this time. Repeat lactate obtained and sent to Lab. Fall precautions maintained. Call light in reach. Hourly rounding maintained. All patient safety maintained

## 2024-08-29 NOTE — ED ADULT NURSE NOTE - ED STAT RN HANDOFF DETAILS 2
pt tolerated procedure well. dressing dry and intact report given to Carlyn BATISTA consent obtained from son secondary to pt inablility to hear  Wing [id number]

## 2024-08-29 NOTE — H&P ADULT - HISTORY OF PRESENT ILLNESS
89 Y.O.M with PMH of DM tytpe 2 insulin dependent , HTN, HLD, BPH and gout   presented to ED due to L knee pain that started about 2 days ago and noticed that it was getting swollen and warm to touch   so he decided to come to ED  patient of note pt had traumatic LLE calf area on may 2024 wound that was not healing , pt had debridement with Dr. zuniga and was following in wound clinic , currently completely healed   patient denied any fever , chills , nausea , vomiting , abdominal pain , chest pain or SOB

## 2024-08-29 NOTE — ED ADULT TRIAGE NOTE - CHIEF COMPLAINT QUOTE
pt to triage aox4 c/o pain and swelling to L knee that began yesterday morning. pt reports having knee replacement surgery 6 years ago @ [facility]. denies trauma.

## 2024-08-29 NOTE — ED PROVIDER NOTE - PHYSICAL EXAMINATION
Obesity   AMBULATORY CARE:   [employer]  or [facility] Shannan Casey   The above information is an  only  It is not intended as medical advice for individual conditions or treatments  Talk to your doctor, nurse or pharmacist before following any medical regimen to see if it is safe and effective for you  Constitutional: Awake, Alert, non-toxic. NAD. Well appearing, well nourished.   HEAD: Normocephalic, atraumatic.   EYES: EOM intact, conjunctiva and sclera are clear bilaterally.   ENT: No rhinorrhea, patent, mucous membranes pink/moist, no drooling or stridor.   NECK: Supple, non-tender  CARDIOVASCULAR: Normal S1, S2; regular rate and rhythm.  RESPIRATORY: Normal respiratory effort; breath sounds CTAB, no wheezes, rhonchi, or rales. Speaking in full sentences. No accessory muscle use.   EXTREMITIES: Full passive and active ROM in all extremities; (+) left knee swelling and erythema, FROM knee, (+) left knee non-tender to palpation; distal pulses palpable and symmetric  SKIN: Warm, dry; good skin turgor, no apparent lesions or rashes, no ecchymosis, brisk capillary refill.  NEURO: A&O x3. Sensory and motor functions are grossly intact. Speech is normal. Appearance and judgement seem appropriate for gender and age.

## 2024-08-29 NOTE — CONSULT NOTE ADULT - SUBJECTIVE AND OBJECTIVE BOX
HPI:  This is a 89 y/o M seen in the ED for left knee pain x2 days. Hx of DM, HTN, left TKA by Dr. Nguyen 2017.     PAST MEDICAL HISTORY:  PAST MEDICAL & SURGICAL HISTORY:  HTN (hypertension)      DM2 (diabetes mellitus, type 2)      BPH (benign prostatic hyperplasia)      Prostate CA  Radiation seed Implant      HLD (hyperlipidemia)      Gout      History of gastroesophageal reflux (GERD)      Shingles  8 years ago      S/P cataract surgery  right eye          PAST SURGICAL HISTORY:    REVIEW OF SYSTEMS:  General/Constitutional: No acute distress, no headache, weakness, fevers, or chills   HEENT: Denies auditory or visual changes/disturbances, no vertigo, no throat pain, no dysphagia    Neck: Denies neck pain/stiffness, denies swelling/lumps/hoarseness   Lymphatic: Denies lumps or swelling in the axillae, groin, or neck bilaterally   Respiratory: Denies cough/hemoptysis, denies wheezing/SOB/dyspnea  Cardiac: Denies chest pain, palpitations  Abdomen: Denies abdominal bloating/fullness, nausea or vomiting, denies abdominal pain  Genitourinary: Denies urinary issues or complaints, denies dysuria/hematuria  Neuro: Denies weakness, paraesthesias, paralysis, syncope.  Skin: Denies pruritus, pain, rashes  Psych: Denies hallucinations, visual disturbances, or depression    MEDICATIONS:  Home Medications:  allopurinol 100 mg oral tablet: 1 tab(s) orally once a day (15 Jul 2019 07:33)  atenolol 50 mg oral tablet: 1 tab(s) orally once a day (15 Jul 2019 07:33)  atorvastatin 10 mg oral tablet: 1 tab(s) orally once a day (15 Jul 2019 07:33)  Januvia 50 mg oral tablet: 1 tab(s) orally once a day (15 Jul 2019 07:33)  losartan 50 mg oral tablet: 1 tab(s) orally once a day (15 Jul 2019 07:33)  omeprazole 40 mg oral delayed release capsule: 1 cap(s) orally once a day (15 Jul 2019 07:33)  Vitamin B-12 1000 mcg oral tablet: 1 tab(s) orally once a day (15 Jul 2019 07:33)  Vitamin D2 50,000 intl units (1.25 mg) oral capsule: 1 cap(s) orally once a week (15 Jul 2019 07:33)    MEDICATIONS  (STANDING):  acetaminophen     Tablet .. 975 milliGRAM(s) Oral once  vancomycin  IVPB. 1000 milliGRAM(s) IV Intermittent once    MEDICATIONS  (PRN):      ALLERGIES:  Allergies    No Known Allergies    Intolerances        SOCIAL HISTORY:  Social History:    Smoking: Yes [ ]  No [X]   ______pk yrs  ETOH  Yes [ ]  No [X]  Social [ ]  DRUGS:  Yes [ ]  No [X]  if so what______________    FAMILY HISTORY:  FAMILY HISTORY:  FHx: lung cancer        VITAL SIGNS:  Vital Signs Last 24 Hrs  T(C): 36.7 (29 Aug 2024 19:23), Max: 36.7 (29 Aug 2024 19:23)  T(F): 98.1 (29 Aug 2024 19:23), Max: 98.1 (29 Aug 2024 19:23)  HR: 85 (29 Aug 2024 19:23) (85 - 85)  BP: 160/76 (29 Aug 2024 19:23) (160/76 - 160/76)  BP(mean): --  RR: 17 (29 Aug 2024 19:23) (17 - 17)  SpO2: 100% (29 Aug 2024 19:23) (100% - 100%)    Parameters below as of 29 Aug 2024 19:23  Patient On (Oxygen Delivery Method): room air        PHYSICAL EXAM:  AAO x3. No acute distress, appears comfortable, well-groomed, appears stated age  Palpable pedal pulses bilaterally  Sensory intact throughout bilat LE's  Bilat LE motor intact, 5/5 strength  mild swelling left knee  mild erythema left knee    RADIOLOGIC STUDIES:  AP/Lat left knee shows left TKA in good position.     ASSESSMENT: left knee pain s/p left TKA in 2017    PLAN:  - Admit to medicine  - f/u labs (ESR / CRP)  -Vanco/Zosyn started in ED  - WB as tolerated  -Discussed with Dr. Wick who is aware and approves of plan.  HPI:  This is a 89 y/o M seen in the ED for left knee pain x2 days. Hx of DM, HTN, left TKA by Dr. Nguyen 2017.     PAST MEDICAL HISTORY:  PAST MEDICAL & SURGICAL HISTORY:  HTN (hypertension)      DM2 (diabetes mellitus, type 2)      BPH (benign prostatic hyperplasia)      Prostate CA  Radiation seed Implant      HLD (hyperlipidemia)      Gout      History of gastroesophageal reflux (GERD)      Shingles  8 years ago      S/P cataract surgery  right eye          PAST SURGICAL HISTORY:    REVIEW OF SYSTEMS:  General/Constitutional: No acute distress, no headache, weakness, fevers, or chills   HEENT: Denies auditory or visual changes/disturbances, no vertigo, no throat pain, no dysphagia    Neck: Denies neck pain/stiffness, denies swelling/lumps/hoarseness   Lymphatic: Denies lumps or swelling in the axillae, groin, or neck bilaterally   Respiratory: Denies cough/hemoptysis, denies wheezing/SOB/dyspnea  Cardiac: Denies chest pain, palpitations  Abdomen: Denies abdominal bloating/fullness, nausea or vomiting, denies abdominal pain  Genitourinary: Denies urinary issues or complaints, denies dysuria/hematuria  Neuro: Denies weakness, paraesthesias, paralysis, syncope.  Skin: Denies pruritus, pain, rashes  Psych: Denies hallucinations, visual disturbances, or depression    MEDICATIONS:  Home Medications:  allopurinol 100 mg oral tablet: 1 tab(s) orally once a day (15 Jul 2019 07:33)  atenolol 50 mg oral tablet: 1 tab(s) orally once a day (15 Jul 2019 07:33)  atorvastatin 10 mg oral tablet: 1 tab(s) orally once a day (15 Jul 2019 07:33)  Januvia 50 mg oral tablet: 1 tab(s) orally once a day (15 Jul 2019 07:33)  losartan 50 mg oral tablet: 1 tab(s) orally once a day (15 Jul 2019 07:33)  omeprazole 40 mg oral delayed release capsule: 1 cap(s) orally once a day (15 Jul 2019 07:33)  Vitamin B-12 1000 mcg oral tablet: 1 tab(s) orally once a day (15 Jul 2019 07:33)  Vitamin D2 50,000 intl units (1.25 mg) oral capsule: 1 cap(s) orally once a week (15 Jul 2019 07:33)    MEDICATIONS  (STANDING):  acetaminophen     Tablet .. 975 milliGRAM(s) Oral once  vancomycin  IVPB. 1000 milliGRAM(s) IV Intermittent once    MEDICATIONS  (PRN):      ALLERGIES:  Allergies    No Known Allergies    Intolerances        SOCIAL HISTORY:  Social History:    Smoking: Yes [ ]  No [X]   ______pk yrs  ETOH  Yes [ ]  No [X]  Social [ ]  DRUGS:  Yes [ ]  No [X]  if so what______________    FAMILY HISTORY:  FAMILY HISTORY:  FHx: lung cancer        VITAL SIGNS:  Vital Signs Last 24 Hrs  T(C): 36.7 (29 Aug 2024 19:23), Max: 36.7 (29 Aug 2024 19:23)  T(F): 98.1 (29 Aug 2024 19:23), Max: 98.1 (29 Aug 2024 19:23)  HR: 85 (29 Aug 2024 19:23) (85 - 85)  BP: 160/76 (29 Aug 2024 19:23) (160/76 - 160/76)  BP(mean): --  RR: 17 (29 Aug 2024 19:23) (17 - 17)  SpO2: 100% (29 Aug 2024 19:23) (100% - 100%)    Parameters below as of 29 Aug 2024 19:23  Patient On (Oxygen Delivery Method): room air        PHYSICAL EXAM:  AAO x3. No acute distress, appears comfortable, well-groomed, appears stated age  Palpable pedal pulses bilaterally  Sensory intact throughout bilat LE's  Bilat LE motor intact, 5/5 strength  no swelling  mild erythema to anterior medial aspect  rom 0-90 with pain    RADIOLOGIC STUDIES:  AP/Lat left knee shows left TKA in good position.     ASSESSMENT: left knee pain s/p left TKA in 2017    PLAN:  -left knee aspiration--> 10cc straw-colored fluid sent to lab  - f/u gram stain and culture  - Admit to medicine  - f/u labs (ESR / CRP)  -Vanco/Zosyn started in ED  - WB as tolerated  -Discussed with Dr. Wick who is aware and approves of plan.

## 2024-08-29 NOTE — ED PROVIDER NOTE - DIFFERENTIAL DIAGNOSIS
Differential Diagnosis Patient presenting to the emergency room with knee pain concern for cellulitis.  Given history of total knee replacement increased concern must be given.  Will obtain screening septic workup x-ray imaging of the knee consult with orthopedics and begin antibiotics as needed.  Patient will require admission for further workup and evaluation.

## 2024-08-29 NOTE — ED PROVIDER NOTE - WHICH SHOWED
Independent review of left knee x-ray reveals hardware in good alignment no acute fracture. Independent review of EKG reveals a normal sinus rhythm at 91 bpm

## 2024-08-29 NOTE — ED ADULT NURSE NOTE - NSFALLHARMRISKINTERV_ED_ALL_ED
Assistance OOB with selected safe patient handling equipment if applicable/Assistance with ambulation/Communicate risk of Fall with Harm to all staff, patient, and family/Monitor gait and stability/Provide patient with walking aids/Provide visual cue: red socks, yellow wristband, yellow gown, etc/Reinforce activity limits and safety measures with patient and family/Use of alarms - bed, stretcher, chair and/or video monitoring/Bed in lowest position, wheels locked, appropriate side rails in place/Call bell, personal items and telephone in reach/Instruct patient to call for assistance before getting out of bed/chair/stretcher/Non-slip footwear applied when patient is off stretcher/[place] to call system/Physically safe environment - no spills, clutter or unnecessary equipment/Purposeful Proactive Rounding/Room/bathroom lighting operational, light cord in reach

## 2024-08-29 NOTE — ED PROVIDER NOTE - OBJECTIVE STATEMENT
89-year-old male with past medical history of hypertension diabetes presents today due to left knee pain for 2 days.  Patient describes pain as aching, non-radiating, and currently 6 out of 10.  Patient did not take any pain meds.  Patient did have left knee replacement approximately 6 to 7 years ago.  Denies injury, numbness, weakness, fever, vomiting, chest pain, shortness of breath, or any other complaints.

## 2024-08-29 NOTE — ED PROVIDER NOTE - CLINICAL SUMMARY MEDICAL DECISION MAKING FREE TEXT BOX
Independent review of EKG reveals a normal sinus rhythm at 91 bpm Patient is an 89-year-old male who presents to the emergency with chief complaint of knee pain.  Past medical history of hypertension and diabetes.  Reports for the last 2 days has been experiencing worsening left knee pain no difficulty ambulating.  Has had prior left knee replacement 6-7 years ago.  Also does note a recent wound infection to the left shin treated with a course of antibiotics.  Denies fever nausea vomiting chest pain shortness of breath or abdominal pain.  On exam patient is lying in bed no acute distress normocephalic atraumatic pupils equal round and reactive heart regular rate lungs clear to auscultation abdomen soft nontender nondistended.  Left knee: There is swelling noted distal to the knee joint with increased warmth and erythema no palpable abscess noted no significant joint effusion noted.  There is tenderness overlying this region.  Patient able to fully extend but does report significant pain on flexion and is unable to fully flex the knee.  No tenderness to palpation to the anterior tibia, ankle or foot.  Positive pedal pulse cap refill less than 2 seconds sensation grossly intact.  Patient presenting to the emergency room with pain concern for cellulitis.  Given history of total knee replacement increased concern must be given.  Will obtain screening septic workup x-ray imaging of the knee consult orthopedics and begin antibiotics as needed.  Patient will require admission for further workup and evaluation.  Independent review of left knee x-ray reveals hardware in good alignment no acute fracture. Independent review of EKG reveals a normal sinus rhythm at 91 bpm

## 2024-08-29 NOTE — H&P ADULT - ASSESSMENT
A/P    # LLE cellulitis with edema below knee to r/o infectious collection   #HTN/HLD  # DM  # BPH   # gout     DVT ppx: heparin SQ  full code   diet : soft bote size carb consistent    A/P    # LLE cellulitis with edema below knee to r/o infectious collection   - CT LLE , f/u result   - f/u blood cx   - orthopedics obtained aspiration around 1 am , f/u result   -f/u blood and aspiration culture   - trend for fever and leukocytosis   - trend ESR and CRP  - s/p vanco and Zosyn in ED , given CKD changed Zosyn to meropenem , renally dose   -ortho consulted , to f/u further recommendations   - ID consult     # lactic acidosis , resolved   s/p 2L NS in ED    #HTN/HLD  - c/w home nifedipine   - c/w home losartan and if cr worsen to consider changing it   - c/w home atorvastatin     # CKD   baseline per previous labs from may 2024 , serum cr was 1.6   monitor serum cr     # DM  - home meds :on insulin lantus 12-16 , januvia and pioglitazone   - hold all home meds   = start lantus 5 U at bedtime , inuslin low SS  - POCT fingersticks pre meals and at bedtime , adjust insulin dose according to BG fingersticks , target BG level during admission 140-180       # BPH     c/w home flomax     # gout   c/w home allopurinol     DVT ppx: heparin SQ  full code   diet : soft bote size carb consistent

## 2024-08-29 NOTE — H&P ADULT - NSHPPHYSICALEXAM_GEN_ALL_CORE
GEN: elderly, NAD  HEENT: NC/AT, PERRL, no JVD, no icterus   lungs: equal air entry b/l, no wheezing or Rhonchi  heart: s1/s2 present ,no murmur , rubs or gallops  abd: soft , not tender or distended , BS+  ext: LLE below knee erythema , edema and swelling with fluctuance on exam , no discharge , DP Is 2+ , no cyanosis , healed L calf previous wound , RLE within normal   scar of TKA  healed well   neuro: AOx3, no focal deficit

## 2024-08-29 NOTE — ED ADULT NURSE NOTE - AVIAN FLU SYMPTOMS
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 MA to call pt for apt.   No

## 2024-08-29 NOTE — ED ADULT NURSE REASSESSMENT NOTE - NSFALLHARMRISKINTERV_ED_ALL_ED
Assistance OOB with selected safe patient handling equipment if applicable/Assistance with ambulation/Communicate risk of Fall with Harm to all staff, patient, and family/Monitor gait and stability/Provide visual cue: red socks, yellow wristband, yellow gown, etc/Reinforce activity limits and safety measures with patient and family/Use of alarms - bed, stretcher, chair and/or video monitoring/Bed in lowest position, wheels locked, appropriate side rails in place/Call bell, personal items and telephone in reach/Instruct patient to call for assistance before getting out of bed/chair/stretcher/Non-slip footwear applied when patient is off stretcher/[place] to call system/Physically safe environment - no spills, clutter or unnecessary equipment/Purposeful Proactive Rounding/Room/bathroom lighting operational, light cord in reach

## 2024-08-30 LAB
ALBUMIN SERPL ELPH-MCNC: 2.8 G/DL — LOW (ref 3.3–5)
ALP SERPL-CCNC: 84 U/L — SIGNIFICANT CHANGE UP (ref 40–120)
ALT FLD-CCNC: 22 U/L — SIGNIFICANT CHANGE UP (ref 12–78)
ANION GAP SERPL CALC-SCNC: 5 MMOL/L — SIGNIFICANT CHANGE UP (ref 5–17)
AST SERPL-CCNC: 14 U/L — LOW (ref 15–37)
B PERT IGG+IGM PNL SER: ABNORMAL
BILIRUB SERPL-MCNC: 0.5 MG/DL — SIGNIFICANT CHANGE UP (ref 0.2–1.2)
BUN SERPL-MCNC: 19 MG/DL — SIGNIFICANT CHANGE UP (ref 7–23)
CALCIUM SERPL-MCNC: 8.5 MG/DL — SIGNIFICANT CHANGE UP (ref 8.5–10.1)
CHLORIDE SERPL-SCNC: 109 MMOL/L — HIGH (ref 96–108)
CO2 SERPL-SCNC: 27 MMOL/L — SIGNIFICANT CHANGE UP (ref 22–31)
COLOR FLD: SIGNIFICANT CHANGE UP
CREAT SERPL-MCNC: 1.1 MG/DL — SIGNIFICANT CHANGE UP (ref 0.5–1.3)
CRP SERPL-MCNC: 12 MG/L — HIGH
CRP SERPL-MCNC: 17 MG/L — HIGH
EGFR: 65 ML/MIN/1.73M2 — SIGNIFICANT CHANGE UP
EOSINOPHIL # FLD: 0 % — SIGNIFICANT CHANGE UP
ERYTHROCYTE [SEDIMENTATION RATE] IN BLOOD: 31 MM/HR — HIGH (ref 0–20)
FLUID INTAKE SUBSTANCE CLASS: SIGNIFICANT CHANGE UP
FOLATE+VIT B12 SERBLD-IMP: 0 % — SIGNIFICANT CHANGE UP
GLUCOSE SERPL-MCNC: 125 MG/DL — HIGH (ref 70–99)
HCT VFR BLD CALC: 33.6 % — LOW (ref 39–50)
HGB BLD-MCNC: 11.5 G/DL — LOW (ref 13–17)
LACTATE SERPL-SCNC: 1.3 MMOL/L — SIGNIFICANT CHANGE UP (ref 0.7–2)
LDH SERPL L TO P-CCNC: 219 U/L — SIGNIFICANT CHANGE UP (ref 50–242)
LYMPHOCYTES # FLD: 10 % — SIGNIFICANT CHANGE UP
MCHC RBC-ENTMCNC: 33.9 PG — SIGNIFICANT CHANGE UP (ref 27–34)
MCHC RBC-ENTMCNC: 34.2 GM/DL — SIGNIFICANT CHANGE UP (ref 32–36)
MCV RBC AUTO: 99.1 FL — SIGNIFICANT CHANGE UP (ref 80–100)
MESOTHL CELL # FLD: 0 % — SIGNIFICANT CHANGE UP
MONOS+MACROS # FLD: 9 % — SIGNIFICANT CHANGE UP
NEUTROPHILS-BODY FLUID: 81 % — SIGNIFICANT CHANGE UP
NRBC # BLD: 0 /100 WBCS — SIGNIFICANT CHANGE UP (ref 0–0)
NRBC # FLD: 0 % — SIGNIFICANT CHANGE UP
OTHER CELLS FLD MANUAL: 0 % — SIGNIFICANT CHANGE UP
PLATELET # BLD AUTO: 159 K/UL — SIGNIFICANT CHANGE UP (ref 150–400)
POTASSIUM SERPL-MCNC: 3.7 MMOL/L — SIGNIFICANT CHANGE UP (ref 3.5–5.3)
POTASSIUM SERPL-SCNC: 3.7 MMOL/L — SIGNIFICANT CHANGE UP (ref 3.5–5.3)
PROT SERPL-MCNC: 6.3 G/DL — SIGNIFICANT CHANGE UP (ref 6–8.3)
RBC # BLD: 3.39 M/UL — LOW (ref 4.2–5.8)
RBC # FLD: 13.2 % — SIGNIFICANT CHANGE UP (ref 10.3–14.5)
RCV VOL RI: HIGH /UL (ref 0–0)
SODIUM SERPL-SCNC: 141 MMOL/L — SIGNIFICANT CHANGE UP (ref 135–145)
SYNOVIAL CRYSTALS CLARITY: ABNORMAL
SYNOVIAL CRYSTALS COLOR: ABNORMAL
SYNOVIAL CRYSTALS ID: ABNORMAL
SYNOVIAL CRYSTALS TUBE: SIGNIFICANT CHANGE UP
TOTAL NUCLEATED CELL COUNT, BODY FLUID: 427 /UL — SIGNIFICANT CHANGE UP
TUBE TYPE: SIGNIFICANT CHANGE UP
WBC # BLD: 8.52 K/UL — SIGNIFICANT CHANGE UP (ref 3.8–10.5)
WBC # FLD AUTO: 8.52 K/UL — SIGNIFICANT CHANGE UP (ref 3.8–10.5)

## 2024-08-30 PROCEDURE — 99223 1ST HOSP IP/OBS HIGH 75: CPT | Mod: GC

## 2024-08-30 PROCEDURE — 73700 CT LOWER EXTREMITY W/O DYE: CPT | Mod: 26,LT

## 2024-08-30 PROCEDURE — 99233 SBSQ HOSP IP/OBS HIGH 50: CPT

## 2024-08-30 RX ORDER — LOSARTAN POTASSIUM 50 MG/1
100 TABLET ORAL DAILY
Refills: 0 | Status: DISCONTINUED | OUTPATIENT
Start: 2024-08-30 | End: 2024-09-04

## 2024-08-30 RX ORDER — PIPERACILLIN SODIUM AND TAZOBACTAM SODIUM 3; .375 G/15ML; G/15ML
3.38 INJECTION, POWDER, FOR SOLUTION INTRAVENOUS EVERY 8 HOURS
Refills: 0 | Status: DISCONTINUED | OUTPATIENT
Start: 2024-08-30 | End: 2024-08-30

## 2024-08-30 RX ORDER — DEXTROSE 15 G/33 G
15 GEL IN PACKET (GRAM) ORAL ONCE
Refills: 0 | Status: DISCONTINUED | OUTPATIENT
Start: 2024-08-30 | End: 2024-09-04

## 2024-08-30 RX ORDER — HEPARIN SODIUM,BOVINE 1000/ML
5000 VIAL (ML) INJECTION EVERY 8 HOURS
Refills: 0 | Status: DISCONTINUED | OUTPATIENT
Start: 2024-08-30 | End: 2024-09-04

## 2024-08-30 RX ORDER — INSULIN GLARGINE 100 [IU]/ML
5 INJECTION, SOLUTION SUBCUTANEOUS AT BEDTIME
Refills: 0 | Status: DISCONTINUED | OUTPATIENT
Start: 2024-08-30 | End: 2024-08-31

## 2024-08-30 RX ORDER — TAMSULOSIN HYDROCHLORIDE 0.4 MG/1
0.4 CAPSULE ORAL AT BEDTIME
Refills: 0 | Status: DISCONTINUED | OUTPATIENT
Start: 2024-08-30 | End: 2024-09-04

## 2024-08-30 RX ORDER — DEXTROSE 15 G/33 G
25 GEL IN PACKET (GRAM) ORAL ONCE
Refills: 0 | Status: DISCONTINUED | OUTPATIENT
Start: 2024-08-30 | End: 2024-09-04

## 2024-08-30 RX ORDER — DEXTROSE 15 G/33 G
12.5 GEL IN PACKET (GRAM) ORAL ONCE
Refills: 0 | Status: DISCONTINUED | OUTPATIENT
Start: 2024-08-30 | End: 2024-09-04

## 2024-08-30 RX ORDER — MEROPENEM 500 MG/10ML
1000 INJECTION, POWDER, FOR SOLUTION INTRAVENOUS EVERY 12 HOURS
Refills: 0 | Status: DISCONTINUED | OUTPATIENT
Start: 2024-08-30 | End: 2024-08-30

## 2024-08-30 RX ORDER — TAMSULOSIN HYDROCHLORIDE 0.4 MG/1
1 CAPSULE ORAL
Refills: 0 | DISCHARGE

## 2024-08-30 RX ORDER — VANCOMYCIN/0.9 % SOD CHLORIDE 1.75G/25
1000 PLASTIC BAG, INJECTION (ML) INTRAVENOUS EVERY 24 HOURS
Refills: 0 | Status: DISCONTINUED | OUTPATIENT
Start: 2024-08-30 | End: 2024-09-02

## 2024-08-30 RX ORDER — PIOGLITAZONE 30 MG/1
1 TABLET ORAL
Refills: 0 | DISCHARGE

## 2024-08-30 RX ORDER — ASPIRIN 81 MG
81 TABLET, DELAYED RELEASE (ENTERIC COATED) ORAL DAILY
Refills: 0 | Status: DISCONTINUED | OUTPATIENT
Start: 2024-08-30 | End: 2024-09-04

## 2024-08-30 RX ORDER — NIFEDIPINE 60 MG/1
60 TABLET, FILM COATED, EXTENDED RELEASE ORAL DAILY
Refills: 0 | Status: DISCONTINUED | OUTPATIENT
Start: 2024-08-30 | End: 2024-09-04

## 2024-08-30 RX ORDER — ALLOPURINOL 300 MG
100 TABLET ORAL DAILY
Refills: 0 | Status: DISCONTINUED | OUTPATIENT
Start: 2024-08-30 | End: 2024-09-04

## 2024-08-30 RX ORDER — HEPARIN SODIUM,BOVINE 1000/ML
5000 VIAL (ML) INJECTION EVERY 8 HOURS
Refills: 0 | Status: DISCONTINUED | OUTPATIENT
Start: 2024-08-30 | End: 2024-08-30

## 2024-08-30 RX ORDER — NIFEDIPINE 60 MG/1
1 TABLET, FILM COATED, EXTENDED RELEASE ORAL
Refills: 0 | DISCHARGE

## 2024-08-30 RX ORDER — LOSARTAN POTASSIUM 50 MG/1
1 TABLET ORAL
Refills: 0 | DISCHARGE

## 2024-08-30 RX ORDER — CEFEPIME 2 G/1
2000 INJECTION, POWDER, FOR SOLUTION INTRAVENOUS EVERY 12 HOURS
Refills: 0 | Status: DISCONTINUED | OUTPATIENT
Start: 2024-08-30 | End: 2024-09-02

## 2024-08-30 RX ORDER — ASPIRIN 81 MG
0 TABLET, DELAYED RELEASE (ENTERIC COATED) ORAL
Refills: 0 | DISCHARGE

## 2024-08-30 RX ORDER — GLUCAGON INJECTION, SOLUTION 1 MG/.2ML
1 INJECTION, SOLUTION SUBCUTANEOUS ONCE
Refills: 0 | Status: DISCONTINUED | OUTPATIENT
Start: 2024-08-30 | End: 2024-09-04

## 2024-08-30 RX ADMIN — Medication 250 MILLIGRAM(S): at 22:25

## 2024-08-30 RX ADMIN — CEFEPIME 100 MILLIGRAM(S): 2 INJECTION, POWDER, FOR SOLUTION INTRAVENOUS at 14:12

## 2024-08-30 RX ADMIN — MEROPENEM 100 MILLIGRAM(S): 500 INJECTION, POWDER, FOR SOLUTION INTRAVENOUS at 06:45

## 2024-08-30 RX ADMIN — TAMSULOSIN HYDROCHLORIDE 0.4 MILLIGRAM(S): 0.4 CAPSULE ORAL at 21:53

## 2024-08-30 RX ADMIN — Medication 5000 UNIT(S): at 06:49

## 2024-08-30 RX ADMIN — Medication 100 MILLIGRAM(S): at 14:12

## 2024-08-30 RX ADMIN — NIFEDIPINE 60 MILLIGRAM(S): 60 TABLET, FILM COATED, EXTENDED RELEASE ORAL at 06:49

## 2024-08-30 RX ADMIN — LOSARTAN POTASSIUM 100 MILLIGRAM(S): 50 TABLET ORAL at 06:49

## 2024-08-30 RX ADMIN — Medication 5000 UNIT(S): at 21:53

## 2024-08-30 RX ADMIN — INSULIN GLARGINE 5 UNIT(S): 100 INJECTION, SOLUTION SUBCUTANEOUS at 21:53

## 2024-08-30 RX ADMIN — Medication 10 MILLIGRAM(S): at 21:53

## 2024-08-30 RX ADMIN — Medication 81 MILLIGRAM(S): at 14:12

## 2024-08-30 NOTE — CARE COORDINATION ASSESSMENT. - PRO ARRIVE FROM
CM met w pt. CM attempted to use  phone with [place] ID # [id number]. Pt kept repeating "am I staying here" to . CM notified bedside nurse who stated pt has had difficulty with  phone 2/2 being [place]. Pt alert, but is not appropriately responding to . CM spoke with son Markus Osuna via telephone [phone] to complete CCA.  Per son, pt lives w son. Pt was independent w ADL, ambulation  prior to admission. Pt son drives to appointments, has 3 stairs to enter home, 0 steps inside  Pt denies community services, has walker. CM verified: PCP: Dr. Monroy Pharmacy: [facility]. : stated no. Pt stated  will  when medically safe for discharge.

## 2024-08-30 NOTE — CONSULT NOTE ADULT - ASSESSMENT
CAR: Prerenal azotemia  LLE cellulitis, Left knee OM  Diabetes  Hypertension    Renal indices better. On ARB. Monitor creatinine trend. Trend BP and titrate BP meds as needed.   IV abx, ID follow up. Monitor blood sugar levels. Insulin coverage as needed. Dietary restriction. Ortho follow up.   Will follow electrolytes and renal function trend.     Further recommendations pending clinical course. Thank you for the courtesy of this referral.

## 2024-08-30 NOTE — CONSULT NOTE ADULT - SUBJECTIVE AND OBJECTIVE BOX
[facility]  INFECTIOUS DISEASES   [address] [place] [address]  Tel: [phone]     Fax: [phone]  ========================================================  MD Megan Bhatt Kaushal, MD Cho, Michelle, MD Sunjit, Jaspal, MD  ========================================================    [id number]  MARTHA MELCHOR     CC: Patient is a 89y old  Male who presents with a chief complaint of IV Abx (30 Aug 2024 09:31)    HPI:  89 Y.O.M with PMH of DM tytpe 2 insulin dependent , HTN, HLD, BPH and gout who presented to ED due to L knee pain that started about 2 days ago and noticed that it was getting swollen and warm to touch so he decided to come to ED. Patient of note pt had traumatic LLE calf area on may 2024 wound that was not healing , pt had debridement with Dr. zuniga and was following in wound clinic , currently completely healed   patient denied any fever , chills , nausea , vomiting , abdominal pain , chest pain or SOB   (29 Aug 2024 23:35)      PAST MEDICAL & SURGICAL HISTORY:  HTN (hypertension)      DM2 (diabetes mellitus, type 2)      BPH (benign prostatic hyperplasia)      Prostate CA  Radiation seed Implant      HLD (hyperlipidemia)      Gout      History of gastroesophageal reflux (GERD)      Shingles  8 years ago      S/P cataract surgery  right eye          Social Hx: No current smoking, EtOH or drugs     FAMILY HISTORY:  FHx: lung cancer    Noncontributory     Allergies    No Known Allergies    Intolerances        MEDICATIONS  (STANDING):  allopurinol 100 milliGRAM(s) Oral daily  aspirin enteric coated 81 milliGRAM(s) Oral daily  atorvastatin 10 milliGRAM(s) Oral at bedtime  cefepime   IVPB 2000 milliGRAM(s) IV Intermittent every 12 hours  dextrose 5%. 1000 milliLiter(s) (50 mL/Hr) IV Continuous <Continuous>  dextrose 5%. 1000 milliLiter(s) (100 mL/Hr) IV Continuous <Continuous>  dextrose 50% Injectable 12.5 Gram(s) IV Push once  dextrose 50% Injectable 25 Gram(s) IV Push once  dextrose 50% Injectable 25 Gram(s) IV Push once  glucagon  Injectable 1 milliGRAM(s) IntraMuscular once  insulin glargine Injectable (LANTUS) 5 Unit(s) SubCutaneous at bedtime  insulin lispro (ADMELOG) corrective regimen sliding scale   SubCutaneous three times a day before meals  losartan 100 milliGRAM(s) Oral daily  NIFEdipine XL 60 milliGRAM(s) Oral daily  tamsulosin 0.4 milliGRAM(s) Oral at bedtime  vancomycin  IVPB 1000 milliGRAM(s) IV Intermittent every 24 hours    MEDICATIONS  (PRN):  dextrose Oral Gel 15 Gram(s) Oral once PRN Blood Glucose LESS THAN 70 milliGRAM(s)/deciliter       REVIEW OF SYSTEMS:  CONSTITUTIONAL:  No Fever or chills  HEENT:  No diplopia or blurred vision.  No sore throat or runny nose.  CARDIOVASCULAR:  No chest pain   RESPIRATORY:  No cough, shortness of breath, PND or orthopnea.  GASTROINTESTINAL:  No nausea, vomiting or diarrhea.  GENITOURINARY:  No dysuria, frequency or urgency. No Blood in urine  MUSCULOSKELETAL:  no joint aches, no muscle pain  SKIN:  No change in skin, hair or nails.    Physical Exam:  Vital Signs Last 24 Hrs  T(C): 37 (30 Aug 2024 05:00), Max: 37 (30 Aug 2024 05:00)  T(F): 98.6 (30 Aug 2024 05:00), Max: 98.6 (30 Aug 2024 05:00)  HR: 76 (30 Aug 2024 05:00) (76 - 88)  BP: 154/70 (30 Aug 2024 05:00) (144/72 - 175/83)  BP(mean): --  RR: 17 (30 Aug 2024 05:00) (17 - 19)  SpO2: 96% (30 Aug 2024 05:00) (96% - 100%)    Parameters below as of 30 Aug 2024 05:00  Patient On (Oxygen Delivery Method): room air      Height (cm): 170.2 (08-29 @ 19:23)  Weight (kg): 70.3 (08-29 @ 19:23)  BMI (kg/m2): 24.3 (08-29 @ 19:23)  BSA (m2): 1.81 (08-29 @ 19:23)  GEN: NAD  HEENT: normocephalic and atraumatic. EOMI. PERRL.    NECK: Supple.  No lymphadenopathy   LUNGS: Clear to auscultation.  HEART: Regular rate and rhythm without murmur.  ABDOMEN: Soft, nontender, and nondistended.  Positive bowel sounds.    : No CVA tenderness  EXTREMITIES: Without edema.  NEUROLOGIC: grossly intact.  PSYCHIATRIC: Appropriate affect .  SKIN: No rash     Labs:  08-30    141  |  109<H>  |  19  ----------------------------<  125<H>  3.7   |  27  |  1.10    Ca    8.5      30 Aug 2024 08:10    TPro  6.3  /  Alb  2.8<L>  /  TBili  0.5  /  DBili  x   /  AST  14<L>  /  ALT  22  /  AlkPhos  84  08-30                          11.5   8.52  )-----------( 159      ( 30 Aug 2024 08:10 )             33.6     PT/INR - ( 29 Aug 2024 20:50 )   PT: 11.0 sec;   INR: 0.96 ratio         PTT - ( 29 Aug 2024 20:50 )  PTT:30.6 sec  Urinalysis Basic - ( 30 Aug 2024 08:10 )    Color: x / Appearance: x / SG: x / pH: x  Gluc: 125 mg/dL / Ketone: x  / Bili: x / Urobili: x   Blood: x / Protein: x / Nitrite: x   Leuk Esterase: x / RBC: x / WBC x   Sq Epi: x / Non Sq Epi: x / Bacteria: x      LIVER FUNCTIONS - ( 30 Aug 2024 08:10 )  Alb: 2.8 g/dL / Pro: 6.3 g/dL / ALK PHOS: 84 U/L / ALT: 22 U/L / AST: 14 U/L / GGT: x             C-Reactive Protein: 17 mg/L (08-30-24 @ 08:10)  C-Reactive Protein: 12 mg/L (08-29-24 @ 20:50)    Sedimentation Rate, Erythrocyte: 31 mm/hr (08-30-24 @ 08:10)  Sedimentation Rate, Erythrocyte: 37 mm/hr (08-29-24 @ 20:50)      RECENT CULTURES:    All imaging and other data have been reviewed.      Assessment and Plan:       #Left knee cellulitis  - continue    Thank you for courtesy of this consult.     Will follow.  Discussed with the primary team.     Clover Wolff MD  Division of Infectious Diseases   Please call ID service at [phone] with any question.    75 minutes spent on total encounter assessing patient, examination, chart review, counseling and coordinating care by the attending physician/nurse/care manager.   [facility]  INFECTIOUS DISEASES   [address] [place] [address]  Tel: [phone]     Fax: [phone]  ========================================================  MD Megan Bhatt Kaushal, MD Cho, Michelle, MD Sunjit, Jaspal, MD  ========================================================    [id number]  MARTHA MELCHOR     CC: Patient is a 89y old  Male who presents with a chief complaint of IV Abx (30 Aug 2024 09:31)    HPI:  89 Y.O.M with PMH of DM type 2 insulin dependent , HTN, HLD, BPH and gout who presented to ED due to L knee pain that started about 2 days ago and noticed that it was getting swollen and warm to touch so he decided to come to ED. Patient of note pt had traumatic LLE calf area on may 2024 wound that was not healing , pt had debridement with Dr. zuniga and was following in wound clinic, currently completely healed patient denied any fever, chills , nausea , vomiting , abdominal pain , chest pain or SOB.    On exam today, attempted to communicate with patient using mandarin  but unable to complete assessment due to patient being hard of hearing. Attempted to call patient's family, but emergency contact was unreachable.    PAST MEDICAL & SURGICAL HISTORY:  HTN (hypertension)      DM2 (diabetes mellitus, type 2)      BPH (benign prostatic hyperplasia)      Prostate CA  Radiation seed Implant      HLD (hyperlipidemia)      Gout      History of gastroesophageal reflux (GERD)      Shingles  8 years ago      S/P cataract surgery  right eye      Social Hx: No current smoking, EtOH or drugs     FAMILY HISTORY:  FHx: lung cancer    Noncontributory     Allergies    No Known Allergies    Intolerances        MEDICATIONS  (STANDING):  allopurinol 100 milliGRAM(s) Oral daily  aspirin enteric coated 81 milliGRAM(s) Oral daily  atorvastatin 10 milliGRAM(s) Oral at bedtime  cefepime   IVPB 2000 milliGRAM(s) IV Intermittent every 12 hours  dextrose 5%. 1000 milliLiter(s) (50 mL/Hr) IV Continuous <Continuous>  dextrose 5%. 1000 milliLiter(s) (100 mL/Hr) IV Continuous <Continuous>  dextrose 50% Injectable 12.5 Gram(s) IV Push once  dextrose 50% Injectable 25 Gram(s) IV Push once  dextrose 50% Injectable 25 Gram(s) IV Push once  glucagon  Injectable 1 milliGRAM(s) IntraMuscular once  insulin glargine Injectable (LANTUS) 5 Unit(s) SubCutaneous at bedtime  insulin lispro (ADMELOG) corrective regimen sliding scale   SubCutaneous three times a day before meals  losartan 100 milliGRAM(s) Oral daily  NIFEdipine XL 60 milliGRAM(s) Oral daily  tamsulosin 0.4 milliGRAM(s) Oral at bedtime  vancomycin  IVPB 1000 milliGRAM(s) IV Intermittent every 24 hours    MEDICATIONS  (PRN):  dextrose Oral Gel 15 Gram(s) Oral once PRN Blood Glucose LESS THAN 70 milliGRAM(s)/deciliter       REVIEW OF SYSTEMS:  CONSTITUTIONAL:  No Fever or chills  HEENT:  No diplopia or blurred vision.  No sore throat or runny nose.  CARDIOVASCULAR:  No chest pain   RESPIRATORY:  No cough, shortness of breath, PND or orthopnea.  GASTROINTESTINAL:  No nausea, vomiting or diarrhea.  GENITOURINARY:  No dysuria, frequency or urgency. No Blood in urine  MUSCULOSKELETAL:  no joint aches, no muscle pain  SKIN:  No change in skin, hair or nails.    Physical Exam:  Vital Signs Last 24 Hrs  T(C): 37 (30 Aug 2024 05:00), Max: 37 (30 Aug 2024 05:00)  T(F): 98.6 (30 Aug 2024 05:00), Max: 98.6 (30 Aug 2024 05:00)  HR: 76 (30 Aug 2024 05:00) (76 - 88)  BP: 154/70 (30 Aug 2024 05:00) (144/72 - 175/83)  BP(mean): --  RR: 17 (30 Aug 2024 05:00) (17 - 19)  SpO2: 96% (30 Aug 2024 05:00) (96% - 100%)    Parameters below as of 30 Aug 2024 05:00  Patient On (Oxygen Delivery Method): room air      Height (cm): 170.2 (08-29 @ 19:23)  Weight (kg): 70.3 (08-29 @ 19:23)  BMI (kg/m2): 24.3 (08-29 @ 19:23)  BSA (m2): 1.81 (08-29 @ 19:23)  GEN: NAD  HEENT: normocephalic and atraumatic. EOMI. PERRL.    LUNGS: Clear to auscultation.  HEART: Regular rate and rhythm without murmur.  ABDOMEN: Soft, nontender, and nondistended.  Positive bowel sounds.    : No CVA tenderness  EXTREMITIES: Red erythema in left medial knee with edema. No fluctuance appreciated on exam, skin intact with no discharge. Previous left calf wound healed. R Knee ROM intact, L knee ROM limited on exam.    NEUROLOGIC: grossly intact.  PSYCHIATRIC: Appropriate affect .  SKIN: No rash     Labs:  08-30    141  |  109<H>  |  19  ----------------------------<  125<H>  3.7   |  27  |  1.10    Ca    8.5      30 Aug 2024 08:10    TPro  6.3  /  Alb  2.8<L>  /  TBili  0.5  /  DBili  x   /  AST  14<L>  /  ALT  22  /  AlkPhos  84  08-30                          11.5   8.52  )-----------( 159      ( 30 Aug 2024 08:10 )             33.6     PT/INR - ( 29 Aug 2024 20:50 )   PT: 11.0 sec;   INR: 0.96 ratio         PTT - ( 29 Aug 2024 20:50 )  PTT:30.6 sec  Urinalysis Basic - ( 30 Aug 2024 08:10 )    Color: x / Appearance: x / SG: x / pH: x  Gluc: 125 mg/dL / Ketone: x  / Bili: x / Urobili: x   Blood: x / Protein: x / Nitrite: x   Leuk Esterase: x / RBC: x / WBC x   Sq Epi: x / Non Sq Epi: x / Bacteria: x      LIVER FUNCTIONS - ( 30 Aug 2024 08:10 )  Alb: 2.8 g/dL / Pro: 6.3 g/dL / ALK PHOS: 84 U/L / ALT: 22 U/L / AST: 14 U/L / GGT: x             C-Reactive Protein: 17 mg/L (08-30-24 @ 08:10)  C-Reactive Protein: 12 mg/L (08-29-24 @ 20:50)    Sedimentation Rate, Erythrocyte: 31 mm/hr (08-30-24 @ 08:10)  Sedimentation Rate, Erythrocyte: 37 mm/hr (08-29-24 @ 20:50)      RECENT CULTURES:    All imaging and other data have been reviewed.      Assessment and Plan:   Patient is an 88 yo male with PMH of DM2, HTN, HLD, BPH, and gout who presented to the ED for 2 days of left knee pain and was admitted for IV antibiotic treatment of possible left knee cellulitis. Patient was afebrile on admission, has been hemodynamically stable. ESR was elevated to 3.7 and has downtrended to 3.1 WBC were wnl, lactate was initially elevated but downtrended from 2.4 to 1.3. Patient received one time dose of vancomycin and zosyn. Then, antibiotic was changed to meropenem (renally dosed) given patient's CKD. CT of left knee showed focal osteomyelitis along the anteromedial margin of the tibial component and well as overlying soft tissue phlegmon w/o definite drainable abscess due to limitations of CT w/o contrast. Patient on exam today was well appearing but unable to participate in interview with this writer. Emergency contact was also unable to be reached. On exam, patient has medial left knee erythema and mild edema that is non fluctuant. Patient did not appear to be in pain during exam and had limited ROM in left knee. Differentials include cellulitis, gout, pseudogout, osteomyelitis, and prosthetic joint infection. Knee has been aspirated x2 per ortho, awaiting culture and gram stain results. Consider switch to cefepime for broad coverage with better safety profile in pt with CKD.      #Left knee cellulitis?  - c    Thank you for courtesy of this consult.     Will follow.  Discussed with the primary team.     Clover Wolff MD  Division of Infectious Diseases   Please call ID service at [phone] with any question.    75 minutes spent on total encounter assessing patient, examination, chart review, counseling and coordinating care by the attending physician/nurse/care manager.   [facility]  INFECTIOUS DISEASES   [address] [place] [address]  Tel: [phone]     Fax: [phone]  ========================================================  MD Megan Bhatt Kaushal, MD Cho, Michelle, MD Sunjit, Jaspal, MD  ========================================================    [id number]  MARTHA MELCHOR     CC: Patient is a 89y old  Male who presents with a chief complaint of IV Abx (30 Aug 2024 09:31)    HPI:  89 Y.O.M with PMH of DM type 2 insulin dependent , HTN, HLD, BPH and gout who presented to ED due to L knee pain that started about 2 days ago and noticed that it was getting swollen and warm to touch so he decided to come to ED. Patient of note pt had traumatic LLE calf area on may 2024 wound that was not healing , pt had debridement with Dr. zuniga and was following in wound clinic, currently completely healed patient denied any fever, chills , nausea , vomiting , abdominal pain , chest pain or SOB.    On exam today, attempted to communicate with patient using mandarin  but unable to complete assessment due to patient being hard of hearing. Attempted to call patient's family, but emergency contact was unreachable.    PAST MEDICAL & SURGICAL HISTORY:  HTN (hypertension)      DM2 (diabetes mellitus, type 2)      BPH (benign prostatic hyperplasia)      Prostate CA  Radiation seed Implant      HLD (hyperlipidemia)      Gout      History of gastroesophageal reflux (GERD)      Shingles  8 years ago      S/P cataract surgery  right eye      Social Hx: No current smoking, EtOH or drugs     FAMILY HISTORY:  FHx: lung cancer    Noncontributory     Allergies    No Known Allergies    Intolerances        MEDICATIONS  (STANDING):  allopurinol 100 milliGRAM(s) Oral daily  aspirin enteric coated 81 milliGRAM(s) Oral daily  atorvastatin 10 milliGRAM(s) Oral at bedtime  cefepime   IVPB 2000 milliGRAM(s) IV Intermittent every 12 hours  dextrose 5%. 1000 milliLiter(s) (50 mL/Hr) IV Continuous <Continuous>  dextrose 5%. 1000 milliLiter(s) (100 mL/Hr) IV Continuous <Continuous>  dextrose 50% Injectable 12.5 Gram(s) IV Push once  dextrose 50% Injectable 25 Gram(s) IV Push once  dextrose 50% Injectable 25 Gram(s) IV Push once  glucagon  Injectable 1 milliGRAM(s) IntraMuscular once  insulin glargine Injectable (LANTUS) 5 Unit(s) SubCutaneous at bedtime  insulin lispro (ADMELOG) corrective regimen sliding scale   SubCutaneous three times a day before meals  losartan 100 milliGRAM(s) Oral daily  NIFEdipine XL 60 milliGRAM(s) Oral daily  tamsulosin 0.4 milliGRAM(s) Oral at bedtime  vancomycin  IVPB 1000 milliGRAM(s) IV Intermittent every 24 hours    MEDICATIONS  (PRN):  dextrose Oral Gel 15 Gram(s) Oral once PRN Blood Glucose LESS THAN 70 milliGRAM(s)/deciliter       REVIEW OF SYSTEMS:  CONSTITUTIONAL:  No Fever or chills  HEENT:  No diplopia or blurred vision.  No sore throat or runny nose.  CARDIOVASCULAR:  No chest pain   RESPIRATORY:  No cough, shortness of breath, PND or orthopnea.  GASTROINTESTINAL:  No nausea, vomiting or diarrhea.  GENITOURINARY:  No dysuria, frequency or urgency. No Blood in urine  MUSCULOSKELETAL:  no joint aches, no muscle pain  SKIN:  No change in skin, hair or nails.    Physical Exam:  Vital Signs Last 24 Hrs  T(C): 37 (30 Aug 2024 05:00), Max: 37 (30 Aug 2024 05:00)  T(F): 98.6 (30 Aug 2024 05:00), Max: 98.6 (30 Aug 2024 05:00)  HR: 76 (30 Aug 2024 05:00) (76 - 88)  BP: 154/70 (30 Aug 2024 05:00) (144/72 - 175/83)  BP(mean): --  RR: 17 (30 Aug 2024 05:00) (17 - 19)  SpO2: 96% (30 Aug 2024 05:00) (96% - 100%)    Parameters below as of 30 Aug 2024 05:00  Patient On (Oxygen Delivery Method): room air      Height (cm): 170.2 (08-29 @ 19:23)  Weight (kg): 70.3 (08-29 @ 19:23)  BMI (kg/m2): 24.3 (08-29 @ 19:23)  BSA (m2): 1.81 (08-29 @ 19:23)  GEN: NAD  HEENT: normocephalic and atraumatic. EOMI. PERRL.    LUNGS: Clear to auscultation.  HEART: Regular rate and rhythm without murmur.  ABDOMEN: Soft, nontender, and nondistended.  Positive bowel sounds.    : No CVA tenderness  EXTREMITIES: Red erythema in left medial knee with edema. No fluctuance appreciated on exam, skin intact with no discharge. Previous left calf wound healed. R Knee ROM intact, L knee ROM limited on exam.    NEUROLOGIC: grossly intact.  PSYCHIATRIC: Appropriate affect .  SKIN: No rash     Labs:  08-30    141  |  109<H>  |  19  ----------------------------<  125<H>  3.7   |  27  |  1.10    Ca    8.5      30 Aug 2024 08:10    TPro  6.3  /  Alb  2.8<L>  /  TBili  0.5  /  DBili  x   /  AST  14<L>  /  ALT  22  /  AlkPhos  84  08-30                          11.5   8.52  )-----------( 159      ( 30 Aug 2024 08:10 )             33.6     PT/INR - ( 29 Aug 2024 20:50 )   PT: 11.0 sec;   INR: 0.96 ratio         PTT - ( 29 Aug 2024 20:50 )  PTT:30.6 sec  Urinalysis Basic - ( 30 Aug 2024 08:10 )    Color: x / Appearance: x / SG: x / pH: x  Gluc: 125 mg/dL / Ketone: x  / Bili: x / Urobili: x   Blood: x / Protein: x / Nitrite: x   Leuk Esterase: x / RBC: x / WBC x   Sq Epi: x / Non Sq Epi: x / Bacteria: x      LIVER FUNCTIONS - ( 30 Aug 2024 08:10 )  Alb: 2.8 g/dL / Pro: 6.3 g/dL / ALK PHOS: 84 U/L / ALT: 22 U/L / AST: 14 U/L / GGT: x             C-Reactive Protein: 17 mg/L (08-30-24 @ 08:10)  C-Reactive Protein: 12 mg/L (08-29-24 @ 20:50)    Sedimentation Rate, Erythrocyte: 31 mm/hr (08-30-24 @ 08:10)  Sedimentation Rate, Erythrocyte: 37 mm/hr (08-29-24 @ 20:50)      RECENT CULTURES:    All imaging and other data have been reviewed.      Assessment and Plan:   Patient is an 88 yo male with PMH of DM2, HTN, HLD, BPH, and gout who presented to the ED for 2 days of left knee pain and was admitted for IV antibiotic treatment of possible left knee cellulitis. Patient was afebrile on admission, has been hemodynamically stable. ESR was elevated to 3.7 and has downtrended to 3.1 WBC were wnl, lactate was initially elevated but downtrended from 2.4 to 1.3. Patient received one time dose of vancomycin and zosyn. Then, antibiotic was changed to meropenem (renally dosed) given patient's CKD. CT of left knee showed focal osteomyelitis along the anteromedial margin of the tibial component and well as overlying soft tissue phlegmon w/o definite drainable abscess due to limitations of CT w/o contrast. Patient on exam today was well appearing but unable to participate in interview with this writer. Emergency contact was also unable to be reached. On exam, patient has medial left knee erythema and mild edema that is non fluctuant. Patient did not appear to be in pain during exam and had limited ROM in left knee. Differentials include prosthetic joint infection, osteomyelitis, gout, and pseudogout. Knee has been aspirated x2 per ortho, awaiting culture and gram stain results. Request possible joint PCR as well. Consider left knee MRI for osteomyelitis work up. Continue vancomycin 1g q24h and consider switch to cefepime for broad coverage with better safety profile in pt with CKD.      #Left knee prosthetic joint infection  #Osteomyelitis  - Continue vancomycin 1g q24h and consider cefepime for broad abx coverage.  - Follow joint aspiration and possible joint PCR.  - Consider MRI for work up for possible osteomyelitis per primary team.    Thank you for courtesy of this consult.     Will follow.  Discussed with the primary team.     Clover Wolff MD  Division of Infectious Diseases   Please call ID service at [phone] with any question.    75 minutes spent on total encounter assessing patient, examination, chart review, counseling and coordinating care by the attending physician/nurse/care manager.   [facility]  INFECTIOUS DISEASES   [address] [place] [address]  Tel: [phone]     Fax: [phone]  ========================================================  MD Megan Bhatt Kaushal, MD Cho, Michelle, MD Sunjit, Jaspal, MD  ========================================================    N-[id number]  MARTHA MELCHOR     CC: Patient is a 89y old  Male who presents with a chief complaint of IV Abx (30 Aug 2024 09:31)    HPI:  89 Y.O.M with PMH of DM type 2 insulin dependent , HTN, HLD, BPH and gout who presented to ED due to L knee pain that started about 2 days ago and noticed that it was getting swollen and warm to touch so he decided to come to ED. Patient of note pt had traumatic LLE calf area on may 2024 wound that was not healing , pt had debridement with Dr. zuniga and was following in wound clinic, currently completely healed patient denied any fever, chills , nausea , vomiting , abdominal pain , chest pain or SOB.    On exam today, attempted to communicate with patient using mandarin  but unable to complete assessment due to patient being hard of hearing. Attempted to call patient's family, but emergency contact was unreachable.    PAST MEDICAL & SURGICAL HISTORY:  HTN (hypertension)      DM2 (diabetes mellitus, type 2)      BPH (benign prostatic hyperplasia)      Prostate CA  Radiation seed Implant      HLD (hyperlipidemia)      Gout      History of gastroesophageal reflux (GERD)      Shingles  8 years ago      S/P cataract surgery  right eye      Social Hx: No current smoking, EtOH or drugs     FAMILY HISTORY:  FHx: lung cancer    Noncontributory     Allergies    No Known Allergies    Intolerances        MEDICATIONS  (STANDING):  allopurinol 100 milliGRAM(s) Oral daily  aspirin enteric coated 81 milliGRAM(s) Oral daily  atorvastatin 10 milliGRAM(s) Oral at bedtime  cefepime   IVPB 2000 milliGRAM(s) IV Intermittent every 12 hours  dextrose 5%. 1000 milliLiter(s) (50 mL/Hr) IV Continuous <Continuous>  dextrose 5%. 1000 milliLiter(s) (100 mL/Hr) IV Continuous <Continuous>  dextrose 50% Injectable 12.5 Gram(s) IV Push once  dextrose 50% Injectable 25 Gram(s) IV Push once  dextrose 50% Injectable 25 Gram(s) IV Push once  glucagon  Injectable 1 milliGRAM(s) IntraMuscular once  insulin glargine Injectable (LANTUS) 5 Unit(s) SubCutaneous at bedtime  insulin lispro (ADMELOG) corrective regimen sliding scale   SubCutaneous three times a day before meals  losartan 100 milliGRAM(s) Oral daily  NIFEdipine XL 60 milliGRAM(s) Oral daily  tamsulosin 0.4 milliGRAM(s) Oral at bedtime  vancomycin  IVPB 1000 milliGRAM(s) IV Intermittent every 24 hours    MEDICATIONS  (PRN):  dextrose Oral Gel 15 Gram(s) Oral once PRN Blood Glucose LESS THAN 70 milliGRAM(s)/deciliter       REVIEW OF SYSTEMS:  CONSTITUTIONAL:  No Fever or chills  HEENT:  No diplopia or blurred vision.  No sore throat or runny nose.  CARDIOVASCULAR:  No chest pain   RESPIRATORY:  No cough, shortness of breath, PND or orthopnea.  GASTROINTESTINAL:  No nausea, vomiting or diarrhea.  GENITOURINARY:  No dysuria, frequency or urgency. No Blood in urine  MUSCULOSKELETAL:  no joint aches, no muscle pain  SKIN:  No change in skin, hair or nails.    Physical Exam:  Vital Signs Last 24 Hrs  T(C): 37 (30 Aug 2024 05:00), Max: 37 (30 Aug 2024 05:00)  T(F): 98.6 (30 Aug 2024 05:00), Max: 98.6 (30 Aug 2024 05:00)  HR: 76 (30 Aug 2024 05:00) (76 - 88)  BP: 154/70 (30 Aug 2024 05:00) (144/72 - 175/83)  BP(mean): --  RR: 17 (30 Aug 2024 05:00) (17 - 19)  SpO2: 96% (30 Aug 2024 05:00) (96% - 100%)    Parameters below as of 30 Aug 2024 05:00  Patient On (Oxygen Delivery Method): room air      Height (cm): 170.2 (08-29 @ 19:23)  Weight (kg): 70.3 (08-29 @ 19:23)  BMI (kg/m2): 24.3 (08-29 @ 19:23)  BSA (m2): 1.81 (08-29 @ 19:23)  GEN: NAD  HEENT: normocephalic and atraumatic. EOMI. PERRL.    LUNGS: Clear to auscultation.  HEART: Regular rate and rhythm without murmur.  ABDOMEN: Soft, nontender, and nondistended.  Positive bowel sounds.    : No CVA tenderness  EXTREMITIES: Red erythema in left medial knee with edema. No fluctuance appreciated on exam, skin intact with no discharge. Previous left calf wound healed. R Knee ROM intact, L knee ROM limited on exam.    NEUROLOGIC: grossly intact.  PSYCHIATRIC: Appropriate affect .  SKIN: No rash     Labs:  08-30    141  |  109<H>  |  19  ----------------------------<  125<H>  3.7   |  27  |  1.10    Ca    8.5      30 Aug 2024 08:10    TPro  6.3  /  Alb  2.8<L>  /  TBili  0.5  /  DBili  x   /  AST  14<L>  /  ALT  22  /  AlkPhos  84  08-30                          11.5   8.52  )-----------( 159      ( 30 Aug 2024 08:10 )             33.6     PT/INR - ( 29 Aug 2024 20:50 )   PT: 11.0 sec;   INR: 0.96 ratio         PTT - ( 29 Aug 2024 20:50 )  PTT:30.6 sec  Urinalysis Basic - ( 30 Aug 2024 08:10 )    Color: x / Appearance: x / SG: x / pH: x  Gluc: 125 mg/dL / Ketone: x  / Bili: x / Urobili: x   Blood: x / Protein: x / Nitrite: x   Leuk Esterase: x / RBC: x / WBC x   Sq Epi: x / Non Sq Epi: x / Bacteria: x      LIVER FUNCTIONS - ( 30 Aug 2024 08:10 )  Alb: 2.8 g/dL / Pro: 6.3 g/dL / ALK PHOS: 84 U/L / ALT: 22 U/L / AST: 14 U/L / GGT: x             C-Reactive Protein: 17 mg/L (08-30-24 @ 08:10)  C-Reactive Protein: 12 mg/L (08-29-24 @ 20:50)    Sedimentation Rate, Erythrocyte: 31 mm/hr (08-30-24 @ 08:10)  Sedimentation Rate, Erythrocyte: 37 mm/hr (08-29-24 @ 20:50)      RECENT CULTURES:    All imaging and other data have been reviewed.    < from: CT Lower Extremity No Cont, Left (08.30.24 @ 01:08) >  FINDINGS:    Osseous: Status post remote cemented knee total arthroplasty and   posterior patellar remodeling without significant effusion. Ill-defined   focal region of osteolysis along the anteromedial margin of the tibial   stem with adjacent cortical erosion and overlying soft tissue   inflammatory stranding is new since CT dated 5/27/2024. Subtle osteolysis   surrounding the tip of the tibial stem is also new from prior   examination. No subsidence, periprosthetic fracture, or dislocation.    Soft tissues: Inflammatory stranding most prominent along the   anteromedial margin of the proximal tibial metaphysis, but without   discrete sizable hyperattenuating hematoma or definite drainable   encapsulated fluid collection on this non-IV contrast enhanced   examination. No tracking emphysema..    IMPRESSION:    Status post left knee total arthroplasty with CT evidence for interval   development of focal osteomyelitis along the anteromedial margin of the   tibial component at the level of the metaphysis. Overlyingsoft tissue   phlegmon without definite drainable abscess on this non-IV contrast   enhanced examination. Findings discussed with attending Dr. Reyez at   approximately 9:00 AM on 8/30/2024 by Dr. Jamison.      < end of copied text >      Assessment and Plan:   Patient is an 89 yo male with PMH of DM2, HTN, HLD, BPH, and gout who presented to the ED for 2 days of left knee pain and was admitted for IV antibiotic treatment of possible left knee cellulitis. Patient was afebrile on admission, has been hemodynamically stable. ESR was elevated to 3.7 and has downtrended to 3.1 WBC were wnl, lactate was initially elevated but downtrended from 2.4 to 1.3. Patient received one time dose of vancomycin and zosyn. Then, antibiotic was changed to meropenem (renally dosed) given patient's CKD. CT of left knee showed focal osteomyelitis along the anteromedial margin of the tibial component and well as overlying soft tissue phlegmon w/o definite drainable abscess due to limitations of CT w/o contrast. Patient on exam today was well appearing but unable to participate in interview with this writer. Emergency contact was also unable to be reached. On exam, patient has medial left knee erythema and mild edema that is non fluctuant. Patient did not appear to be in pain during exam and had limited ROM in left knee. Differentials include prosthetic joint infection, osteomyelitis, gout, and pseudogout. Knee has been aspirated x2 per ortho, awaiting culture and gram stain results. Request possible joint PCR as well. Consider left knee MRI for osteomyelitis work up. Continue vancomycin 1g q24h and consider switch to cefepime for broad coverage with better safety profile in pt with CKD.      #Left knee prosthetic joint infection  #Osteomyelitis  - Continue vancomycin 1g q24h and consider cefepime for broad abx coverage.  - Follow joint aspiration and possible joint PCR.  - Consider MRI for work up for possible osteomyelitis per primary team.    Thank you for courtesy of this consult.     Will follow.  Discussed with the primary team.     Clover Wolff MD  Division of Infectious Diseases   Please call ID service at [phone] with any question.    75 minutes spent on total encounter assessing patient, examination, chart review, counseling and coordinating care by the attending physician/nurse/care manager.

## 2024-08-30 NOTE — CONSULT NOTE ADULT - ATTENDING COMMENTS
89 Y.O.M with PMH of DM type 2 insulin dependent , HTN, HLD, BPH and gout who presented to ED due to L knee pain that started about 2 days ago and noticed that it was getting swollen and warm to touch so he decided to come to ED. Seen with Dr. Reyez at bedside, who provided translation. Patient of note pt had traumatic LLE wound on the calf area (from metal bed rail) on May 2024. He had debridement with Dr. zuniga and was following in wound clinic, wound appears healed now. Denies any pain elsewhere. Denies any fevers or chills.    Patient found to have small joint effusion on L knee, s/p arthrocentesis to evaluate for inflammatory arthropathy vs prosthetic joint infection. CT also showed evidence of focal osteomyelitis along the anteromedial margin of the tibial component at the level of the metaphysis, with overlying soft tissue phlegmon without definite drainable abscess. Otherwise no fever and no leukocytosis. Baseline CRP is 12-17.    #Left knee pain  #L knee effusion  #Tibial osteomyelitis    -suggest cefepime (renally dosed), discontinue meropenem  -continue vancomycin for now  -send joint fluid for PCR  -follow up joint fluid for cell count, crystals, gram stain and culture  -consider MRI of lower leg to better evaluate tibial osteomyelitis    Thank you for courtesy of this consult.     Will follow.  Discussed with the primary team.     Clover Wolff MD  Division of Infectious Diseases   Please call ID service at [phone] with any question.      75 minutes spent on total encounter assessing patient, examination, chart review, counseling and coordinating care by the attending physician/nurse/care manager. 89 Y.O.M with PMH of DM type 2 insulin dependent , HTN, HLD, BPH and gout who presented to ED due to L knee pain that started about 2 days ago and noticed that it was getting swollen and warm to touch so he decided to come to ED. Seen with Dr. Reyez at bedside, who provided translation. Patient of note pt had traumatic LLE wound on the calf area (from metal bed rail) on May 2024. He had debridement with Dr. zuniga and was following in wound clinic, wound appears healed now. Denies any pain elsewhere. Denies any fevers or chills.    Patient found to have small joint effusion on L knee, s/p arthrocentesis to evaluate for inflammatory arthropathy vs prosthetic joint infection. CT also showed evidence of focal osteomyelitis along the anteromedial margin of the tibial component at the level of the metaphysis, with overlying soft tissue phlegmon without definite drainable abscess. Otherwise no fever and no leukocytosis. Baseline CRP is 12-17.    #Left knee pain  #L knee effusion  #Tibial osteomyelitis    -suggest cefepime (renally dosed), discontinue meropenem  -continue vancomycin for now  -send joint fluid for PCR  -follow up joint fluid for cell count, crystals, gram stain and culture  -consider MRI of lower leg to better evaluate tibial osteomyelitis    Thank you for courtesy of this consult. I will be covered by Dr. Yandel Nguyen this weekend.    Discussed with the primary team.     Clover Wolff MD  Division of Infectious Diseases   Please call ID service at [phone] with any question.      75 minutes spent on total encounter assessing patient, examination, chart review, counseling and coordinating care by the attending physician/nurse/care manager.

## 2024-08-30 NOTE — PATIENT CHOICE NOTE. - NSPTCHOICENOTES_GEN_ALL_CORE
D/C resource folder provided at bedside which includes lists for both rehab facilities and home care agencies and transportation letter advising on ambulance and ambulette transportation. CM reviewed folder during assessment with pt leno Markus.

## 2024-08-30 NOTE — PATIENT PROFILE ADULT - NSPROHMDIABETMGMTSTRAT_GEN_A_NUR
SW/CM Discharge Plan  Informed patient is ready for discharge.  Patient to be picked up by family. Patient/interested person has been counseled for post hospitalization care.   Initial implementation of the patient’s discharge plan has been arranged, including any devices/equipment needed for discharge. Discharge plan communicated to RN.    Patient’s discharge destination is Home with Home Health.    Selected Continued Care - Admitted Since 8/14/2024       Home Medical Care Coordination complete. Patient indicates having no preference.      Service Provider Selected Services Address Phone Fax    [facility] [address] [phone]                     .   diet modification/insulin therapy

## 2024-08-30 NOTE — CASE MANAGEMENT PROGRESS NOTE - NSCMPROGRESSNOTE_GEN_ALL_CORE
Discussed pt on rounds, pt remains acute, on IV  meropenem, IV Vanco, NPO. CM will continue to collaborate with interdisciplinary team and remain available to assist.

## 2024-08-30 NOTE — PATIENT PROFILE ADULT - FALL HARM RISK - HARM RISK INTERVENTIONS
Assistance with ambulation/Assistance OOB with selected safe patient handling equipment/Communicate Risk of Fall with Harm to all staff/Discuss with provider need for PT consult/Monitor gait and stability/Reinforce activity limits and safety measures with patient and family/Tailored Fall Risk Interventions/Visual Cue: Yellow wristband and red socks/Bed in lowest position, wheels locked, appropriate side rails in place/Call bell, personal items and telephone in reach/Instruct patient to call for assistance before getting out of bed or chair/Non-slip footwear when patient is out of bed/[place] to call system/Physically safe environment - no spills, clutter or unnecessary equipment/Purposeful Proactive Rounding/Room/bathroom lighting operational, light cord in reach

## 2024-08-30 NOTE — PROGRESS NOTE ADULT - SUBJECTIVE AND OBJECTIVE BOX
Patient is a 89y old  Male who presents with a chief complaint of IV Abx (30 Aug 2024 12:22)      Subjective:  INTERVAL HPI/OVERNIGHT EVENTS: Patient seen and examined at bedside.  Patient c/o LLE pain and pain upon movement to the leg and ROM to knee   MEDICATIONS  (STANDING):  allopurinol 100 milliGRAM(s) Oral daily  aspirin enteric coated 81 milliGRAM(s) Oral daily  atorvastatin 10 milliGRAM(s) Oral at bedtime  cefepime   IVPB 2000 milliGRAM(s) IV Intermittent every 12 hours  dextrose 5%. 1000 milliLiter(s) (100 mL/Hr) IV Continuous <Continuous>  dextrose 5%. 1000 milliLiter(s) (50 mL/Hr) IV Continuous <Continuous>  dextrose 50% Injectable 25 Gram(s) IV Push once  dextrose 50% Injectable 12.5 Gram(s) IV Push once  dextrose 50% Injectable 25 Gram(s) IV Push once  glucagon  Injectable 1 milliGRAM(s) IntraMuscular once  insulin glargine Injectable (LANTUS) 5 Unit(s) SubCutaneous at bedtime  insulin lispro (ADMELOG) corrective regimen sliding scale   SubCutaneous three times a day before meals  losartan 100 milliGRAM(s) Oral daily  NIFEdipine XL 60 milliGRAM(s) Oral daily  tamsulosin 0.4 milliGRAM(s) Oral at bedtime  vancomycin  IVPB 1000 milliGRAM(s) IV Intermittent every 24 hours    MEDICATIONS  (PRN):  dextrose Oral Gel 15 Gram(s) Oral once PRN Blood Glucose LESS THAN 70 milliGRAM(s)/deciliter      Allergies    No Known Allergies    Intolerances        REVIEW OF SYSTEMS:  CONSTITUTIONAL: No fever or chills  HEENT:  No headache, no sore throat  RESPIRATORY: No cough or shortness of breath  CARDIOVASCULAR: No chest pain or palpitations  GASTROINTESTINAL: No abd pain, nausea, vomiting, or diarrhea      Objective:  Vital Signs Last 24 Hrs  T(C): 37 (30 Aug 2024 05:00), Max: 37 (30 Aug 2024 05:00)  T(F): 98.6 (30 Aug 2024 05:00), Max: 98.6 (30 Aug 2024 05:00)  HR: 76 (30 Aug 2024 05:00) (76 - 88)  BP: 154/70 (30 Aug 2024 05:00) (144/72 - 175/83)  BP(mean): --  RR: 17 (30 Aug 2024 05:00) (17 - 19)  SpO2: 96% (30 Aug 2024 05:00) (96% - 100%)    Parameters below as of 30 Aug 2024 05:00  Patient On (Oxygen Delivery Method): room air        GENERAL: NAD, lying in bed comfortably  HEAD:  Normocephalic  EYES:  conjunctiva and sclera clear  ENT: Moist mucous membranes  NECK: Supple  CHEST/LUNG: Clear to auscultation bilaterally; No rales or rhonchi; no wheezing. Unlabored respirations  HEART: Regular rate and rhythm; S1S2+  ABDOMEN: Bowel sounds present; Soft, Nontender, Nondistended.   EXTREMITIES:  + distal Peripheral Pulses; Left  below the knee area of erythema and swelling, tender. ROM left knee limited due to pain   NERVOUS SYSTEM:  Alert & Oriented X3;  No gross focal deficits   MSK: moves all extremities  SKIN:  healed left calf wound with hyperpigmented discoloration.     LABS:                        11.5   8.52  )-----------( 159      ( 30 Aug 2024 08:10 )             33.6     30 Aug 2024 08:10    141    |  109    |  19     ----------------------------<  125    3.7     |  27     |  1.10     Ca    8.5        30 Aug 2024 08:10    TPro  6.3    /  Alb  2.8    /  TBili  0.5    /  DBili  x      /  AST  14     /  ALT  22     /  AlkPhos  84     30 Aug 2024 08:10    PT/INR - ( 29 Aug 2024 20:50 )   PT: 11.0 sec;   INR: 0.96 ratio         PTT - ( 29 Aug 2024 20:50 )  PTT:30.6 sec  Urinalysis Basic - ( 30 Aug 2024 08:10 )    Color: x / Appearance: x / SG: x / pH: x  Gluc: 125 mg/dL / Ketone: x  / Bili: x / Urobili: x   Blood: x / Protein: x / Nitrite: x   Leuk Esterase: x / RBC: x / WBC x   Sq Epi: x / Non Sq Epi: x / Bacteria: x      CAPILLARY BLOOD GLUCOSE      POCT Blood Glucose.: 130 mg/dL (30 Aug 2024 12:02)  POCT Blood Glucose.: 115 mg/dL (30 Aug 2024 07:43)        Culture - Joint (collected 08-30-24 @ 01:45)  Source: Joint Fl Synovial Fluid  Gram Stain (08-30-24 @ 12:57):    No polymorphonuclear cells seen per low power field    No organisms seen per oil power field        RADIOLOGY & ADDITIONAL TESTS:    Personally reviewed.     Consultant(s) Notes Reviewed:  [x] YES  [ ] NO    Plan of care discussed with patient /family Son on the phone ; all questions answered

## 2024-08-30 NOTE — CARE COORDINATION ASSESSMENT. - CAREGIVER ADDRESS
Can you please look into this? We ordered the MRI since there were abnormal findings on her EMG  Thanks  same as pt

## 2024-08-30 NOTE — PROGRESS NOTE ADULT - SUBJECTIVE AND OBJECTIVE BOX
Orthopedics      Patient seen and examined at bedside. Mandarin  used for translation. The patient is comfortable appearing. He states the pain in his left knee is relatively unchanged. Denies fevers/chills. Pain controlled. No n/v. No acute events overnight.    Vital Signs Last 24 Hrs  T(C): 37 (08-30-24 @ 05:00), Max: 37 (08-30-24 @ 05:00)  T(F): 98.6 (08-30-24 @ 05:00), Max: 98.6 (08-30-24 @ 05:00)  HR: 76 (08-30-24 @ 05:00) (76 - 88)  BP: 154/70 (08-30-24 @ 05:00) (144/72 - 175/83)  BP(mean): --  RR: 17 (08-30-24 @ 05:00) (17 - 19)  SpO2: 96% (08-30-24 @ 05:00) (96% - 100%)      PT/INR - ( 29 Aug 2024 20:50 )   PT: 11.0 sec;   INR: 0.96 ratio         PTT - ( 29 Aug 2024 20:50 )  PTT:30.6 sec    Exam:  Gen: NAD, resting comfortably  Left Knee  Skin intact with prior TKA incision visualized as well appearing, no dehiscence or wound drainage   Slight focal erythema about the medial to the distal incision  + small effusion  ROM 5-60  NTTP calves b/l  +EHL/FHL/TA/GS  SILT L2-S1  Compartments soft and compressible  + pulses palpable      A/P: 89yM with left knee pain/swelling s/p TKA in 2017  -FU AM labs, ESR 33/CRP pending  -CT L knee reviewed - small effusion with cellulitic component   -Pain control  -WBAT, PT/OT  -Left knee aspiration performed overnight, only gram stain/culture sent  -Appreciate ID recommendations, currently on meropenem/vanco  -DVT ppx per primary  -Elevation to extremity  -Medical management appreciated  -Will discuss with Dr. Wick and update plan as needed   Orthopedics      Patient seen and examined at bedside. Mandarin  used for translation. The patient is comfortable appearing. He states the pain in his left knee is relatively unchanged. Denies fevers/chills. Pain controlled. No n/v. No acute events overnight.    Vital Signs Last 24 Hrs  T(C): 37 (08-30-24 @ 05:00), Max: 37 (08-30-24 @ 05:00)  T(F): 98.6 (08-30-24 @ 05:00), Max: 98.6 (08-30-24 @ 05:00)  HR: 76 (08-30-24 @ 05:00) (76 - 88)  BP: 154/70 (08-30-24 @ 05:00) (144/72 - 175/83)  BP(mean): --  RR: 17 (08-30-24 @ 05:00) (17 - 19)  SpO2: 96% (08-30-24 @ 05:00) (96% - 100%)      PT/INR - ( 29 Aug 2024 20:50 )   PT: 11.0 sec;   INR: 0.96 ratio         PTT - ( 29 Aug 2024 20:50 )  PTT:30.6 sec    Exam:  Gen: NAD, resting comfortably  Left Knee  Skin intact with prior TKA incision visualized as well appearing, no dehiscence or wound drainage   Slight focal erythema about the medial to the distal incision  + small effusion  ROM 5-60  NTTP calves b/l  +EHL/FHL/TA/GS  SILT L2-S1  Compartments soft and compressible  + pulses palpable      A/P: 89yM with left knee pain/swelling s/p TKA in 2017  -Clinically appears to be cellulitis, r/o PJI  -FU AM labs, ESR 33/CRP pending  -CT L knee reviewed - small effusion with cellulitic component   -Pain control  -WBAT, PT/OT  -Left knee aspiration performed overnight, only gram stain/culture sent  -Keep NPO for now and hold DVT ppe, will likely require re-aspiration for cell count this morning  -Appreciate ID recommendations, currently on meropenem/vanco  -Elevation to extremity  -Medical management appreciated  -Will discuss with Dr. Wick and update plan as needed

## 2024-08-30 NOTE — CARE COORDINATION ASSESSMENT. - NSPASTMEDSURGHISTORY_GEN_ALL_CORE_FT
PAST MEDICAL & SURGICAL HISTORY:  Prostate CA  Radiation seed Implant      BPH (benign prostatic hyperplasia)      DM2 (diabetes mellitus, type 2)      HTN (hypertension)      HLD (hyperlipidemia)      Shingles  8 years ago      History of gastroesophageal reflux (GERD)      Gout      S/P cataract surgery  right eye

## 2024-08-30 NOTE — CARE COORDINATION ASSESSMENT. - NSCAREPROVIDERS_GEN_ALL_CORE_FT
CARE PROVIDERS:  Accepting Physician: Kareem Miranda  Administration: Matt Reyez  Administration: Sebastian Michele  Admitting: Kareem Miranda  Attending: Matt Reyez  Case Management: Som Lawrence  Case Management: Silvia Sanchez  Consultant: Han Cardenas  Consultant: Clover Wolff  Covering Team: Danyel Valverde  Covering Team: Leonie Otoole  ED ACP: Antolin Stafford ED Attending: Lucia Hennessy  ED Nurse: Destiny Leach  Nurse: Samantha Mccarty  Nurse: Meeta Ho  Nurse: Scott Sheldon  Nurse: Joe Mohan  Ordered: Doctor, Unknown  Outpatient Provider: Han Cardenas  Override: Pretty Ruth  Override: Cameron Rendon  Override: Samantha Mccarty  PCA/Nursing Assistant: Harmeet Ferrara  PCA/Nursing Assistant: Tanya Johns  Primary Team: Jagjit Ng  Primary Team: Bogdan Arizmendi  Primary Team: Lucia Hennessy  Primary Team: James Monson  Registered Dietitian: Kesha Sherwood  Team: [facility], Team  UR// Supp. Assoc.: Zeyad Rossi

## 2024-08-31 LAB
A1C WITH ESTIMATED AVERAGE GLUCOSE RESULT: 6.5 % — HIGH (ref 4–5.6)
ALBUMIN SERPL ELPH-MCNC: 2.6 G/DL — LOW (ref 3.3–5)
ALP SERPL-CCNC: 82 U/L — SIGNIFICANT CHANGE UP (ref 40–120)
ALT FLD-CCNC: 20 U/L — SIGNIFICANT CHANGE UP (ref 12–78)
ANION GAP SERPL CALC-SCNC: 5 MMOL/L — SIGNIFICANT CHANGE UP (ref 5–17)
AST SERPL-CCNC: 13 U/L — LOW (ref 15–37)
BASOPHILS # BLD AUTO: 0.02 K/UL — SIGNIFICANT CHANGE UP (ref 0–0.2)
BASOPHILS NFR BLD AUTO: 0.3 % — SIGNIFICANT CHANGE UP (ref 0–2)
BILIRUB SERPL-MCNC: 0.5 MG/DL — SIGNIFICANT CHANGE UP (ref 0.2–1.2)
BUN SERPL-MCNC: 27 MG/DL — HIGH (ref 7–23)
CALCIUM SERPL-MCNC: 8.6 MG/DL — SIGNIFICANT CHANGE UP (ref 8.5–10.1)
CHLORIDE SERPL-SCNC: 109 MMOL/L — HIGH (ref 96–108)
CO2 SERPL-SCNC: 26 MMOL/L — SIGNIFICANT CHANGE UP (ref 22–31)
CREAT SERPL-MCNC: 1.3 MG/DL — SIGNIFICANT CHANGE UP (ref 0.5–1.3)
EGFR: 53 ML/MIN/1.73M2 — LOW
EOSINOPHIL # BLD AUTO: 0.11 K/UL — SIGNIFICANT CHANGE UP (ref 0–0.5)
EOSINOPHIL NFR BLD AUTO: 1.6 % — SIGNIFICANT CHANGE UP (ref 0–6)
ESTIMATED AVERAGE GLUCOSE: 140 MG/DL — HIGH (ref 68–114)
GLUCOSE SERPL-MCNC: 133 MG/DL — HIGH (ref 70–99)
HCT VFR BLD CALC: 35.6 % — LOW (ref 39–50)
HGB BLD-MCNC: 11.5 G/DL — LOW (ref 13–17)
IMM GRANULOCYTES NFR BLD AUTO: 0.3 % — SIGNIFICANT CHANGE UP (ref 0–0.9)
LYMPHOCYTES # BLD AUTO: 1.37 K/UL — SIGNIFICANT CHANGE UP (ref 1–3.3)
LYMPHOCYTES # BLD AUTO: 20.4 % — SIGNIFICANT CHANGE UP (ref 13–44)
MCHC RBC-ENTMCNC: 32.3 GM/DL — SIGNIFICANT CHANGE UP (ref 32–36)
MCHC RBC-ENTMCNC: 32.6 PG — SIGNIFICANT CHANGE UP (ref 27–34)
MCV RBC AUTO: 100.8 FL — HIGH (ref 80–100)
MONOCYTES # BLD AUTO: 0.68 K/UL — SIGNIFICANT CHANGE UP (ref 0–0.9)
MONOCYTES NFR BLD AUTO: 10.1 % — SIGNIFICANT CHANGE UP (ref 2–14)
NEUTROPHILS # BLD AUTO: 4.5 K/UL — SIGNIFICANT CHANGE UP (ref 1.8–7.4)
NEUTROPHILS NFR BLD AUTO: 67.3 % — SIGNIFICANT CHANGE UP (ref 43–77)
NRBC # BLD: 0 /100 WBCS — SIGNIFICANT CHANGE UP (ref 0–0)
PLATELET # BLD AUTO: 163 K/UL — SIGNIFICANT CHANGE UP (ref 150–400)
POTASSIUM SERPL-MCNC: 4.1 MMOL/L — SIGNIFICANT CHANGE UP (ref 3.5–5.3)
POTASSIUM SERPL-SCNC: 4.1 MMOL/L — SIGNIFICANT CHANGE UP (ref 3.5–5.3)
PROT SERPL-MCNC: 6.4 G/DL — SIGNIFICANT CHANGE UP (ref 6–8.3)
RBC # BLD: 3.53 M/UL — LOW (ref 4.2–5.8)
RBC # FLD: 13.2 % — SIGNIFICANT CHANGE UP (ref 10.3–14.5)
SODIUM SERPL-SCNC: 140 MMOL/L — SIGNIFICANT CHANGE UP (ref 135–145)
VANCOMYCIN TROUGH SERPL-MCNC: 9.3 UG/ML — LOW (ref 10–20)
WBC # BLD: 6.7 K/UL — SIGNIFICANT CHANGE UP (ref 3.8–10.5)
WBC # FLD AUTO: 6.7 K/UL — SIGNIFICANT CHANGE UP (ref 3.8–10.5)

## 2024-08-31 PROCEDURE — 99233 SBSQ HOSP IP/OBS HIGH 50: CPT

## 2024-08-31 RX ORDER — LORATADINE 10 MG/1
10 CAPSULE, LIQUID FILLED ORAL DAILY
Refills: 0 | Status: DISCONTINUED | OUTPATIENT
Start: 2024-08-31 | End: 2024-09-04

## 2024-08-31 RX ORDER — INSULIN GLARGINE 100 [IU]/ML
7 INJECTION, SOLUTION SUBCUTANEOUS AT BEDTIME
Refills: 0 | Status: DISCONTINUED | OUTPATIENT
Start: 2024-08-31 | End: 2024-09-04

## 2024-08-31 RX ORDER — TRAMADOL HYDROCHLORIDE 200 MG/1
50 TABLET, EXTENDED RELEASE ORAL EVERY 6 HOURS
Refills: 0 | Status: DISCONTINUED | OUTPATIENT
Start: 2024-08-31 | End: 2024-09-04

## 2024-08-31 RX ORDER — ACETAMINOPHEN 325 MG/1
650 TABLET ORAL EVERY 6 HOURS
Refills: 0 | Status: DISCONTINUED | OUTPATIENT
Start: 2024-08-31 | End: 2024-09-04

## 2024-08-31 RX ORDER — TRAMADOL HYDROCHLORIDE 200 MG/1
25 TABLET, EXTENDED RELEASE ORAL EVERY 6 HOURS
Refills: 0 | Status: DISCONTINUED | OUTPATIENT
Start: 2024-08-31 | End: 2024-09-04

## 2024-08-31 RX ADMIN — Medication 5000 UNIT(S): at 06:12

## 2024-08-31 RX ADMIN — LORATADINE 10 MILLIGRAM(S): 10 CAPSULE, LIQUID FILLED ORAL at 12:36

## 2024-08-31 RX ADMIN — CEFEPIME 100 MILLIGRAM(S): 2 INJECTION, POWDER, FOR SOLUTION INTRAVENOUS at 01:02

## 2024-08-31 RX ADMIN — Medication 5000 UNIT(S): at 14:43

## 2024-08-31 RX ADMIN — Medication 1: at 07:51

## 2024-08-31 RX ADMIN — Medication 5000 UNIT(S): at 22:07

## 2024-08-31 RX ADMIN — Medication 81 MILLIGRAM(S): at 12:36

## 2024-08-31 RX ADMIN — LOSARTAN POTASSIUM 100 MILLIGRAM(S): 50 TABLET ORAL at 06:12

## 2024-08-31 RX ADMIN — TAMSULOSIN HYDROCHLORIDE 0.4 MILLIGRAM(S): 0.4 CAPSULE ORAL at 22:07

## 2024-08-31 RX ADMIN — Medication 1: at 12:35

## 2024-08-31 RX ADMIN — Medication 100 MILLIGRAM(S): at 12:36

## 2024-08-31 RX ADMIN — CEFEPIME 100 MILLIGRAM(S): 2 INJECTION, POWDER, FOR SOLUTION INTRAVENOUS at 14:43

## 2024-08-31 RX ADMIN — Medication 10 MILLIGRAM(S): at 22:07

## 2024-08-31 RX ADMIN — Medication 250 MILLIGRAM(S): at 22:08

## 2024-08-31 RX ADMIN — INSULIN GLARGINE 7 UNIT(S): 100 INJECTION, SOLUTION SUBCUTANEOUS at 22:08

## 2024-08-31 RX ADMIN — Medication 0: at 17:06

## 2024-08-31 RX ADMIN — NIFEDIPINE 60 MILLIGRAM(S): 60 TABLET, FILM COATED, EXTENDED RELEASE ORAL at 06:12

## 2024-08-31 NOTE — DIETITIAN INITIAL EVALUATION ADULT - ORAL INTAKE PTA/DIET HISTORY
patient seen sleeping. per CNA patient with good PO intake this AM. RD spoke to son on telephone. reports patient with good control of blood sugars PTA on Januvia. A1c 6.5% very compliant with diet from home also follows TUCKER diet PTA. food preferences noted. poor dentition noted and confirmed by family. will provide no tough meats and cut up chicken, patient prefers fish as well. likes soup, oatmeal, hot water for decaf tea, no food allergies  education deferred at this time familiar with diet from home

## 2024-08-31 NOTE — DIETITIAN INITIAL EVALUATION ADULT - PERTINENT LABORATORY DATA
08-31    140  |  109<H>  |  27<H>  ----------------------------<  133<H>  4.1   |  26  |  1.30    Ca    8.6      31 Aug 2024 06:50    TPro  6.4  /  Alb  2.6<L>  /  TBili  0.5  /  DBili  x   /  AST  13<L>  /  ALT  20  /  AlkPhos  82  08-31  POCT Blood Glucose.: 164 mg/dL (08-31-24 @ 11:57)  A1C with Estimated Average Glucose Result: 6.5 % (08-31-24 @ 06:50)

## 2024-08-31 NOTE — DIETITIAN INITIAL EVALUATION ADULT - ADD RECOMMEND
1. recommend MVI and Vit C 500mg BID 2. recommend glucerna BID left message with MD to activate diet

## 2024-08-31 NOTE — DIETITIAN INITIAL EVALUATION ADULT - PERTINENT MEDS FT
MEDICATIONS  (STANDING):  allopurinol 100 milliGRAM(s) Oral daily  aspirin enteric coated 81 milliGRAM(s) Oral daily  atorvastatin 10 milliGRAM(s) Oral at bedtime  cefepime   IVPB 2000 milliGRAM(s) IV Intermittent every 12 hours  dextrose 5%. 1000 milliLiter(s) (100 mL/Hr) IV Continuous <Continuous>  dextrose 5%. 1000 milliLiter(s) (50 mL/Hr) IV Continuous <Continuous>  dextrose 50% Injectable 25 Gram(s) IV Push once  dextrose 50% Injectable 12.5 Gram(s) IV Push once  dextrose 50% Injectable 25 Gram(s) IV Push once  glucagon  Injectable 1 milliGRAM(s) IntraMuscular once  heparin   Injectable 5000 Unit(s) SubCutaneous every 8 hours  insulin glargine Injectable (LANTUS) 5 Unit(s) SubCutaneous at bedtime  insulin lispro (ADMELOG) corrective regimen sliding scale   SubCutaneous three times a day before meals  loratadine 10 milliGRAM(s) Oral daily  losartan 100 milliGRAM(s) Oral daily  NIFEdipine XL 60 milliGRAM(s) Oral daily  tamsulosin 0.4 milliGRAM(s) Oral at bedtime  vancomycin  IVPB 1000 milliGRAM(s) IV Intermittent every 24 hours    MEDICATIONS  (PRN):  dextrose Oral Gel 15 Gram(s) Oral once PRN Blood Glucose LESS THAN 70 milliGRAM(s)/deciliter

## 2024-08-31 NOTE — PROGRESS NOTE ADULT - SUBJECTIVE AND OBJECTIVE BOX
Patient was seen and examined at bedside today. A mandarin  was utilized for translation purposes. He states that his knee feels much better and that the pain is gone. He has been able to bend his knee completely without pain. He denies any numbness, weakness or tingling. Denies fevers, chills, SOB, chest pain, nausea or vomiting.     LABS:                        11.5   8.52  )-----------( 159      ( 30 Aug 2024 08:10 )             33.6     08-30    141  |  109<H>  |  19  ----------------------------<  125<H>  3.7   |  27  |  1.10    Ca    8.5      30 Aug 2024 08:10    TPro  6.3  /  Alb  2.8<L>  /  TBili  0.5  /  DBili  x   /  AST  14<L>  /  ALT  22  /  AlkPhos  84  08-30    PT/INR - ( 29 Aug 2024 20:50 )   PT: 11.0 sec;   INR: 0.96 ratio         PTT - ( 29 Aug 2024 20:50 )  PTT:30.6 sec  Urinalysis Basic - ( 30 Aug 2024 08:10 )    Color: x / Appearance: x / SG: x / pH: x  Gluc: 125 mg/dL / Ketone: x  / Bili: x / Urobili: x   Blood: x / Protein: x / Nitrite: x   Leuk Esterase: x / RBC: x / WBC x   Sq Epi: x / Non Sq Epi: x / Bacteria: x        VITAL SIGNS:  T(C): 37.2 (08-31-24 @ 04:56), Max: 37.2 (08-31-24 @ 04:56)  HR: 76 (08-31-24 @ 04:56) (76 - 79)  BP: 127/68 (08-31-24 @ 04:56) (127/68 - 130/64)  RR: 17 (08-31-24 @ 04:56) (17 - 17)  SpO2: 98% (08-31-24 @ 04:56) (93% - 98%)    Physical Exam:  Gen: NAD, resting comfortably in bed  Left Knee:  Skin dry and intact with prior TKA incision visualized as well appearing, no dehiscence or wound drainage   Improving focal erythema about the medial to the distal incision  AROM 0-100 without pain  PROM 0-110  NTTP calves b/l  Motor: +TA/GSC/FHL/FHL  Sensory: + SILT L2-S1  Compartments soft and compressible, no calf tenderness  + DP    Assessment and Plan  Pt is an 88 yo M with L knee swelling and pain sp L TKA in 2017. He is overall doing much better and clinical signs of cellulitis are improving.    - WBAT, recommend PT/OT for dispo recommendations  - L knee aspirate cell count 427, not concerning for prosthetic joint infection  - DVT ppx with A81 and SQH  - Continue antibiotic regimen per primary team, clinical appearance of cellulitis appears to be improving  - Pain control PRN, ice PRN  - Encouraged incentive spirometer use  - Medical Management appreciated    Will discuss with Dr. Wick for any further recommendations.     Will discuss with  ***, and will advise for any changes to the plan.   Discussed with  ***, who agrees with the plan.  Patient was seen and examined at bedside today. A mandarin  was utilized for translation purposes. He states that his knee feels much better and that the pain is gone. He has been able to bend his knee completely without pain. He denies any numbness, weakness or tingling. Denies fevers, chills, SOB, chest pain, nausea or vomiting.     LABS:                        11.5   8.52  )-----------( 159      ( 30 Aug 2024 08:10 )             33.6     08-30    141  |  109<H>  |  19  ----------------------------<  125<H>  3.7   |  27  |  1.10    Ca    8.5      30 Aug 2024 08:10    TPro  6.3  /  Alb  2.8<L>  /  TBili  0.5  /  DBili  x   /  AST  14<L>  /  ALT  22  /  AlkPhos  84  08-30    PT/INR - ( 29 Aug 2024 20:50 )   PT: 11.0 sec;   INR: 0.96 ratio         PTT - ( 29 Aug 2024 20:50 )  PTT:30.6 sec  Urinalysis Basic - ( 30 Aug 2024 08:10 )    Color: x / Appearance: x / SG: x / pH: x  Gluc: 125 mg/dL / Ketone: x  / Bili: x / Urobili: x   Blood: x / Protein: x / Nitrite: x   Leuk Esterase: x / RBC: x / WBC x   Sq Epi: x / Non Sq Epi: x / Bacteria: x        VITAL SIGNS:  T(C): 37.2 (08-31-24 @ 04:56), Max: 37.2 (08-31-24 @ 04:56)  HR: 76 (08-31-24 @ 04:56) (76 - 79)  BP: 127/68 (08-31-24 @ 04:56) (127/68 - 130/64)  RR: 17 (08-31-24 @ 04:56) (17 - 17)  SpO2: 98% (08-31-24 @ 04:56) (93% - 98%)    Physical Exam:  Gen: NAD, resting comfortably in bed  Left Knee:  Skin dry and intact with prior TKA incision visualized as well appearing, no dehiscence or wound drainage   Improving focal erythema about the medial to the distal incision  AROM 0-100 without pain  PROM 0-110  NTTP calves b/l  Motor: +TA/GSC/FHL/FHL  Sensory: + SILT L2-S1  Compartments soft and compressible, no calf tenderness  + DP    Assessment and Plan  Pt is an 89 yo M with L knee swelling and pain sp L TKA in 2017. He is overall doing much better and clinical signs of cellulitis are improving.    - WBAT, recommend PT/OT for dispo recommendations  - L knee aspiration: , Crystals: PSGT; Cx NGTD; PCR neg;  - FU MR LLE  - DVT ppx with A81 and SQH  - Continue antibiotic regimen per primary team, clinical appearance of cellulitis appears to be improving  - Pain control PRN, ice PRN  - Encouraged incentive spirometer use  - Medical Management appreciated    Will discuss with Dr. Wick for any further recommendations.        Patient was seen and examined at bedside today. A mandarin  was utilized for translation purposes. He states that his knee feels much better and that the pain is gone. He has been able to bend his knee completely without pain. He denies any numbness, weakness or tingling. Denies fevers, chills, SOB, chest pain, nausea or vomiting.     LABS:                        11.5   8.52  )-----------( 159      ( 30 Aug 2024 08:10 )             33.6     08-30    141  |  109<H>  |  19  ----------------------------<  125<H>  3.7   |  27  |  1.10    Ca    8.5      30 Aug 2024 08:10    TPro  6.3  /  Alb  2.8<L>  /  TBili  0.5  /  DBili  x   /  AST  14<L>  /  ALT  22  /  AlkPhos  84  08-30    PT/INR - ( 29 Aug 2024 20:50 )   PT: 11.0 sec;   INR: 0.96 ratio         PTT - ( 29 Aug 2024 20:50 )  PTT:30.6 sec  Urinalysis Basic - ( 30 Aug 2024 08:10 )    Color: x / Appearance: x / SG: x / pH: x  Gluc: 125 mg/dL / Ketone: x  / Bili: x / Urobili: x   Blood: x / Protein: x / Nitrite: x   Leuk Esterase: x / RBC: x / WBC x   Sq Epi: x / Non Sq Epi: x / Bacteria: x        VITAL SIGNS:  T(C): 37.2 (08-31-24 @ 04:56), Max: 37.2 (08-31-24 @ 04:56)  HR: 76 (08-31-24 @ 04:56) (76 - 79)  BP: 127/68 (08-31-24 @ 04:56) (127/68 - 130/64)  RR: 17 (08-31-24 @ 04:56) (17 - 17)  SpO2: 98% (08-31-24 @ 04:56) (93% - 98%)    Physical Exam:  Gen: NAD, resting comfortably in bed  Left Knee:  Skin dry and intact with prior TKA incision visualized as well appearing, no dehiscence or wound drainage   Improving focal erythema about the medial to the distal incision  AROM 0-100 without pain  PROM 0-110  NTTP calves b/l  Motor: +TA/GSC/FHL/FHL  Sensory: + SILT L2-S1  Compartments soft and compressible, no calf tenderness  + DP    Assessment and Plan  Pt is an 89 yo M with L knee swelling and pain sp L TKA in 2017. He is overall doing much better and clinical signs of cellulitis are improving.    - WBAT, recommend PT/OT for dispo recommendations  - L knee aspiration: , Crystals: PSGT; Cx NGTD; PCR neg;  - FU MR LLE: Low suspicion for osteomyelitis. Based on negative PCR, low Cell count and positive crystals, likely pseudogout and low suspicion for OM, so likely can be managed outpatient.   - DVT ppx with A81 and SQH  - Continue antibiotic regimen per primary team, clinical appearance of cellulitis appears to be improving  - Pain control PRN, ice PRN  - Encouraged incentive spirometer use  - Medical Management appreciated    Will discuss with Dr. Wick for any further recommendations.     No acute orthopaedic surgical intervention indicated at this time. This patient is orthopaedically stable for discharge.   Patient to follow up with Dr. Wick as an outpatient for further evaluation and management.   All of the patient's questions and concerns were answered and addressed.

## 2024-08-31 NOTE — PROGRESS NOTE ADULT - SUBJECTIVE AND OBJECTIVE BOX
Patient is a 89y old  Male who presents with a chief complaint of Knee pain     (31 Aug 2024 12:14)    Patient seen in follow up for CKD 3.         PAST MEDICAL HISTORY:  HTN (hypertension)    DM2 (diabetes mellitus, type 2)    BPH (benign prostatic hyperplasia)    Prostate CA    HLD (hyperlipidemia)    Gout    History of gastroesophageal reflux (GERD)    Shingles      MEDICATIONS  (STANDING):  allopurinol 100 milliGRAM(s) Oral daily  aspirin enteric coated 81 milliGRAM(s) Oral daily  atorvastatin 10 milliGRAM(s) Oral at bedtime  cefepime   IVPB 2000 milliGRAM(s) IV Intermittent every 12 hours  dextrose 5%. 1000 milliLiter(s) (100 mL/Hr) IV Continuous <Continuous>  dextrose 5%. 1000 milliLiter(s) (50 mL/Hr) IV Continuous <Continuous>  dextrose 50% Injectable 25 Gram(s) IV Push once  dextrose 50% Injectable 25 Gram(s) IV Push once  dextrose 50% Injectable 12.5 Gram(s) IV Push once  glucagon  Injectable 1 milliGRAM(s) IntraMuscular once  heparin   Injectable 5000 Unit(s) SubCutaneous every 8 hours  insulin glargine Injectable (LANTUS) 5 Unit(s) SubCutaneous at bedtime  insulin lispro (ADMELOG) corrective regimen sliding scale   SubCutaneous three times a day before meals  loratadine 10 milliGRAM(s) Oral daily  losartan 100 milliGRAM(s) Oral daily  NIFEdipine XL 60 milliGRAM(s) Oral daily  tamsulosin 0.4 milliGRAM(s) Oral at bedtime  vancomycin  IVPB 1000 milliGRAM(s) IV Intermittent every 24 hours    MEDICATIONS  (PRN):  dextrose Oral Gel 15 Gram(s) Oral once PRN Blood Glucose LESS THAN 70 milliGRAM(s)/deciliter    T(C): 36.5 (08-31-24 @ 12:00), Max: 37.2 (08-31-24 @ 04:56)  HR: 81 (08-31-24 @ 12:00) (76 - 87)  BP: 119/74 (08-31-24 @ 12:00) (119/74 - 175/83)  RR: 18 (08-31-24 @ 12:00) (17 - 18)  SpO2: 95% (08-31-24 @ 12:00) (93% - 99%)  Wt(kg): --  I&O's Detail    30 Aug 2024 07:01  -  31 Aug 2024 07:00  --------------------------------------------------------  IN:  Total IN: 0 mL    OUT:    Voided (mL): 1300 mL  Total OUT: 1300 mL    Total NET: -1300 mL          PHYSICAL EXAM:  General: No distress  Respiratory: b/l air entry  Cardiovascular: S1 S2  Gastrointestinal: soft  Extremities:  no edema                              11.5   6.70  )-----------( 163      ( 31 Aug 2024 06:50 )             35.6     08-31    140  |  109<H>  |  27<H>  ----------------------------<  133<H>  4.1   |  26  |  1.30    Ca    8.6      31 Aug 2024 06:50    TPro  6.4  /  Alb  2.6<L>  /  TBili  0.5  /  DBili  x   /  AST  13<L>  /  ALT  20  /  AlkPhos  82  08-31        LIVER FUNCTIONS - ( 31 Aug 2024 06:50 )  Alb: 2.6 g/dL / Pro: 6.4 g/dL / ALK PHOS: 82 U/L / ALT: 20 U/L / AST: 13 U/L / GGT: x           Urinalysis Basic - ( 31 Aug 2024 06:50 )    Color: x / Appearance: x / SG: x / pH: x  Gluc: 133 mg/dL / Ketone: x  / Bili: x / Urobili: x   Blood: x / Protein: x / Nitrite: x   Leuk Esterase: x / RBC: x / WBC x   Sq Epi: x / Non Sq Epi: x / Bacteria: x        Sodium, Serum: 140 (08-31 @ 06:50)  Sodium, Serum: 141 (08-30 @ 08:10)  Sodium, Serum: 138 (08-29 @ 20:50)    Creatinine, Serum: 1.30 (08-31 @ 06:50)  Creatinine, Serum: 1.10 (08-30 @ 08:10)  Creatinine, Serum: 1.50 (08-29 @ 20:50)    Potassium, Serum: 4.1 (08-31 @ 06:50)  Potassium, Serum: 3.7 (08-30 @ 08:10)  Potassium, Serum: 3.9 (08-29 @ 20:50)    Hemoglobin: 11.5 (08-31 @ 06:50)  Hemoglobin: 11.5 (08-30 @ 08:10)  Hemoglobin: 12.4 (08-29 @ 20:50)

## 2024-08-31 NOTE — PROGRESS NOTE ADULT - SUBJECTIVE AND OBJECTIVE BOX
Patient is a 89y old  Male who presents with a chief complaint of IV Abx (31 Aug 2024 13:03)      Subjective:  INTERVAL HPI/OVERNIGHT EVENTS: Patient seen and examined at bedside.  Patient c/o generalized itchiness, which is chronic and recurrent to him. still has LLE pain   MEDICATIONS  (STANDING):  allopurinol 100 milliGRAM(s) Oral daily  aspirin enteric coated 81 milliGRAM(s) Oral daily  atorvastatin 10 milliGRAM(s) Oral at bedtime  cefepime   IVPB 2000 milliGRAM(s) IV Intermittent every 12 hours  dextrose 5%. 1000 milliLiter(s) (50 mL/Hr) IV Continuous <Continuous>  dextrose 5%. 1000 milliLiter(s) (100 mL/Hr) IV Continuous <Continuous>  dextrose 50% Injectable 25 Gram(s) IV Push once  dextrose 50% Injectable 12.5 Gram(s) IV Push once  dextrose 50% Injectable 25 Gram(s) IV Push once  glucagon  Injectable 1 milliGRAM(s) IntraMuscular once  heparin   Injectable 5000 Unit(s) SubCutaneous every 8 hours  insulin glargine Injectable (LANTUS) 5 Unit(s) SubCutaneous at bedtime  insulin lispro (ADMELOG) corrective regimen sliding scale   SubCutaneous three times a day before meals  loratadine 10 milliGRAM(s) Oral daily  losartan 100 milliGRAM(s) Oral daily  NIFEdipine XL 60 milliGRAM(s) Oral daily  tamsulosin 0.4 milliGRAM(s) Oral at bedtime  vancomycin  IVPB 1000 milliGRAM(s) IV Intermittent every 24 hours    MEDICATIONS  (PRN):  dextrose Oral Gel 15 Gram(s) Oral once PRN Blood Glucose LESS THAN 70 milliGRAM(s)/deciliter      Allergies    No Known Allergies    Intolerances        REVIEW OF SYSTEMS:  CONSTITUTIONAL: No fever or chills  HEENT:  No headache, no sore throat  RESPIRATORY: No cough or shortness of breath  CARDIOVASCULAR: No chest pain or palpitations  GASTROINTESTINAL: No abd pain, nausea, vomiting, or diarrhea      Objective:  Vital Signs Last 24 Hrs  T(C): 36.5 (31 Aug 2024 12:00), Max: 37.2 (31 Aug 2024 04:56)  T(F): 97.7 (31 Aug 2024 12:00), Max: 99 (31 Aug 2024 04:56)  HR: 81 (31 Aug 2024 12:00) (76 - 81)  BP: 119/74 (31 Aug 2024 12:00) (119/74 - 130/64)  BP(mean): --  RR: 18 (31 Aug 2024 12:00) (17 - 18)  SpO2: 95% (31 Aug 2024 12:00) (93% - 98%)    Parameters below as of 31 Aug 2024 12:00  Patient On (Oxygen Delivery Method): room air        GENERAL: NAD, lying in bed comfortably  HEAD:  Normocephalic  EYES:  conjunctiva and sclera clear  ENT: Moist mucous membranes  NECK: Supple  CHEST/LUNG: Clear to auscultation bilaterally; No rales or rhonchi; no wheezing. Unlabored respirations  HEART: Regular rate and rhythm; S1S2+  ABDOMEN: Bowel sounds present; Soft, Nontender, Nondistended.   EXTREMITIES:  + distal Peripheral Pulses;  left LLE redness improved   NERVOUS SYSTEM:  Alert & Oriented X3;  No gross focal deficits   MSK: moves all extremities  SKIN: No rashes     LABS:                        11.5   6.70  )-----------( 163      ( 31 Aug 2024 06:50 )             35.6     31 Aug 2024 06:50    140    |  109    |  27     ----------------------------<  133    4.1     |  26     |  1.30     Ca    8.6        31 Aug 2024 06:50    TPro  6.4    /  Alb  2.6    /  TBili  0.5    /  DBili  x      /  AST  13     /  ALT  20     /  AlkPhos  82     31 Aug 2024 06:50    PT/INR - ( 29 Aug 2024 20:50 )   PT: 11.0 sec;   INR: 0.96 ratio         PTT - ( 29 Aug 2024 20:50 )  PTT:30.6 sec  Urinalysis Basic - ( 31 Aug 2024 06:50 )    Color: x / Appearance: x / SG: x / pH: x  Gluc: 133 mg/dL / Ketone: x  / Bili: x / Urobili: x   Blood: x / Protein: x / Nitrite: x   Leuk Esterase: x / RBC: x / WBC x   Sq Epi: x / Non Sq Epi: x / Bacteria: x      CAPILLARY BLOOD GLUCOSE      POCT Blood Glucose.: 164 mg/dL (31 Aug 2024 11:57)  POCT Blood Glucose.: 189 mg/dL (31 Aug 2024 07:32)  POCT Blood Glucose.: 127 mg/dL (30 Aug 2024 21:44)  POCT Blood Glucose.: 96 mg/dL (30 Aug 2024 16:55)        Culture - Joint (collected 08-30-24 @ 01:45)  Source: Joint Fl Synovial Fluid  Gram Stain (08-30-24 @ 12:57):    No polymorphonuclear cells seen per low power field    No organisms seen per oil power field  Preliminary Report (08-31-24 @ 09:08):    No growth to date.    Culture - Blood (collected 08-29-24 @ 20:50)  Source: .Blood Blood-Peripheral  Preliminary Report (08-31-24 @ 01:03):    No growth at 24 hours    Culture - Blood (collected 08-29-24 @ 20:45)  Source: .Blood Blood-Peripheral  Preliminary Report (08-31-24 @ 01:03):    No growth at 24 hours        RADIOLOGY & ADDITIONAL TESTS:    Personally reviewed.     Consultant(s) Notes Reviewed:  [x] YES  [ ] NO    Plan of care discussed with patient ; all questions answered

## 2024-08-31 NOTE — DIETITIAN INITIAL EVALUATION ADULT - NS FNS DIET ORDER
Diet, DASH/TLC:   Sodium & Cholesterol Restricted  Consistent Carbohydrate {No Snacks} (08-30-24 @ 15:33)

## 2024-08-31 NOTE — DIETITIAN INITIAL EVALUATION ADULT - OTHER INFO
History of Present Illness:   89 Y.O.M with PMH of DM type 2 insulin dependent , HTN, HLD, BPH and gout   presented to ED due to L knee pain that started about 2 days ago and noticed that it was getting swollen and warm to touch   weight 155# which is UBW per son  LTKA 2017   5/29 BM

## 2024-09-01 DIAGNOSIS — L03.116 CELLULITIS OF LEFT LOWER LIMB: ICD-10-CM

## 2024-09-01 DIAGNOSIS — I10 ESSENTIAL (PRIMARY) HYPERTENSION: ICD-10-CM

## 2024-09-01 DIAGNOSIS — E11.9 TYPE 2 DIABETES MELLITUS WITHOUT COMPLICATIONS: ICD-10-CM

## 2024-09-01 DIAGNOSIS — E87.20 ACIDOSIS, UNSPECIFIED: ICD-10-CM

## 2024-09-01 LAB
ANION GAP SERPL CALC-SCNC: 6 MMOL/L — SIGNIFICANT CHANGE UP (ref 5–17)
BUN SERPL-MCNC: 27 MG/DL — HIGH (ref 7–23)
CALCIUM SERPL-MCNC: 9.1 MG/DL — SIGNIFICANT CHANGE UP (ref 8.5–10.1)
CHLORIDE SERPL-SCNC: 109 MMOL/L — HIGH (ref 96–108)
CO2 SERPL-SCNC: 24 MMOL/L — SIGNIFICANT CHANGE UP (ref 22–31)
CREAT SERPL-MCNC: 1.3 MG/DL — SIGNIFICANT CHANGE UP (ref 0.5–1.3)
EGFR: 53 ML/MIN/1.73M2 — LOW
GLUCOSE SERPL-MCNC: 140 MG/DL — HIGH (ref 70–99)
HCT VFR BLD CALC: 37.9 % — LOW (ref 39–50)
HGB BLD-MCNC: 12.4 G/DL — LOW (ref 13–17)
MCHC RBC-ENTMCNC: 32.4 PG — SIGNIFICANT CHANGE UP (ref 27–34)
MCHC RBC-ENTMCNC: 32.7 GM/DL — SIGNIFICANT CHANGE UP (ref 32–36)
MCV RBC AUTO: 99 FL — SIGNIFICANT CHANGE UP (ref 80–100)
NRBC # BLD: 0 /100 WBCS — SIGNIFICANT CHANGE UP (ref 0–0)
PLATELET # BLD AUTO: 184 K/UL — SIGNIFICANT CHANGE UP (ref 150–400)
POTASSIUM SERPL-MCNC: 4.1 MMOL/L — SIGNIFICANT CHANGE UP (ref 3.5–5.3)
POTASSIUM SERPL-SCNC: 4.1 MMOL/L — SIGNIFICANT CHANGE UP (ref 3.5–5.3)
RBC # BLD: 3.83 M/UL — LOW (ref 4.2–5.8)
RBC # FLD: 13.2 % — SIGNIFICANT CHANGE UP (ref 10.3–14.5)
SODIUM SERPL-SCNC: 139 MMOL/L — SIGNIFICANT CHANGE UP (ref 135–145)
WBC # BLD: 7.99 K/UL — SIGNIFICANT CHANGE UP (ref 3.8–10.5)
WBC # FLD AUTO: 7.99 K/UL — SIGNIFICANT CHANGE UP (ref 3.8–10.5)

## 2024-09-01 PROCEDURE — 73718 MRI LOWER EXTREMITY W/O DYE: CPT | Mod: 26,LT

## 2024-09-01 PROCEDURE — 99233 SBSQ HOSP IP/OBS HIGH 50: CPT

## 2024-09-01 RX ADMIN — INSULIN GLARGINE 7 UNIT(S): 100 INJECTION, SOLUTION SUBCUTANEOUS at 22:24

## 2024-09-01 RX ADMIN — LOSARTAN POTASSIUM 100 MILLIGRAM(S): 50 TABLET ORAL at 06:32

## 2024-09-01 RX ADMIN — LORATADINE 10 MILLIGRAM(S): 10 CAPSULE, LIQUID FILLED ORAL at 11:40

## 2024-09-01 RX ADMIN — Medication 5000 UNIT(S): at 14:22

## 2024-09-01 RX ADMIN — CEFEPIME 100 MILLIGRAM(S): 2 INJECTION, POWDER, FOR SOLUTION INTRAVENOUS at 14:21

## 2024-09-01 RX ADMIN — Medication 5000 UNIT(S): at 06:33

## 2024-09-01 RX ADMIN — Medication 81 MILLIGRAM(S): at 11:41

## 2024-09-01 RX ADMIN — Medication 250 MILLIGRAM(S): at 22:12

## 2024-09-01 RX ADMIN — Medication 10 MILLIGRAM(S): at 22:12

## 2024-09-01 RX ADMIN — ACETAMINOPHEN 650 MILLIGRAM(S): 325 TABLET ORAL at 22:27

## 2024-09-01 RX ADMIN — ACETAMINOPHEN 650 MILLIGRAM(S): 325 TABLET ORAL at 23:15

## 2024-09-01 RX ADMIN — ACETAMINOPHEN 650 MILLIGRAM(S): 325 TABLET ORAL at 04:08

## 2024-09-01 RX ADMIN — Medication 3 MILLIGRAM(S): at 22:12

## 2024-09-01 RX ADMIN — CEFEPIME 100 MILLIGRAM(S): 2 INJECTION, POWDER, FOR SOLUTION INTRAVENOUS at 02:43

## 2024-09-01 RX ADMIN — Medication 100 MILLIGRAM(S): at 11:40

## 2024-09-01 RX ADMIN — Medication 5000 UNIT(S): at 22:13

## 2024-09-01 RX ADMIN — NIFEDIPINE 60 MILLIGRAM(S): 60 TABLET, FILM COATED, EXTENDED RELEASE ORAL at 06:32

## 2024-09-01 RX ADMIN — ACETAMINOPHEN 650 MILLIGRAM(S): 325 TABLET ORAL at 04:50

## 2024-09-01 RX ADMIN — TAMSULOSIN HYDROCHLORIDE 0.4 MILLIGRAM(S): 0.4 CAPSULE ORAL at 22:13

## 2024-09-01 RX ADMIN — Medication 2: at 11:41

## 2024-09-01 NOTE — PROGRESS NOTE ADULT - SUBJECTIVE AND OBJECTIVE BOX
Patient is a 89y old  Male who presents with a chief complaint of IV Abx (31 Aug 2024 13:48)      Subjective:  INTERVAL HPI/OVERNIGHT EVENTS: Patient seen and examined at bedside.  Patient c/o LLE pain last night, helped with tylenol.   MEDICATIONS  (STANDING):  allopurinol 100 milliGRAM(s) Oral daily  aspirin enteric coated 81 milliGRAM(s) Oral daily  atorvastatin 10 milliGRAM(s) Oral at bedtime  cefepime   IVPB 2000 milliGRAM(s) IV Intermittent every 12 hours  dextrose 5%. 1000 milliLiter(s) (50 mL/Hr) IV Continuous <Continuous>  dextrose 5%. 1000 milliLiter(s) (100 mL/Hr) IV Continuous <Continuous>  dextrose 50% Injectable 25 Gram(s) IV Push once  dextrose 50% Injectable 12.5 Gram(s) IV Push once  dextrose 50% Injectable 25 Gram(s) IV Push once  glucagon  Injectable 1 milliGRAM(s) IntraMuscular once  heparin   Injectable 5000 Unit(s) SubCutaneous every 8 hours  insulin glargine Injectable (LANTUS) 7 Unit(s) SubCutaneous at bedtime  insulin lispro (ADMELOG) corrective regimen sliding scale   SubCutaneous three times a day before meals  loratadine 10 milliGRAM(s) Oral daily  losartan 100 milliGRAM(s) Oral daily  NIFEdipine XL 60 milliGRAM(s) Oral daily  tamsulosin 0.4 milliGRAM(s) Oral at bedtime  vancomycin  IVPB 1000 milliGRAM(s) IV Intermittent every 24 hours    MEDICATIONS  (PRN):  acetaminophen     Tablet .. 650 milliGRAM(s) Oral every 6 hours PRN Mild Pain (1 - 3)  dextrose Oral Gel 15 Gram(s) Oral once PRN Blood Glucose LESS THAN 70 milliGRAM(s)/deciliter  traMADol 25 milliGRAM(s) Oral every 6 hours PRN Moderate Pain (4 - 6)  traMADol 50 milliGRAM(s) Oral every 6 hours PRN Severe Pain (7 - 10)      Allergies    No Known Allergies    Intolerances        REVIEW OF SYSTEMS:  CONSTITUTIONAL: No fever or chills  HEENT:  No headache, no sore throat  RESPIRATORY: No cough or shortness of breath  CARDIOVASCULAR: No chest pain or palpitations  GASTROINTESTINAL: No abd pain, nausea, vomiting, or diarrhea      Objective:  Vital Signs Last 24 Hrs  T(C): 36.3 (01 Sep 2024 12:04), Max: 37.2 (31 Aug 2024 20:30)  T(F): 97.4 (01 Sep 2024 12:04), Max: 99 (31 Aug 2024 20:30)  HR: 85 (01 Sep 2024 12:04) (71 - 85)  BP: 118/70 (01 Sep 2024 12:04) (118/70 - 145/71)  BP(mean): --  RR: 18 (01 Sep 2024 12:04) (18 - 18)  SpO2: 91% (01 Sep 2024 12:04) (91% - 95%)    Parameters below as of 01 Sep 2024 12:04  Patient On (Oxygen Delivery Method): room air        GENERAL: NAD, lying in bed comfortably  HEAD:  Normocephalic  EYES:  conjunctiva and sclera clear  ENT: Moist mucous membranes  NECK: Supple  CHEST/LUNG: Clear to auscultation bilaterally; No rales or rhonchi; no wheezing. Unlabored respirations  HEART: Regular rate and rhythm; S1S2+  ABDOMEN: Bowel sounds present; Soft, Nontender, Nondistended.   EXTREMITIES:  + distal Peripheral Pulses;  left below knee area of swelling and redness remain but improved  NERVOUS SYSTEM:  Alert & Oriented X3;  No gross focal deficits   MSK: moves all extremities  SKIN: No rashes     LABS:                        12.4   7.99  )-----------( 184      ( 01 Sep 2024 07:17 )             37.9     01 Sep 2024 07:17    139    |  109    |  27     ----------------------------<  140    4.1     |  24     |  1.30     Ca    9.1        01 Sep 2024 07:17        Urinalysis Basic - ( 01 Sep 2024 07:17 )    Color: x / Appearance: x / SG: x / pH: x  Gluc: 140 mg/dL / Ketone: x  / Bili: x / Urobili: x   Blood: x / Protein: x / Nitrite: x   Leuk Esterase: x / RBC: x / WBC x   Sq Epi: x / Non Sq Epi: x / Bacteria: x      CAPILLARY BLOOD GLUCOSE      POCT Blood Glucose.: 204 mg/dL (01 Sep 2024 11:34)  POCT Blood Glucose.: 131 mg/dL (01 Sep 2024 07:50)  POCT Blood Glucose.: 144 mg/dL (31 Aug 2024 21:27)  POCT Blood Glucose.: 137 mg/dL (31 Aug 2024 16:50)        Culture - Joint (collected 08-30-24 @ 01:45)  Source: Joint Fl Synovial Fluid  Gram Stain (08-30-24 @ 12:57):    No polymorphonuclear cells seen per low power field    No organisms seen per oil power field  Preliminary Report (08-31-24 @ 09:08):    No growth to date.    Culture - Blood (collected 08-29-24 @ 20:50)  Source: .Blood Blood-Peripheral  Preliminary Report (09-01-24 @ 01:02):    No growth at 48 Hours    Culture - Blood (collected 08-29-24 @ 20:45)  Source: .Blood Blood-Peripheral  Preliminary Report (09-01-24 @ 01:02):    No growth at 48 Hours        RADIOLOGY & ADDITIONAL TESTS:    Personally reviewed.     Consultant(s) Notes Reviewed:  [x] YES  [ ] NO    Plan of care discussed with patient /family karissa Parrish on phone.  all questions answered

## 2024-09-02 ENCOUNTER — TRANSCRIPTION ENCOUNTER (OUTPATIENT)
Age: 89
End: 2024-09-02

## 2024-09-02 LAB
ANION GAP SERPL CALC-SCNC: 8 MMOL/L — SIGNIFICANT CHANGE UP (ref 5–17)
BUN SERPL-MCNC: 27 MG/DL — HIGH (ref 7–23)
CALCIUM SERPL-MCNC: 9.3 MG/DL — SIGNIFICANT CHANGE UP (ref 8.5–10.1)
CHLORIDE SERPL-SCNC: 110 MMOL/L — HIGH (ref 96–108)
CO2 SERPL-SCNC: 25 MMOL/L — SIGNIFICANT CHANGE UP (ref 22–31)
CREAT SERPL-MCNC: 1.3 MG/DL — SIGNIFICANT CHANGE UP (ref 0.5–1.3)
EGFR: 53 ML/MIN/1.73M2 — LOW
GLUCOSE SERPL-MCNC: 121 MG/DL — HIGH (ref 70–99)
HCT VFR BLD CALC: 35.4 % — LOW (ref 39–50)
HGB BLD-MCNC: 11.2 G/DL — LOW (ref 13–17)
MCHC RBC-ENTMCNC: 31.6 GM/DL — LOW (ref 32–36)
MCHC RBC-ENTMCNC: 32.3 PG — SIGNIFICANT CHANGE UP (ref 27–34)
MCV RBC AUTO: 102 FL — HIGH (ref 80–100)
NRBC # BLD: 0 /100 WBCS — SIGNIFICANT CHANGE UP (ref 0–0)
PLATELET # BLD AUTO: 174 K/UL — SIGNIFICANT CHANGE UP (ref 150–400)
POTASSIUM SERPL-MCNC: 4.2 MMOL/L — SIGNIFICANT CHANGE UP (ref 3.5–5.3)
POTASSIUM SERPL-SCNC: 4.2 MMOL/L — SIGNIFICANT CHANGE UP (ref 3.5–5.3)
RBC # BLD: 3.47 M/UL — LOW (ref 4.2–5.8)
RBC # FLD: 13.1 % — SIGNIFICANT CHANGE UP (ref 10.3–14.5)
SODIUM SERPL-SCNC: 143 MMOL/L — SIGNIFICANT CHANGE UP (ref 135–145)
VANCOMYCIN TROUGH SERPL-MCNC: 9 UG/ML — LOW (ref 10–20)
WBC # BLD: 6.59 K/UL — SIGNIFICANT CHANGE UP (ref 3.8–10.5)
WBC # FLD AUTO: 6.59 K/UL — SIGNIFICANT CHANGE UP (ref 3.8–10.5)

## 2024-09-02 PROCEDURE — 99232 SBSQ HOSP IP/OBS MODERATE 35: CPT

## 2024-09-02 PROCEDURE — 99233 SBSQ HOSP IP/OBS HIGH 50: CPT

## 2024-09-02 RX ORDER — CEFAZOLIN SODIUM 2 G/100ML
2000 INJECTION, SOLUTION INTRAVENOUS EVERY 12 HOURS
Refills: 0 | Status: COMPLETED | OUTPATIENT
Start: 2024-09-02 | End: 2024-09-03

## 2024-09-02 RX ADMIN — Medication 5000 UNIT(S): at 05:32

## 2024-09-02 RX ADMIN — Medication 1: at 11:51

## 2024-09-02 RX ADMIN — CEFEPIME 100 MILLIGRAM(S): 2 INJECTION, POWDER, FOR SOLUTION INTRAVENOUS at 13:31

## 2024-09-02 RX ADMIN — Medication 5000 UNIT(S): at 13:30

## 2024-09-02 RX ADMIN — Medication 100 MILLIGRAM(S): at 11:51

## 2024-09-02 RX ADMIN — INSULIN GLARGINE 7 UNIT(S): 100 INJECTION, SOLUTION SUBCUTANEOUS at 21:49

## 2024-09-02 RX ADMIN — CEFEPIME 100 MILLIGRAM(S): 2 INJECTION, POWDER, FOR SOLUTION INTRAVENOUS at 01:01

## 2024-09-02 RX ADMIN — Medication 81 MILLIGRAM(S): at 11:51

## 2024-09-02 RX ADMIN — TAMSULOSIN HYDROCHLORIDE 0.4 MILLIGRAM(S): 0.4 CAPSULE ORAL at 21:49

## 2024-09-02 RX ADMIN — Medication 10 MILLIGRAM(S): at 21:49

## 2024-09-02 RX ADMIN — Medication 5000 UNIT(S): at 21:50

## 2024-09-02 RX ADMIN — LOSARTAN POTASSIUM 100 MILLIGRAM(S): 50 TABLET ORAL at 05:32

## 2024-09-02 RX ADMIN — CEFAZOLIN SODIUM 100 MILLIGRAM(S): 2 INJECTION, SOLUTION INTRAVENOUS at 17:41

## 2024-09-02 RX ADMIN — LORATADINE 10 MILLIGRAM(S): 10 CAPSULE, LIQUID FILLED ORAL at 11:51

## 2024-09-02 NOTE — PHYSICAL THERAPY INITIAL EVALUATION ADULT - PATIENT PROFILE REVIEW, REHAB EVAL
PT orders received: ambulate as tolerated. Consult with RN Feng , pt may participate in PT evaluation./yes

## 2024-09-02 NOTE — DISCHARGE NOTE PROVIDER - ATTENDING DISCHARGE PHYSICAL EXAMINATION:
Vitals: T: 97.3  P: 80  BP:  105/64  RR: 18  SpO2: 98%RA  GENERAL: NAD  HEENT:  AT/NC, anicteric, moist mucous membranes, EOMI, PERRL, no lid-lag, conjunctiva and sclera clear, hard of hearing  CHEST/LUNG:  CTA b/l, no rales, wheezes, or rhonchi,  normal respiratory effort, no intercostal retractions  HEART:  RRR, S1, S2, no murmurs; no pitting edema  ABDOMEN:  BS+, soft, nontender, nondistended; No HSM  MSK/EXTREMITIES: 2+ peripheral pulses, no clubbing or cyanosis  NERVOUS SYSTEM: answers questions and follows commands appropriately, A&Ox3 grossly moves all extremities   PSYCH: Appropriate affect, Alert & Awake; Good judgement

## 2024-09-02 NOTE — PROGRESS NOTE ADULT - SUBJECTIVE AND OBJECTIVE BOX
[facility]  INFECTIOUS DISEASES - Megan Mendez, [place] [address]  Tel: [phone]     Fax: [phone]  =======================================================    MARTHA MELCHOR [id number]    Follow up: No fevers. Seen walking with PT using walker in the room.    Allergies:  No Known Allergies      Antibiotics:  acetaminophen     Tablet .. 650 milliGRAM(s) Oral every 6 hours PRN  allopurinol 100 milliGRAM(s) Oral daily  aspirin enteric coated 81 milliGRAM(s) Oral daily  atorvastatin 10 milliGRAM(s) Oral at bedtime  cefepime   IVPB 2000 milliGRAM(s) IV Intermittent every 12 hours  dextrose 5%. 1000 milliLiter(s) IV Continuous <Continuous>  dextrose 5%. 1000 milliLiter(s) IV Continuous <Continuous>  dextrose 50% Injectable 25 Gram(s) IV Push once  dextrose 50% Injectable 25 Gram(s) IV Push once  dextrose 50% Injectable 12.5 Gram(s) IV Push once  dextrose Oral Gel 15 Gram(s) Oral once PRN  glucagon  Injectable 1 milliGRAM(s) IntraMuscular once  heparin   Injectable 5000 Unit(s) SubCutaneous every 8 hours  insulin glargine Injectable (LANTUS) 7 Unit(s) SubCutaneous at bedtime  insulin lispro (ADMELOG) corrective regimen sliding scale   SubCutaneous three times a day before meals  loratadine 10 milliGRAM(s) Oral daily  losartan 100 milliGRAM(s) Oral daily  NIFEdipine XL 60 milliGRAM(s) Oral daily  tamsulosin 0.4 milliGRAM(s) Oral at bedtime  traMADol 25 milliGRAM(s) Oral every 6 hours PRN  traMADol 50 milliGRAM(s) Oral every 6 hours PRN  vancomycin  IVPB 1000 milliGRAM(s) IV Intermittent every 24 hours       REVIEW OF SYSTEMS:  unable to obtain, very [place]     Physical Exam:  ICU Vital Signs Last 24 Hrs  T(C): 36.3 (02 Sep 2024 12:03), Max: 36.7 (01 Sep 2024 20:07)  T(F): 97.4 (02 Sep 2024 12:03), Max: 98.1 (01 Sep 2024 20:07)  HR: 98 (02 Sep 2024 12:03) (65 - 98)  BP: 114/70 (02 Sep 2024 12:03) (114/70 - 151/71)  BP(mean): --  ABP: --  ABP(mean): --  RR: 18 (02 Sep 2024 12:03) (18 - 18)  SpO2: 96% (02 Sep 2024 12:03) (94% - 96%)    O2 Parameters below as of 02 Sep 2024 12:03  Patient On (Oxygen Delivery Method): room air           GEN: NAD, sitting in chair  HEENT: normocephalic and atraumatic.  NECK: Supple.    LUNGS: Normal respiratory effort  HEART: Regular rate and rhythm   ABDOMEN: Soft, nontender, and nondistended.    EXTREMITIES: No leg edema. (+) palpable lump under L knee, erythema improved    Labs:  09-02    143  |  110<H>  |  27<H>  ----------------------------<  121<H>  4.2   |  25  |  1.30    Ca    9.3      02 Sep 2024 06:00                            11.2   6.59  )-----------( 174      ( 02 Sep 2024 06:00 )             35.4       Urinalysis Basic - ( 02 Sep 2024 06:00 )    Color: x / Appearance: x / SG: x / pH: x  Gluc: 121 mg/dL / Ketone: x  / Bili: x / Urobili: x   Blood: x / Protein: x / Nitrite: x   Leuk Esterase: x / RBC: x / WBC x   Sq Epi: x / Non Sq Epi: x / Bacteria: x          RECENT CULTURES:  08-30 @ 01:45 Joint Fl Synovial Fluid     No growth to date.    No polymorphonuclear cells seen per low power field  No organisms seen per oil power field      08-29 @ 20:50 .Blood Blood-Peripheral     No growth at 72 Hours        08-29 @ 20:45 .Blood Blood-Peripheral     No growth at 72 Hours              All imaging and data are reviewed.     < from: MR Lower Ext Non-joint No Cont, Left (09.01.24 @ 10:39) >  Findings:    Status post knee arthroplasty. Evaluation of the knee is limited due to   susceptibility artifact and the fact that the knee is on the edge of the   field-of-view.    Focal STIR hyperintense, T1 hypointense lesion along the anteromedial   margin of the proximal tibial metaphysis, adjacent to the patellar tendon   insertion, measuring 1.5 x 0.9 cm transaxially and 2.7 cm proximal to   distal, with STIR hyperintense signal and T1 marrow signal abnormality   within the subjacent bone with possible erosion Constellation of findings   is concerning for a phlegmonous collection with underlying osteomyelitis.   Evaluation for drainable fluid collection is limited due to lack of   intravenous contrast. An underlying mass/neoplasm in this region cannot   be excluded and is also a differential consideration. Recommend   short-term interval follow-up MRI without and with contrast in 8 weeks to   demonstrate resolutionof these findings and to rule out an underlying   mass/neoplasm.    Musculature is unremarkable. Neurovascular structures are unremarkable.    IMPRESSION:    Focal STIR hyperintense, T1 hypointense lesion along the anteromedial   margin of the proximal tibial metaphysis, adjacent to the patellar tendon   insertion, with STIR hyperintense signal and T1 marrow signal abnormality   within the subjacent bone with possible erosion. Constellation of   findings is concerning for a phlegmonous collectionwith underlying   osteomyelitis. Evaluation for a drainable fluid collection is limited due   to lack of intravenous contrast. An underlying mass/neoplasm in this   region cannot be excluded and is also a differential consideration.   Recommend short-term interval follow-up MRI of the tibia/fibula/knee   without and with contrast in 8 weeks to demonstrate resolution of these   findings and to rule out an underlying mass/neoplasm.    < end of copied text >

## 2024-09-02 NOTE — PHYSICAL THERAPY INITIAL EVALUATION ADULT - PERTINENT HX OF CURRENT PROBLEM, REHAB EVAL
Per H&P: "89 Y.O.M with PMH of DM tytpe 2 insulin dependent , HTN, HLD, BPH and gout   presented to ED due to L knee pain that started about 2 days ago and noticed that it was getting swollen and warm to touch so he decided to come to ED. patient of note pt had traumatic LLE calf area on may 2024 wound that was not healing , pt had debridement with Dr. zuniga and was following in wound clinic , currently completely healed. patient denied any fever , chills , nausea , vomiting , abdominal pain , chest pain or SOB" Of note Pt s/p aspiration L LE.    MR L LE: hypointense lesion along the anteromedial margin of the proximal tibial metaphysis, adjacent to the patellar tendon insertion, with STIR hyperintense signal and T1 marrow signal abnormality   within the subjacent bone with possible erosion.

## 2024-09-02 NOTE — DISCHARGE NOTE PROVIDER - DISCHARGING ATTENDING PHYSICIAN:
Pt to ER seeking detox. Pt reports daily drinking. Endorses depression \"over the holidays\". Pt endorses drinking 5th of vodka before coming to hospital. Pt is tearful in triage.    Prashant Mcdaniel

## 2024-09-02 NOTE — DISCHARGE NOTE PROVIDER - PROVIDER TOKENS
PROVIDER:[TOKEN:[[id number]],FOLLOWUP:[1 month]],PROVIDER:[TOKEN:[[id number]],FOLLOWUP:[2 weeks]],FREE:[LAST:[your primary care doctor],PHONE:[(   )    -],FAX:[(   )    -],FOLLOWUP:[1 week]] PROVIDER:[TOKEN:[[id number]],FOLLOWUP:[2 weeks]],PROVIDER:[TOKEN:[[id number]],FOLLOWUP:[2 weeks]],FREE:[LAST:[your primary care doctor],PHONE:[(   )    -],FAX:[(   )    -],FOLLOWUP:[1 week]]

## 2024-09-02 NOTE — DISCHARGE NOTE PROVIDER - NSDCMRMEDTOKEN_GEN_ALL_CORE_FT
allopurinol 100 mg oral tablet: 1 tab(s) orally once a day  aspirin 81 mg oral tablet: orally once a day  atorvastatin 10 mg oral tablet: 1 tab(s) orally once a day  Flomax 0.4 mg oral capsule: 1 cap(s) orally once a day  Insulin Glargine: 12-16 U  Januvia 50 mg oral tablet: 1 tab(s) orally once a day  losartan 100 mg oral tablet: 1 tab(s) orally once a day  Nephplex Rx oral tablet: 1 tab(s) orally  NIFEdipine (Eqv-Adalat CC) 60 mg oral tablet, extended release: 1 tab(s) orally once a day  pioglitazone 30 mg oral tablet: 1 tab(s) orally once a day   acetaminophen 325 mg oral tablet: 2 tab(s) orally every 6 hours As needed Mild Pain (1 - 3)  allopurinol 100 mg oral tablet: 1 tab(s) orally once a day  aspirin 81 mg oral tablet: orally once a day  atorvastatin 10 mg oral tablet: 1 tab(s) orally once a day  cefTRIAXone: 2 gram(s) intravenous once a day until 10/10/2024  Flomax 0.4 mg oral capsule: 1 cap(s) orally once a day  insulin glargine 100 units/mL subcutaneous solution: 7 unit(s) subcutaneous once a day (at bedtime)  Januvia 50 mg oral tablet: 1 tab(s) orally once a day  loratadine 10 mg oral tablet: 1 tab(s) orally once a day  losartan 100 mg oral tablet: 1 tab(s) orally once a day  Nephplex Rx oral tablet: 1 tab(s) orally once a day  NIFEdipine (Eqv-Adalat CC) 60 mg oral tablet, extended release: 1 tab(s) orally once a day  pioglitazone 30 mg oral tablet: 1 tab(s) orally once a day  traMADol 50 mg oral tablet: 0.5 tab(s) orally every 6 hours as needed for Moderate Pain (4 - 6) MDD: 4

## 2024-09-02 NOTE — DISCHARGE NOTE PROVIDER - NSDCCPCAREPLAN_GEN_ALL_CORE_FT
PRINCIPAL DISCHARGE DIAGNOSIS  Diagnosis: Leg osteomyelitis, left  Assessment and Plan of Treatment: left proximal tibia:   ID consulted and started IV vanco and cefepime. orthopedic consulted, s/p left knee joint aspiration, joint fluid culture and PCR  negative. no surgical intervention  as per Ortho.   CT : CT LLE: Status post left knee total arthroplasty with CT evidence for interval  development of focal osteomyelitis along the anteromedial margin of the   tibial component at the level of the metaphysis. Overlying soft tissue   phlegmon without definite drainable abscess on this non-IV contrast   enhanced examination  MRI LLE: Focal STIR hyperintense, T1 hypointense lesion along the anteromedial   margin of the proximal tibial metaphysis, adjacent to the patellar tendon insertion, with STIR hyperintense signal and T1 marrow signal abnormality within the subjacent bone with possible erosion. Constellation of   findings is concerning for a phlegmonous collection with underlying   osteomyelitis. Evaluation for a drainable fluid collection is limited due   to lack of intravenous contrast. An underlying mass/neoplasm in this   region cannot be excluded and is also a differential consideration.   Recommend short-term interval follow-up MRI of the tibia/fibula/knee without and with contrast in 8 weeks to demonstrate resolution of these   findings and to rule out an underlying mass/neoplasm.  Picc line placed for long term IV antibiotic treatment as ordered.   followup ID AND orthopedic as discussed  and  repeat MRI LLE with and without contrast suggested  in 8 weeks to demonstrate resolution of these findings and to rule out an underlying mass/neoplasm.      SECONDARY DISCHARGE DIAGNOSES  Diagnosis: Cellulitis  Assessment and Plan of Treatment:      PRINCIPAL DISCHARGE DIAGNOSIS  Diagnosis: Leg osteomyelitis, left  Assessment and Plan of Treatment: left proximal tibia:   ID consulted and started IV vanco and cefepime. orthopedic consulted, s/p left knee joint aspiration, joint fluid culture and PCR  negative. no surgical intervention  as per Ortho.   CT : CT LLE: Status post left knee total arthroplasty with CT evidence for interval  development of focal osteomyelitis along the anteromedial margin of the   tibial component at the level of the metaphysis. Overlying soft tissue   phlegmon without definite drainable abscess on this non-IV contrast   enhanced examination  MRI LLE: Focal STIR hyperintense, T1 hypointense lesion along the anteromedial   margin of the proximal tibial metaphysis, adjacent to the patellar tendon insertion, with STIR hyperintense signal and T1 marrow signal abnormality within the subjacent bone with possible erosion. Constellation of   findings is concerning for a phlegmonous collection with underlying   osteomyelitis. Evaluation for a drainable fluid collection is limited due   to lack of intravenous contrast. An underlying mass/neoplasm in this   region cannot be excluded and is also a differential consideration.   Recommend short-term interval follow-up MRI of the tibia/fibula/knee without and with contrast in 8 weeks to demonstrate resolution of these   findings and to rule out an underlying mass/neoplasm.  Picc line placed for long term IV antibiotic treatment as ordered.   followup ID AND orthopedic as discussed  and  repeat MRI LLE with and without contrast suggested  in 8 weeks to demonstrate resolution of these findings and to rule out an underlying mass/neoplasm.      SECONDARY DISCHARGE DIAGNOSES  Diagnosis: Cellulitis  Assessment and Plan of Treatment: Continue IV antibiotics per ID recommendations.

## 2024-09-02 NOTE — PROGRESS NOTE ADULT - SUBJECTIVE AND OBJECTIVE BOX
Patient is a 89y old  Male who presents with a chief complaint of Knee pain     (31 Aug 2024 12:14)    Patient seen in follow up for CKD 3.         PAST MEDICAL HISTORY:  HTN (hypertension)    DM2 (diabetes mellitus, type 2)    BPH (benign prostatic hyperplasia)    Prostate CA    HLD (hyperlipidemia)    Gout    History of gastroesophageal reflux (GERD)    Shingles      MEDICATIONS  (STANDING):  allopurinol 100 milliGRAM(s) Oral daily  aspirin enteric coated 81 milliGRAM(s) Oral daily  atorvastatin 10 milliGRAM(s) Oral at bedtime  cefepime   IVPB 2000 milliGRAM(s) IV Intermittent every 12 hours  dextrose 5%. 1000 milliLiter(s) (100 mL/Hr) IV Continuous <Continuous>  dextrose 5%. 1000 milliLiter(s) (50 mL/Hr) IV Continuous <Continuous>  dextrose 50% Injectable 25 Gram(s) IV Push once  dextrose 50% Injectable 12.5 Gram(s) IV Push once  dextrose 50% Injectable 25 Gram(s) IV Push once  glucagon  Injectable 1 milliGRAM(s) IntraMuscular once  heparin   Injectable 5000 Unit(s) SubCutaneous every 8 hours  insulin glargine Injectable (LANTUS) 7 Unit(s) SubCutaneous at bedtime  insulin lispro (ADMELOG) corrective regimen sliding scale   SubCutaneous three times a day before meals  loratadine 10 milliGRAM(s) Oral daily  losartan 100 milliGRAM(s) Oral daily  NIFEdipine XL 60 milliGRAM(s) Oral daily  tamsulosin 0.4 milliGRAM(s) Oral at bedtime  vancomycin  IVPB 1000 milliGRAM(s) IV Intermittent every 24 hours    MEDICATIONS  (PRN):  acetaminophen     Tablet .. 650 milliGRAM(s) Oral every 6 hours PRN Mild Pain (1 - 3)  dextrose Oral Gel 15 Gram(s) Oral once PRN Blood Glucose LESS THAN 70 milliGRAM(s)/deciliter  traMADol 25 milliGRAM(s) Oral every 6 hours PRN Moderate Pain (4 - 6)  traMADol 50 milliGRAM(s) Oral every 6 hours PRN Severe Pain (7 - 10)    T(C): 36.3 (09-02-24 @ 12:03), Max: 37.2 (08-31-24 @ 20:30)  HR: 98 (09-02-24 @ 12:03) (65 - 98)  BP: 114/70 (09-02-24 @ 12:03) (114/70 - 151/71)  RR: 18 (09-02-24 @ 12:03)  SpO2: 96% (09-02-24 @ 12:03)  Wt(kg): --  I&O's Detail    01 Sep 2024 07:01  -  02 Sep 2024 07:00  --------------------------------------------------------  IN:  Total IN: 0 mL    OUT:    Stool (mL): 1 mL    Voided (mL): 700 mL  Total OUT: 701 mL    Total NET: -701 mL            PHYSICAL EXAM:  General: No distress  Respiratory: b/l air entry  Cardiovascular: S1 S2  Gastrointestinal: soft  Extremities:  no edema                   LABORATORY:                        11.2   6.59  )-----------( 174      ( 02 Sep 2024 06:00 )             35.4     09-02    143  |  110<H>  |  27<H>  ----------------------------<  121<H>  4.2   |  25  |  1.30    Ca    9.3      02 Sep 2024 06:00      Sodium: 143 mmol/L (09-02 @ 06:00)  Sodium: 139 mmol/L (09-01 @ 07:17)    Potassium: 4.2 mmol/L (09-02 @ 06:00)  Potassium: 4.1 mmol/L (09-01 @ 07:17)    Hemoglobin: 11.2 g/dL (09-02 @ 06:00)  Hemoglobin: 12.4 g/dL (09-01 @ 07:17)  Hemoglobin: 11.5 g/dL (08-31 @ 06:50)    Creatinine, Serum 1.30 (09-02 @ 06:00)  Creatinine, Serum 1.30 (09-01 @ 07:17)  Creatinine, Serum 1.30 (08-31 @ 06:50)          Urinalysis Basic - ( 02 Sep 2024 06:00 )    Color: x / Appearance: x / SG: x / pH: x  Gluc: 121 mg/dL / Ketone: x  / Bili: x / Urobili: x   Blood: x / Protein: x / Nitrite: x   Leuk Esterase: x / RBC: x / WBC x   Sq Epi: x / Non Sq Epi: x / Bacteria: x

## 2024-09-02 NOTE — PHYSICAL THERAPY INITIAL EVALUATION ADULT - GENERAL OBSERVATIONS, REHAB EVAL
Patient was received sitting in chair in NAD. Mandarin  unable to be utilized due to pt very [place]; pt able to follow commands via gestures/nonverbal cues

## 2024-09-02 NOTE — DISCHARGE NOTE PROVIDER - HOSPITAL COURSE
ADMISSION DATE:  08-29-24    ---  FROM ADMISSION H+P:   HPI:  89 Y.O.M with PMH of DM tytpe 2 insulin dependent , HTN, HLD, BPH and gout   presented to ED due to L knee pain that started about 2 days ago and noticed that it was getting swollen and warm to touch   so he decided to come to ED  patient of note pt had traumatic LLE calf area on may 2024 wound that was not healing , pt had debridement with Dr. zuniga and was following in wound clinic , currently completely healed   patient denied any fever , chills , nausea , vomiting , abdominal pain , chest pain or SOB   (29 Aug 2024 23:35)      ---  HOSPITAL COURSE/PERTINENT LABS/PROCEDURES PERFORMED/PENDING TESTS:   Pt was admitted for LLE cellulitis/ left proximal tibia OM : ID consulted and started IV vanco and cefepime. orthopedic consulted, s/p left knee joint aspiration, joint fluid culture and pcr negative. CPPD cystals positive. no surgical intervention  as per Ortho.   CT : CT LLE: Status post left knee total arthroplasty with CT evidence for interval   development of focal osteomyelitis along the anteromedial margin of the   tibial component at the level of the metaphysis. Overlying soft tissue   phlegmon without definite drainable abscess on this non-IV contrast   enhanced examination  MRI LLE: Focal STIR hyperintense, T1 hypointense lesion along the anteromedial   margin of the proximal tibial metaphysis, adjacent to the patellar tendon   insertion, with STIR hyperintense signal and T1 marrow signal abnormality   within the subjacent bone with possible erosion. Constellation of   findings is concerning for a phlegmonous collection with underlying   osteomyelitis. Evaluation for a drainable fluid collection is limited due   to lack of intravenous contrast. An underlying mass/neoplasm in this   region cannot be excluded and is also a differential consideration.   Recommend short-term interval follow-up MRI of the tibia/fibula/knee   without and with contrast in 8 weeks to demonstrate resolution of these   findings and to rule out an underlying mass/neoplasm.    switched antibiotic to ancef and  palced picc for total 6 weeks IV antibiotic and out patient follow up.          Patient is stable for discharge as per primary medical team and consultants.    PT consulted, recommends discharge ______no skilled PT needs     Patient showed improvement throughout hospitalization. Patient was seen and examined on day of discharge. Patient was medically optimized for discharge with close outpatient follow up.    ---  PATIENT CONDITION:  - stable    --  VITALS:   T(C): 36.3 (09-02-24 @ 12:03), Max: 36.7 (09-01-24 @ 20:07)  HR: 98 (09-02-24 @ 12:03) (65 - 98)  BP: 114/70 (09-02-24 @ 12:03) (114/70 - 151/71)  RR: 18 (09-02-24 @ 12:03) (18 - 18)  SpO2: 96% (09-02-24 @ 12:03) (94% - 96%)    PHYSICAL EXAM ON DAY OF DISCHARGE:    ---  CONSULTANTS:   orthopedic   ID        ADMISSION DATE:  08-29-24    ---  FROM ADMISSION H+P:   HPI:  89 Y.O.M with PMH of DM type 2 insulin dependent , HTN, HLD, BPH and gout   presented to ED due to L knee pain that started about 2 days ago and noticed that it was getting swollen and warm to touch   so he decided to come to ED  patient of note pt had traumatic LLE calf area on may 2024 wound that was not healing , pt had debridement with Dr. zuniga and was following in wound clinic , currently completely healed   patient denied any fever , chills , nausea , vomiting , abdominal pain , chest pain or SOB   (29 Aug 2024 23:35)      ---  HOSPITAL COURSE/PERTINENT LABS/PROCEDURES PERFORMED/PENDING TESTS:   Pt was admitted for LLE cellulitis/ left proximal tibia OM : ID consulted and started IV vanco and cefepime. orthopedic consulted, s/p left knee joint aspiration, joint fluid culture and pcr negative. CPPD cystals positive. no surgical intervention  as per Ortho.   CT : CT LLE: Status post left knee total arthroplasty with CT evidence for interval   development of focal osteomyelitis along the anteromedial margin of the   tibial component at the level of the metaphysis. Overlying soft tissue   phlegmon without definite drainable abscess on this non-IV contrast   enhanced examination  MRI LLE: Focal STIR hyperintense, T1 hypointense lesion along the anteromedial   margin of the proximal tibial metaphysis, adjacent to the patellar tendon   insertion, with STIR hyperintense signal and T1 marrow signal abnormality   within the subjacent bone with possible erosion. Constellation of   findings is concerning for a phlegmonous collection with underlying   osteomyelitis. Evaluation for a drainable fluid collection is limited due   to lack of intravenous contrast. An underlying mass/neoplasm in this   region cannot be excluded and is also a differential consideration.   Recommend short-term interval follow-up MRI of the tibia/fibula/knee   without and with contrast in 8 weeks to demonstrate resolution of these   findings and to rule out an underlying mass/neoplasm.    switched antibiotic to Ceftraixone and  placed picc for total 6 weeks IV antibiotic and out patient follow up.          Patient is stable for discharge as per primary medical team and consultants.    PT consulted, recommends discharge to home with no skilled PT needs     Patient showed improvement throughout hospitalization. Patient was seen and examined on day of discharge. Patient was medically optimized for discharge with close outpatient follow up.    On day of discharge patient is in no distress.  Afebrile and hemodynamically stable.      ---  PATIENT CONDITION:  - stable  ---  CONSULTANTS:   orthopedic   ID

## 2024-09-02 NOTE — DISCHARGE NOTE PROVIDER - CARE PROVIDERS DIRECT ADDRESSES
,DirectAddress_Unknown,[email or web address],DirectAddress_Unknown

## 2024-09-02 NOTE — PROGRESS NOTE ADULT - SUBJECTIVE AND OBJECTIVE BOX
Patient is a 89y old  Male who presents with a chief complaint of IV Abx (02 Sep 2024 15:24)      Subjective:  INTERVAL HPI/OVERNIGHT EVENTS: Patient seen and examined at bedside.  Patient c/o intermittent LLE and foot pain.   MEDICATIONS  (STANDING):  allopurinol 100 milliGRAM(s) Oral daily  aspirin enteric coated 81 milliGRAM(s) Oral daily  atorvastatin 10 milliGRAM(s) Oral at bedtime  ceFAZolin   IVPB 2000 milliGRAM(s) IV Intermittent every 12 hours  dextrose 5%. 1000 milliLiter(s) (100 mL/Hr) IV Continuous <Continuous>  dextrose 5%. 1000 milliLiter(s) (50 mL/Hr) IV Continuous <Continuous>  dextrose 50% Injectable 25 Gram(s) IV Push once  dextrose 50% Injectable 12.5 Gram(s) IV Push once  dextrose 50% Injectable 25 Gram(s) IV Push once  glucagon  Injectable 1 milliGRAM(s) IntraMuscular once  heparin   Injectable 5000 Unit(s) SubCutaneous every 8 hours  insulin glargine Injectable (LANTUS) 7 Unit(s) SubCutaneous at bedtime  insulin lispro (ADMELOG) corrective regimen sliding scale   SubCutaneous three times a day before meals  loratadine 10 milliGRAM(s) Oral daily  losartan 100 milliGRAM(s) Oral daily  NIFEdipine XL 60 milliGRAM(s) Oral daily  tamsulosin 0.4 milliGRAM(s) Oral at bedtime    MEDICATIONS  (PRN):  acetaminophen     Tablet .. 650 milliGRAM(s) Oral every 6 hours PRN Mild Pain (1 - 3)  dextrose Oral Gel 15 Gram(s) Oral once PRN Blood Glucose LESS THAN 70 milliGRAM(s)/deciliter  traMADol 25 milliGRAM(s) Oral every 6 hours PRN Moderate Pain (4 - 6)  traMADol 50 milliGRAM(s) Oral every 6 hours PRN Severe Pain (7 - 10)      Allergies    No Known Allergies    Intolerances        REVIEW OF SYSTEMS:  CONSTITUTIONAL: No fever or chills  HEENT:  No headache, no sore throat  RESPIRATORY: No cough or shortness of breath  CARDIOVASCULAR: No chest pain or palpitations  GASTROINTESTINAL: No abd pain, nausea, vomiting, or diarrhea      Objective:  Vital Signs Last 24 Hrs  T(C): 36.3 (02 Sep 2024 12:03), Max: 36.7 (01 Sep 2024 20:07)  T(F): 97.4 (02 Sep 2024 12:03), Max: 98.1 (01 Sep 2024 20:07)  HR: 98 (02 Sep 2024 12:03) (65 - 98)  BP: 114/70 (02 Sep 2024 12:03) (114/70 - 151/71)  BP(mean): --  RR: 18 (02 Sep 2024 12:03) (18 - 18)  SpO2: 96% (02 Sep 2024 12:03) (94% - 96%)    Parameters below as of 02 Sep 2024 12:03  Patient On (Oxygen Delivery Method): room air        GENERAL: NAD, lying in bed comfortably  HEAD:  Normocephalic  EYES:  conjunctiva and sclera clear  ENT: Moist mucous membranes  NECK: Supple  CHEST/LUNG: Clear to auscultation bilaterally; No rales or rhonchi; no wheezing. Unlabored respirations  HEART: Regular rate and rhythm; S1S2+  ABDOMEN: Bowel sounds present; Soft, Nontender, Nondistended.   EXTREMITIES:  + distal Peripheral Pulses;  No cyanosis, or edema, Left below knee swollen area erythema improved. but still warm and tender   NERVOUS SYSTEM:  Alert & Oriented X3;  No gross focal deficits   MSK: moves all extremities  SKIN: No rashes     LABS:                        11.2   6.59  )-----------( 174      ( 02 Sep 2024 06:00 )             35.4     02 Sep 2024 06:00    143    |  110    |  27     ----------------------------<  121    4.2     |  25     |  1.30     Ca    9.3        02 Sep 2024 06:00        Urinalysis Basic - ( 02 Sep 2024 06:00 )    Color: x / Appearance: x / SG: x / pH: x  Gluc: 121 mg/dL / Ketone: x  / Bili: x / Urobili: x   Blood: x / Protein: x / Nitrite: x   Leuk Esterase: x / RBC: x / WBC x   Sq Epi: x / Non Sq Epi: x / Bacteria: x      CAPILLARY BLOOD GLUCOSE      POCT Blood Glucose.: 147 mg/dL (02 Sep 2024 16:53)  POCT Blood Glucose.: 171 mg/dL (02 Sep 2024 11:27)  POCT Blood Glucose.: 114 mg/dL (02 Sep 2024 07:32)  POCT Blood Glucose.: 171 mg/dL (01 Sep 2024 22:22)  POCT Blood Glucose.: 160 mg/dL (01 Sep 2024 21:12)        Culture - Joint (collected 08-30-24 @ 01:45)  Source: Joint Fl Synovial Fluid  Gram Stain (08-30-24 @ 12:57):    No polymorphonuclear cells seen per low power field    No organisms seen per oil power field  Preliminary Report (08-31-24 @ 09:08):    No growth to date.    Culture - Blood (collected 08-29-24 @ 20:50)  Source: .Blood Blood-Peripheral  Preliminary Report (09-02-24 @ 01:02):    No growth at 72 Hours    Culture - Blood (collected 08-29-24 @ 20:45)  Source: .Blood Blood-Peripheral  Preliminary Report (09-02-24 @ 01:02):    No growth at 72 Hours        RADIOLOGY & ADDITIONAL TESTS:    Personally reviewed.     Consultant(s) Notes Reviewed:  [x] YES  [ ] NO    Plan of care discussed with patient /family Son on the phone ; all questions answered

## 2024-09-02 NOTE — DISCHARGE NOTE PROVIDER - CARE PROVIDER_API CALL
Edwardo Wick  Orthopaedic Surgery  [address] [place] [id number]  Phone: [phone]  Fax: [phone]  Follow Up Time: 1 month    Clover Wolff  Internal Medicine  [address] [place] [id number]  Phone: [phone]  Fax: [phone]  Follow Up Time: 2 weeks    your primary care doctor,   Phone: (   )    -  Fax: (   )    -  Follow Up Time: 1 week   Edwardo Wick  Orthopaedic Surgery  [address] [place] [id number]  Phone: [phone]  Fax: [phone]  Follow Up Time: 2 weeks    Clover Wolff  Internal Medicine  [address] [place] [address]  Phone: [phone]  Fax: [phone]  Follow Up Time: 2 weeks    your primary care doctor,   Phone: (   )    -  Fax: (   )    -  Follow Up Time: 1 week

## 2024-09-03 ENCOUNTER — APPOINTMENT (OUTPATIENT)
Dept: WOUND CARE | Facility: HOSPITAL | Age: 89
End: 2024-09-03

## 2024-09-03 LAB
ANION GAP SERPL CALC-SCNC: 7 MMOL/L — SIGNIFICANT CHANGE UP (ref 5–17)
BASOPHILS # BLD AUTO: 0.03 K/UL — SIGNIFICANT CHANGE UP (ref 0–0.2)
BASOPHILS NFR BLD AUTO: 0.4 % — SIGNIFICANT CHANGE UP (ref 0–2)
BUN SERPL-MCNC: 28 MG/DL — HIGH (ref 7–23)
CALCIUM SERPL-MCNC: 9.1 MG/DL — SIGNIFICANT CHANGE UP (ref 8.5–10.1)
CHLORIDE SERPL-SCNC: 112 MMOL/L — HIGH (ref 96–108)
CO2 SERPL-SCNC: 24 MMOL/L — SIGNIFICANT CHANGE UP (ref 22–31)
CREAT SERPL-MCNC: 1.4 MG/DL — HIGH (ref 0.5–1.3)
EGFR: 48 ML/MIN/1.73M2 — LOW
EOSINOPHIL # BLD AUTO: 0.27 K/UL — SIGNIFICANT CHANGE UP (ref 0–0.5)
EOSINOPHIL NFR BLD AUTO: 3.9 % — SIGNIFICANT CHANGE UP (ref 0–6)
GLUCOSE SERPL-MCNC: 136 MG/DL — HIGH (ref 70–99)
HCT VFR BLD CALC: 36.1 % — LOW (ref 39–50)
HGB BLD-MCNC: 11.6 G/DL — LOW (ref 13–17)
IMM GRANULOCYTES NFR BLD AUTO: 0.3 % — SIGNIFICANT CHANGE UP (ref 0–0.9)
LYMPHOCYTES # BLD AUTO: 1.94 K/UL — SIGNIFICANT CHANGE UP (ref 1–3.3)
LYMPHOCYTES # BLD AUTO: 28.2 % — SIGNIFICANT CHANGE UP (ref 13–44)
MCHC RBC-ENTMCNC: 32.1 GM/DL — SIGNIFICANT CHANGE UP (ref 32–36)
MCHC RBC-ENTMCNC: 32.5 PG — SIGNIFICANT CHANGE UP (ref 27–34)
MCV RBC AUTO: 101.1 FL — HIGH (ref 80–100)
MONOCYTES # BLD AUTO: 0.69 K/UL — SIGNIFICANT CHANGE UP (ref 0–0.9)
MONOCYTES NFR BLD AUTO: 10 % — SIGNIFICANT CHANGE UP (ref 2–14)
NEUTROPHILS # BLD AUTO: 3.94 K/UL — SIGNIFICANT CHANGE UP (ref 1.8–7.4)
NEUTROPHILS NFR BLD AUTO: 57.2 % — SIGNIFICANT CHANGE UP (ref 43–77)
NRBC # BLD: 0 /100 WBCS — SIGNIFICANT CHANGE UP (ref 0–0)
PLATELET # BLD AUTO: 170 K/UL — SIGNIFICANT CHANGE UP (ref 150–400)
POTASSIUM SERPL-MCNC: 4.3 MMOL/L — SIGNIFICANT CHANGE UP (ref 3.5–5.3)
POTASSIUM SERPL-SCNC: 4.3 MMOL/L — SIGNIFICANT CHANGE UP (ref 3.5–5.3)
RBC # BLD: 3.57 M/UL — LOW (ref 4.2–5.8)
RBC # FLD: 12.9 % — SIGNIFICANT CHANGE UP (ref 10.3–14.5)
SODIUM SERPL-SCNC: 143 MMOL/L — SIGNIFICANT CHANGE UP (ref 135–145)
WBC # BLD: 6.89 K/UL — SIGNIFICANT CHANGE UP (ref 3.8–10.5)
WBC # FLD AUTO: 6.89 K/UL — SIGNIFICANT CHANGE UP (ref 3.8–10.5)

## 2024-09-03 PROCEDURE — 99232 SBSQ HOSP IP/OBS MODERATE 35: CPT

## 2024-09-03 PROCEDURE — 77001 FLUOROGUIDE FOR VEIN DEVICE: CPT | Mod: 26,59

## 2024-09-03 PROCEDURE — 36573 INSJ PICC RS&I 5 YR+: CPT

## 2024-09-03 PROCEDURE — 76937 US GUIDE VASCULAR ACCESS: CPT | Mod: 26,59

## 2024-09-03 PROCEDURE — 99239 HOSP IP/OBS DSCHRG MGMT >30: CPT

## 2024-09-03 PROCEDURE — 36000 PLACE NEEDLE IN VEIN: CPT | Mod: 59

## 2024-09-03 RX ORDER — SODIUM CHLORIDE 9 MG/ML
10 INJECTION INTRAMUSCULAR; INTRAVENOUS; SUBCUTANEOUS
Refills: 0 | Status: DISCONTINUED | OUTPATIENT
Start: 2024-09-03 | End: 2024-09-04

## 2024-09-03 RX ORDER — CHLORHEXIDINE GLUCONATE 40 MG/ML
1 SOLUTION TOPICAL
Refills: 0 | Status: DISCONTINUED | OUTPATIENT
Start: 2024-09-03 | End: 2024-09-04

## 2024-09-03 RX ADMIN — Medication 10 MILLIGRAM(S): at 22:00

## 2024-09-03 RX ADMIN — LOSARTAN POTASSIUM 100 MILLIGRAM(S): 50 TABLET ORAL at 06:29

## 2024-09-03 RX ADMIN — Medication 5000 UNIT(S): at 14:14

## 2024-09-03 RX ADMIN — LORATADINE 10 MILLIGRAM(S): 10 CAPSULE, LIQUID FILLED ORAL at 11:56

## 2024-09-03 RX ADMIN — CEFAZOLIN SODIUM 100 MILLIGRAM(S): 2 INJECTION, SOLUTION INTRAVENOUS at 17:27

## 2024-09-03 RX ADMIN — Medication 5000 UNIT(S): at 22:00

## 2024-09-03 RX ADMIN — NIFEDIPINE 60 MILLIGRAM(S): 60 TABLET, FILM COATED, EXTENDED RELEASE ORAL at 06:31

## 2024-09-03 RX ADMIN — INSULIN GLARGINE 7 UNIT(S): 100 INJECTION, SOLUTION SUBCUTANEOUS at 22:00

## 2024-09-03 RX ADMIN — Medication 81 MILLIGRAM(S): at 11:56

## 2024-09-03 RX ADMIN — Medication 2: at 11:55

## 2024-09-03 RX ADMIN — Medication 1: at 17:14

## 2024-09-03 RX ADMIN — TAMSULOSIN HYDROCHLORIDE 0.4 MILLIGRAM(S): 0.4 CAPSULE ORAL at 22:00

## 2024-09-03 RX ADMIN — Medication 5000 UNIT(S): at 06:29

## 2024-09-03 RX ADMIN — Medication 100 MILLIGRAM(S): at 11:56

## 2024-09-03 RX ADMIN — CEFAZOLIN SODIUM 100 MILLIGRAM(S): 2 INJECTION, SOLUTION INTRAVENOUS at 06:29

## 2024-09-03 NOTE — PROGRESS NOTE ADULT - SUBJECTIVE AND OBJECTIVE BOX
[facility]  INFECTIOUS DISEASES - [address]endez, [place] [address]  Tel: [phone]     Fax: [phone]  =======================================================    MARTHA MELCHOR [id number]    Follow up: No fevers. Used language line  (#[id number]), but unable to obtain any history as patient very hard of hearing.    Allergies:  No Known Allergies      Antibiotics:  acetaminophen     Tablet .. 650 milliGRAM(s) Oral every 6 hours PRN  allopurinol 100 milliGRAM(s) Oral daily  aspirin enteric coated 81 milliGRAM(s) Oral daily  atorvastatin 10 milliGRAM(s) Oral at bedtime  ceFAZolin   IVPB 2000 milliGRAM(s) IV Intermittent every 12 hours  dextrose 5%. 1000 milliLiter(s) IV Continuous <Continuous>  dextrose 5%. 1000 milliLiter(s) IV Continuous <Continuous>  dextrose 50% Injectable 25 Gram(s) IV Push once  dextrose 50% Injectable 12.5 Gram(s) IV Push once  dextrose 50% Injectable 25 Gram(s) IV Push once  dextrose Oral Gel 15 Gram(s) Oral once PRN  glucagon  Injectable 1 milliGRAM(s) IntraMuscular once  heparin   Injectable 5000 Unit(s) SubCutaneous every 8 hours  insulin glargine Injectable (LANTUS) 7 Unit(s) SubCutaneous at bedtime  insulin lispro (ADMELOG) corrective regimen sliding scale   SubCutaneous three times a day before meals  loratadine 10 milliGRAM(s) Oral daily  losartan 100 milliGRAM(s) Oral daily  NIFEdipine XL 60 milliGRAM(s) Oral daily  tamsulosin 0.4 milliGRAM(s) Oral at bedtime  traMADol 25 milliGRAM(s) Oral every 6 hours PRN  traMADol 50 milliGRAM(s) Oral every 6 hours PRN       REVIEW OF SYSTEMS:  unable to obtain, hard of hearing     Physical Exam:  ICU Vital Signs Last 24 Hrs  T(C): 36.4 (03 Sep 2024 11:57), Max: 36.5 (02 Sep 2024 20:36)  T(F): 97.6 (03 Sep 2024 11:57), Max: 97.7 (02 Sep 2024 20:36)  HR: 82 (03 Sep 2024 11:57) (64 - 92)  BP: 118/68 (03 Sep 2024 11:57) (118/68 - 132/74)  BP(mean): --  ABP: --  ABP(mean): --  RR: 18 (03 Sep 2024 11:57) (18 - 18)  SpO2: 98% (03 Sep 2024 11:57) (92% - 98%)    O2 Parameters below as of 03 Sep 2024 11:57  Patient On (Oxygen Delivery Method): room air    GEN: NAD, sitting in chair  HEENT: normocephalic and atraumatic.  NECK: Supple.    LUNGS: Normal respiratory effort  HEART: Regular rate and rhythm   ABDOMEN: Soft, nontender, and nondistended.    EXTREMITIES: No leg edema. (+) palpable lump under L knee, no tenderness, erythema improved        Labs:  09-03    143  |  112<H>  |  28<H>  ----------------------------<  136<H>  4.3   |  24  |  1.40<H>    Ca    9.1      03 Sep 2024 05:54                            11.6   6.89  )-----------( 170      ( 03 Sep 2024 05:54 )             36.1       Urinalysis Basic - ( 03 Sep 2024 05:54 )    Color: x / Appearance: x / SG: x / pH: x  Gluc: 136 mg/dL / Ketone: x  / Bili: x / Urobili: x   Blood: x / Protein: x / Nitrite: x   Leuk Esterase: x / RBC: x / WBC x   Sq Epi: x / Non Sq Epi: x / Bacteria: x          RECENT CULTURES:  08-30 @ 01:45 Joint Fl Synovial Fluid     No growth to date.    No polymorphonuclear cells seen per low power field  No organisms seen per oil power field      08-29 @ 20:50 .Blood Blood-Peripheral     No growth at 4 days        08-29 @ 20:45 .Blood Blood-Peripheral     No growth at 4 days              All imaging and data are reviewed.

## 2024-09-03 NOTE — PROGRESS NOTE ADULT - SUBJECTIVE AND OBJECTIVE BOX
Patient is a 89y old  Male who presents with a chief complaint of Knee pain     (31 Aug 2024 12:14)    Patient seen in follow up for CKD 3.         PAST MEDICAL HISTORY:  HTN (hypertension)    DM2 (diabetes mellitus, type 2)    BPH (benign prostatic hyperplasia)    Prostate CA    HLD (hyperlipidemia)    Gout    History of gastroesophageal reflux (GERD)    Shingles      MEDICATIONS  (STANDING):  allopurinol 100 milliGRAM(s) Oral daily  aspirin enteric coated 81 milliGRAM(s) Oral daily  atorvastatin 10 milliGRAM(s) Oral at bedtime  ceFAZolin   IVPB 2000 milliGRAM(s) IV Intermittent every 12 hours  dextrose 5%. 1000 milliLiter(s) (100 mL/Hr) IV Continuous <Continuous>  dextrose 5%. 1000 milliLiter(s) (50 mL/Hr) IV Continuous <Continuous>  dextrose 50% Injectable 25 Gram(s) IV Push once  dextrose 50% Injectable 12.5 Gram(s) IV Push once  dextrose 50% Injectable 25 Gram(s) IV Push once  glucagon  Injectable 1 milliGRAM(s) IntraMuscular once  heparin   Injectable 5000 Unit(s) SubCutaneous every 8 hours  insulin glargine Injectable (LANTUS) 7 Unit(s) SubCutaneous at bedtime  insulin lispro (ADMELOG) corrective regimen sliding scale   SubCutaneous three times a day before meals  loratadine 10 milliGRAM(s) Oral daily  losartan 100 milliGRAM(s) Oral daily  NIFEdipine XL 60 milliGRAM(s) Oral daily  tamsulosin 0.4 milliGRAM(s) Oral at bedtime    MEDICATIONS  (PRN):  acetaminophen     Tablet .. 650 milliGRAM(s) Oral every 6 hours PRN Mild Pain (1 - 3)  dextrose Oral Gel 15 Gram(s) Oral once PRN Blood Glucose LESS THAN 70 milliGRAM(s)/deciliter  traMADol 25 milliGRAM(s) Oral every 6 hours PRN Moderate Pain (4 - 6)  traMADol 50 milliGRAM(s) Oral every 6 hours PRN Severe Pain (7 - 10)    T(C): 36.4 (09-03-24 @ 04:55), Max: 36.7 (09-01-24 @ 20:07)  HR: 64 (09-03-24 @ 04:55) (64 - 98)  BP: 132/71 (09-03-24 @ 04:55) (114/70 - 151/71)  RR: 18 (09-03-24 @ 04:55)  SpO2: 96% (09-03-24 @ 04:55)  Wt(kg): --  I&O's Detail    02 Sep 2024 07:01  -  03 Sep 2024 07:00  --------------------------------------------------------  IN:  Total IN: 0 mL    OUT:    Stool (mL): 1 mL  Total OUT: 1 mL    Total NET: -1 mL              PHYSICAL EXAM:  General: No distress  Respiratory: b/l air entry  Cardiovascular: S1 S2  Gastrointestinal: soft  Extremities:  no edema                   LABORATORY:                        11.6   6.89  )-----------( 170      ( 03 Sep 2024 05:54 )             36.1     09-03    143  |  112<H>  |  28<H>  ----------------------------<  136<H>  4.3   |  24  |  1.40<H>    Ca    9.1      03 Sep 2024 05:54      Sodium: 143 mmol/L (09-03 @ 05:54)  Sodium: 143 mmol/L (09-02 @ 06:00)    Potassium: 4.3 mmol/L (09-03 @ 05:54)  Potassium: 4.2 mmol/L (09-02 @ 06:00)    Hemoglobin: 11.6 g/dL (09-03 @ 05:54)  Hemoglobin: 11.2 g/dL (09-02 @ 06:00)  Hemoglobin: 12.4 g/dL (09-01 @ 07:17)    Creatinine, Serum 1.40 (09-03 @ 05:54)  Creatinine, Serum 1.30 (09-02 @ 06:00)  Creatinine, Serum 1.30 (09-01 @ 07:17)          Urinalysis Basic - ( 03 Sep 2024 05:54 )    Color: x / Appearance: x / SG: x / pH: x  Gluc: 136 mg/dL / Ketone: x  / Bili: x / Urobili: x   Blood: x / Protein: x / Nitrite: x   Leuk Esterase: x / RBC: x / WBC x   Sq Epi: x / Non Sq Epi: x / Bacteria: x

## 2024-09-03 NOTE — CAREGIVER ENGAGEMENT NOTE - CAREGIVER OUTREACH METHOD
Subjective:    Referred by: Scotty Panchal MD    Chief Complaint   Patient presents with   • Consultation     constipation x 6 months  Colon: 12/11/2017 (in chart)   • Office Visit       HPI:  The patient is a 78 year old male who presents for gastroenterology consultation regarding constipation over the last 6 months. The patient reports lower abdominal discomfort. He states management in symptoms with a high fiber diet.    The patient had a colonoscopy on 12/11/2017 (see results below) where colon polyps of the sigmoid colon and cecum were removed.    He denies taking new medication and bloody stool.    Family history negative for colon cancer.  Medical history significant for DM, HTN, and HLD.  Surgical history negative for abdominal procedures.      ROS:  A complete 14-point review of systems negative except as noted in the HPI above.    Objective  Current Outpatient Medications   Medication Sig Dispense Refill   • triamcinolone (KENALOG) 0.025 % cream Apply topically 2 times daily. 30 g 1   • sildenafil (VIAGRA) 50 MG tablet Take 1 tablet by mouth as needed for Erectile Dysfunction. 6 tablet 3   • amLODIPine (NORVASC) 10 MG tablet Take 1 tablet by mouth daily. 90 tablet 3   • rosuvastatin (CRESTOR) 20 MG tablet Take 1 tablet by mouth daily. 90 tablet 3   • metFORMIN (GLUCOPHAGE) 850 MG tablet Take 1 tablet by mouth 2 times daily (with meals). 180 tablet 3   • losartan-hydrochlorothiazide (HYZAAR) 100-25 MG per tablet Take 1 tablet by mouth daily. 90 tablet 3   • docusate sodium (Colace Clear) 50 MG capsule Take 1 capsule by mouth daily. 30 capsule 0   • ACCU-CHEK FASTCLIX LANCETS Misc USE TO TEST BLOOD SUGAR TWO TO THREE TIMES  DAILY     • blood glucose (ACCU-CHEK SMARTVIEW) test strip TEST BLOOD SUGAR 2 TO 3 TIMES DAILY AS DIRECTED       No current facility-administered medications for this visit.       Past Medical History:   Diagnosis Date   • Essential (primary) hypertension        Past Surgical History: 
  Procedure Laterality Date   • Glaucoma surgery N/A    • Rotator cuff repair N/A        Social History     Tobacco Use   • Smoking status: Never Smoker   • Smokeless tobacco: Never Used   Substance Use Topics   • Alcohol use: Yes   • Drug use: Not on file       Visit Vitals  BP (!) 169/85   Pulse (!) 102   Temp 96.4 °F (35.8 °C)   Ht 6' 1\" (1.854 m)   Wt 86.6 kg (191 lb)   BMI 25.20 kg/m²       Physical Exam  Constitutional: Appears well-developed and well-nourished.   Eyes: Negative.   Neck: Negative.  Cardiovascular: Normal rate and regular rhythm.   Pulmonary/Chest: Effort normal and breath sounds normal.   Abdominal: Soft. Bowel sounds are normal. No distension and no mass. There is no tenderness. No hepatomegaly. No splenomegaly.    Musculoskeletal: Negative.  Neurological: Negative.  Skin: Negative.    Labs/Imaging    XR Abdomen 1 view on 03/03/2021 report shows    IMPRESSION:     Diffuse increased fecal retention throughout the colon likely compatible  with known history of constipation.    Colonoscopy on 11/29/2017 report shows    Impression:       - Preparation of the colon was fair.       - External and internal hemorrhoids.       - One 1 mm polyp in the sigmoid colon. Biopsied.       - One 4 mm polyp in the cecum, removed with a cold snare. Resected and       retrieved.    Pathology    A: Sigmoid polyp; polypectomy:   -  Hyperplastic colonic mucosa     B: Cecal polyp; polypectomy:   -  Tubulovillous adenoma     Component      Latest Ref Rng & Units 12/11/2020   FECAL OCCULT BLD IMMUNOCHEM      Negative Negative       Lab Results   Component Value Date    WBC 4.1 (L) 12/11/2020    RBC 4.49 (L) 12/11/2020    HGB 12.8 (L) 12/11/2020    HCT 40.7 12/11/2020    MCV 90.6 12/11/2020    MCH 28.5 12/11/2020    MCHC 31.4 (L) 12/11/2020     12/11/2020    TLYMPH 32 12/11/2020    PMON 9 12/11/2020    PEOS 2 12/11/2020    PBASO 1 12/11/2020    ANEUT 2.3 12/11/2020    ALYMS 1.3 12/11/2020    SANDHYA 0.4 12/11/2020 
Phone call
   AEOS 0.1 12/11/2020    ABASO 0.0 12/11/2020    NEUT NOT APPLICABLE 02/10/2019    TDIF AUTOMATED DIFFERENTIAL 02/10/2019    IANC NOT APPLICABLE 02/10/2019    NRBCRE 0 12/11/2020    PIMGR 0 02/10/2019    AIMGR 0.0 02/10/2019       Lab Results   Component Value Date    SODIUM 141 12/11/2020    POTASSIUM 3.6 12/11/2020    CHLORIDE 105 12/11/2020    CO2 29 12/11/2020    BUN 14 12/11/2020    CREATININE 0.93 12/11/2020    CALCIUM 9.5 12/11/2020    ALBUMIN 3.7 12/11/2020    BILIRUBIN 0.7 12/11/2020    ALKPT 85 12/11/2020    GPT 32 12/11/2020    AST 21 12/11/2020    GLUCOSE 171 (H) 12/11/2020       Lab Results   Component Value Date    CHOLESTEROL 161 12/11/2020    CHOLESTEROL 256 (H) 01/17/2020    CHOLESTEROL 240 (H) 07/29/2019    TRIGLYCERIDE 66 12/11/2020    TRIGLYCERIDE 178 (H) 01/17/2020    TRIGLYCERIDE 117 07/29/2019    HDL 71 12/11/2020    HDL 47 01/17/2020    HDL 46 07/29/2019    CALCLDL 77 12/11/2020    CALCLDL 173 (H) 01/17/2020    CALCLDL 171 (H) 07/29/2019       Lab Results   Component Value Date    ALKPT 85 12/11/2020    BILIRUBIN 0.7 12/11/2020    ALBUMIN 3.7 12/11/2020    GPT 32 12/11/2020    AST 21 12/11/2020       Lab Results   Component Value Date    HGBA1C 6.4 (H) 12/11/2020    HGBA1C 8.8 (H) 01/17/2020    HGBA1C 9.5 (H) 07/29/2019       No results found for: SEPT, ERYT, LEUK, UBACTR, HCAST    Lab Results   Component Value Date    TSH 3.091 10/11/2017       Lab Results   Component Value Date    VITD25 30.5 10/30/2015       No results found for: VB12    No results found for: COL, UAPP, USPG, UPH, UPROT, UGLU, UKET, UROB, UBILI, UNITR, SEPT, ERYT, LEUK, UBACTR, HCAST, SPTYU    ASSESSMENT:     Rajan is a 78 year old male with:    1. Recent change in bowel habits  2. Hx of colon polyps in December 2017      Plan    1. Repeat colonoscopy  2. Plan colonoscopy of August 2021  3. Prep is Max Citrate + Gatorade/Miralax.  4. Ordered TSH level    Return after colonoscopy.    The plan which includes procedure(s) 
and/or treatment was discussed with the patient. Risks, benefits, alternatives, and all questions/concerns were answered. Patient expressed their understanding and is in agreement with the plan.    On 4/29/2021, ITahmina and Vicente Parikh scribed the services personally performed by Magui Roblero MD.    The scribe's documentation has been prepared under my direction and personally reviewed by me in its entirety. I confirm that the note above accurately reflects all work, treatment, procedures, and medical decision making performed by me.  
Phone call

## 2024-09-03 NOTE — CASE MANAGEMENT PROGRESS NOTE - NSCMPROGRESSNOTE_GEN_ALL_CORE
Discussed pt on rounds, pt remains acute, pending PICC line for IV home abx. Pt on IV Cefazolin. CM will continue to collaborate with interdisciplinary team and remain available to assist.

## 2024-09-03 NOTE — PROGRESS NOTE ADULT - TIME BILLING
Reviewing chart notes and data, reviewing telemetry monitor records, face to face time counseling the patient, coordinating care with SW/CM at [facility].
direct patient care including but not limited to reviewing chart, medications ,laboratory data, imaging reports, discussion of plan of care with consultants on the case, coordination of care with multidisciplinary team involved in the case and discussion of plan with patient.  Patient and family agreeable to plan of care and verbalized understanding the anticipated hospital course and treatment plan.

## 2024-09-03 NOTE — PRE PROCEDURE NOTE - PRE PROCEDURE EVALUATION
Interventional Radiology    HPI: 89y Male with PMH of DM type 2 insulin dependent , HTN, HLD, BPH and gout who presented to ED due to L knee pain that started about 2 days ago and noticed that it was getting swollen and warm to touch so he decided to come to ED. Patient found to have small joint effusion on L knee, s/p arthrocentesis to evaluate for inflammatory arthropathy vs prosthetic joint infection. Joint fluid findings appear concerning for pseudogout, PJI seems less likely. MRI done to further evaluate area concerning for focal osteomyelitis along the anteromedial margin of the tibial component at the level of the metaphysis. MRI concerning for a phlegmonous collectionwith underlying osteomyelitis.  Per ID, will require PICC line for long term IV Abx.    Allergies: No Known Allergies    Medications (Abx/Cardiac/Anticoagulation/Blood Products)  aspirin enteric coated: 81 milliGRAM(s) Oral (09-03 @ 11:56)  ceFAZolin   IVPB: 100 mL/Hr IV Intermittent (09-03 @ 06:29)  cefepime   IVPB: 100 mL/Hr IV Intermittent (09-02 @ 13:31)  heparin   Injectable: 5000 Unit(s) SubCutaneous (09-03 @ 06:29)  losartan: 100 milliGRAM(s) Oral (09-03 @ 06:29)  NIFEdipine XL: 60 milliGRAM(s) Oral (09-03 @ 06:31)  vancomycin  IVPB: 250 mL/Hr IV Intermittent (09-01 @ 22:12)    Data:    T(C): 36.4  HR: 82  BP: 118/68  RR: 18  SpO2: 98%    Exam  General: No acute distress  Chest: Non labored breathing  Abdomen: Non-distended  Extremities: No swelling, warm    -WBC 6.89 / HgB 11.6 / Hct 36.1 / Plt 170  -Na 143 / Cl 112 / BUN 28 / Glucose 136  -K 4.3 / CO2 24 / Cr 1.40  -ALT -- / Alk Phos -- / T.Bili --  -INR0.96    Imaging: reviewed    Plan: 89y Male presents for PICC line  -Risks/Benefits/alternatives explained with the patient and/or healthcare proxy and witnessed informed consent obtained.

## 2024-09-03 NOTE — PROGRESS NOTE ADULT - PROVIDER SPECIALTY LIST ADULT
Hospitalist
Infectious Disease
Nephrology
Orthopedics
Orthopedics
Nephrology
Infectious Disease
Nephrology
Hospitalist

## 2024-09-03 NOTE — PROGRESS NOTE ADULT - ASSESSMENT
89 Y.O.M with PMH of DM type 2 insulin dependent , HTN, HLD, BPH and gout who presented to ED due to L knee pain that started about 2 days ago and noticed that it was getting swollen and warm to touch so he decided to come to ED. Seen with Dr. Reyez at bedside, who provided translation. Patient of note pt had traumatic LLE wound on the calf area (from metal bed rail) on May 2024. He had debridement with Dr. zuniga and was following in wound clinic, wound appears healed now. Denies any pain elsewhere. Denies any fevers or chills.    Patient found to have small joint effusion on L knee, s/p arthrocentesis to evaluate for inflammatory arthropathy vs prosthetic joint infection. Joint fluid findings appear concerning for pseudogout, PJI seems less likely. Cell count 427, joint PCR negative and cultures remain no growth.    MRI done to further evaluate area concerning for focal osteomyelitis along the anteromedial margin of the tibial component at the level of the metaphysis. MRI concerning for a phlegmonous collectionwith underlying osteomyelitis. Suggest Ortho re-evaluation to determine if any further intervention needed or if tissue sampling can be done. Otherwise no fever and no leukocytosis. Baseline CRP is 12-17.    #Left knee pain  #L knee effusion  #L tibial osteomyelitis    -suggest cefazolin for now  -discontinue vancomycin and cefepime  -follow cultures to completion  -Ortho follow up regarding MRI fininds  -discussed with Dr. Dereje Wolff MD  Division of Infectious Diseases   Please call ID service at [phone] with any question.      35 minutes spent on total encounter assessing patient, examination, chart review, counseling and coordinating care by the attending physician/nurse/care manager. 
Prerenal azotemia, CKD 3  LLE cellulitis, Left knee OM  Diabetes  Hypertension    Stable renal indices. To continue current meds. On ARB. Trend BP and titrate BP meds as needed.   IV abx, ID follow up. Monitor blood sugar levels. Insulin coverage as needed. Dietary restriction. Ortho follow up.   Will follow electrolytes and renal function trend. D/c planning. 
  A/P    # LLE cellulitis  and pseudogout   - CT LLE: Status post left knee total arthroplasty with CT evidence for interval   development of focal osteomyelitis along the anteromedial margin of the   tibial component at the level of the metaphysis. Overlying soft tissue   phlegmon without definite drainable abscess on this non-IV contrast   enhanced examination  - s/p left knee aspiration by orthopedic  8/30 : total cell count 476 , CPPD cystal  - trend for fever and leukocytosis   - elevated  ESR and CRP  - on vanco and cefepime   - discussed with ortho   -  discussed with ID     # lactic acidosis , resolved   s/p 2L NS in ED    #HTN/HLD  - c/w home nifedipine   - c/w home losartan and if cr worsen to consider changing it   - c/w home atorvastatin     # CKD   baseline per previous labs from may 2024 , serum cr was 1.6   renal eval noted  Renal indices improved today     # DM  - home meds :on insulin lantus 12-16 , januvia and pioglitazone   - hold all home meds   - increase lantus 5 U at bedtime , inuslin low SS  - POCT fingersticks pre meals and at bedtime , adjust insulin dose according to BG fingersticks , target BG level during admission 100-180       # BPH     c/w home flomax     # gout   c/w home allopurinol     DVT ppx: heparin SQ  full code   diet : soft bite size carb consistent 
  A/P    # LLE cellulitis  and pseudogout   - CT LLE: Status post left knee total arthroplasty with CT evidence for interval   development of focal osteomyelitis along the anteromedial margin of the   tibial component at the level of the metaphysis. Overlying soft tissue   phlegmon without definite drainable abscess on this non-IV contrast   enhanced examination  - s/p left knee aspiration by orthopedic  8/30 : total cell count 476 , CPPD cystal, culture no growth   - elevated  ESR and CRP  - on vanco and cefepime   - discussed with ortho   -  discussed with ID     # lactic acidosis , resolved   s/p 2L NS in ED    #HTN/HLD  - c/w home nifedipine   - c/w home losartan and if cr worsen to consider changing it   - c/w home atorvastatin     # CKD   baseline per previous labs from may 2024 , serum cr was 1.6   renal eval noted  Renal indices improved today     # DM  - home meds :on insulin lantus 12-16 , januvia and pioglitazone   - hold all home meds   - increase lantus 5 U at bedtime , inuslin low SS  - POCT fingersticks pre meals and at bedtime , adjust insulin dose according to BG fingersticks , target BG level during admission 100-180       # BPH     c/w home flomax     # gout   c/w home allopurinol     DVT ppx: heparin SQ  full code   diet : soft bite size carb consistent 
  A/P    # LLE cellulitis/Left proximal tibia OM   and pseudogout   - CT LLE: Status post left knee total arthroplasty with CT evidence for interval   development of focal osteomyelitis along the anteromedial margin of the   tibial component at the level of the metaphysis. Overlying soft tissue   phlegmon without definite drainable abscess on this non-IV contrast   enhanced examination  - s/p left knee aspiration by orthopedic  8/30 : total cell count 476 , CPPD cystal, culture no growth ,joint fluid PCR negative.   - elevated  ESR and CRP  - on vanco and cefepime->ancef    - discussed with ortho , no surgical intervention,  agree with IV antibiotic and follow up out patient.   -  discussed with ID   picc line requested.     # lactic acidosis , resolved   s/p 2L NS in ED    #HTN/HLD  - c/w home nifedipine   - c/w home losartan and if cr worsen to consider changing it   - c/w home atorvastatin     # CKD   baseline per previous labs from may 2024 , serum cr was 1.6   renal eval noted  Renal indices improved today     # DM  - home meds :on insulin lantus 12-16 , januvia and pioglitazone   - hold all home meds   - increase lantus 5 U at bedtime , inuslin low SS  - POCT fingersticks pre meals and at bedtime , adjust insulin dose according to BG fingersticks , target BG level during admission 100-180       # BPH     c/w home flomax     # gout   c/w home allopurinol     DVT ppx: heparin SQ  full code   diet : soft bite size carb consistent 
89 Y.O.M with PMH of DM type 2 insulin dependent , HTN, HLD, BPH and gout who presented to ED due to L knee pain that started about 2 days ago and noticed that it was getting swollen and warm to touch so he decided to come to ED. Seen with Dr. Reyez at bedside, who provided translation. Patient of note pt had traumatic LLE wound on the calf area (from metal bed rail) on May 2024. He had debridement with Dr. zuniga and was following in wound clinic, wound appears healed now. Denies any pain elsewhere. Denies any fevers or chills.    Patient found to have small joint effusion on L knee, s/p arthrocentesis to evaluate for inflammatory arthropathy vs prosthetic joint infection. Joint fluid findings appear concerning for pseudogout, PJI seems less likely. Cell count 427, joint PCR negative and cultures remain no growth.    MRI done to further evaluate area concerning for focal osteomyelitis along the anteromedial margin of the tibial component at the level of the metaphysis. MRI concerning for a phlegmonous collection with underlying osteomyelitis. Per Ortho no plan for inpatient intervention, plan for follow up as outpatient. Will treat empirically with 6 weeks of IV antibiotics, then repeat in 8 weeks with follow up. Family agreeable to do home infusion of IV antibiotics.    Otherwise no fever and no leukocytosis. Baseline CRP is 12-17. Blood cultures currently no growth.    #Left knee pain  #L knee effusion  #L tibial osteomyelitis    -continue cefazolin while here, upon discharge switch to ceftriaxone 2g IV q24h until 10/10/24  -suggest PICC line  -weekly CBC, CMP, CRP on discharge  -repeat MRI LLE around mid-late October 2024  -ID and Orthopedic follow up on discharge    Clover Wolff MD  Division of Infectious Diseases   Please call ID service at [phone] with any question.      35 minutes spent on total encounter assessing patient, examination, chart review, counseling and coordinating care by the attending physician/nurse/care manager.         
A/P    # LLE cellulitis/Left proximal tibia OM   and pseudogout   - CT LLE: Status post left knee total arthroplasty with CT evidence for interval   development of focal osteomyelitis along the anteromedial margin of the   tibial component at the level of the metaphysis. Overlying soft tissue   phlegmon without definite drainable abscess on this non-IV contrast   enhanced examination  - s/p left knee aspiration by orthopedic  8/30 : total cell count 476 , CPPD cystal, culture no growth ,joint fluid PCR negative.   - elevated  ESR and CRP  - Continue IV Ancef for 6 weeks. PICC line ordered  - discussed with ortho , no surgical intervention,  agree with IV antibiotic and follow up out patient.   - discussed with ID recs appreciated    # lactic acidosis , resolved   s/p 2L NS in ED    #HTN  #HLD  - c/w home nifedipine   - c/w home losartan and if cr worsen to consider changing it   - c/w home atorvastatin   - monitor vitals    # CKD   baseline per previous labs from may 2024 , serum cr was 1.6   renal consulted, recs appreciated    # DM Type 2  - home meds :on insulin lantus 12-16 , januvia and pioglitazone   - hold all home meds   - Continue lantus 5 U at bedtime , inuslin low SS  - POCT fingersticks pre meals and at bedtime , adjust insulin dose according to BG fingersticks , target BG level during admission 100-180       # BPH   c/w home flomax     # gout   c/w home allopurinol     DVT ppx: heparin SQ      DC planning likely tomorrow pending PICC line placement and teaching.    
CAR: Prerenal azotemia  LLE cellulitis, Left knee OM  Diabetes  Hypertension    Renal indices stable. To continue current meds. On ARB. Monitor creatinine trend. Trend BP and titrate BP meds as needed.   IV abx, ID follow up. Monitor blood sugar levels. Insulin coverage as needed. Dietary restriction. Ortho follow up.   Will follow electrolytes and renal function trend. 
CAR: Prerenal azotemia  LLE cellulitis, Left knee OM  Diabetes  Hypertension    Stable renal indices. To continue current meds. On ARB. Trend BP and titrate BP meds as needed.   IV abx, ID follow up. Monitor blood sugar levels. Insulin coverage as needed. Dietary restriction. Ortho follow up.   Will follow electrolytes and renal function trend. 
  A/P    # LLE cellulitis   - CT LLE: Status post left knee total arthroplasty with CT evidence for interval   development of focal osteomyelitis along the anteromedial margin of the   tibial component at the level of the metaphysis. Overlying soft tissue   phlegmon without definite drainable abscess on this non-IV contrast   enhanced examination  - f/u blood cx   - s/p left knee aspiration by orthopedic , f/u result   -f/u blood and aspiration culture   - trend for fever and leukocytosis   - elevated  ESR and CRP  - s/p vanco and Zosyn in ED , given CKD changed Zosyn to meropenem    - discussed with ortho   -  discussed with ID     # lactic acidosis , resolved   s/p 2L NS in ED    #HTN/HLD  - c/w home nifedipine   - c/w home losartan and if cr worsen to consider changing it   - c/w home atorvastatin     # CKD   baseline per previous labs from may 2024 , serum cr was 1.6   renal eval noted  Renal indices improved today     # DM  - home meds :on insulin lantus 12-16 , januvia and pioglitazone   - hold all home meds   = start lantus 5 U at bedtime , inuslin low SS  - POCT fingersticks pre meals and at bedtime , adjust insulin dose according to BG fingersticks , target BG level during admission 100-180       # BPH     c/w home flomax     # gout   c/w home allopurinol     DVT ppx: heparin SQ  full code   diet : soft bite size carb consistent

## 2024-09-03 NOTE — PROGRESS NOTE ADULT - SUBJECTIVE AND OBJECTIVE BOX
Patient is a 89y old  Male who presents with a chief complaint of IV Abx (03 Sep 2024 11:32)      INTERVAL HPI/OVERNIGHT EVENTS: Patient seen and examined at bedside. No overnight events.    MEDICATIONS  (STANDING):  allopurinol 100 milliGRAM(s) Oral daily  aspirin enteric coated 81 milliGRAM(s) Oral daily  atorvastatin 10 milliGRAM(s) Oral at bedtime  ceFAZolin   IVPB 2000 milliGRAM(s) IV Intermittent every 12 hours  dextrose 5%. 1000 milliLiter(s) (50 mL/Hr) IV Continuous <Continuous>  dextrose 5%. 1000 milliLiter(s) (100 mL/Hr) IV Continuous <Continuous>  dextrose 50% Injectable 25 Gram(s) IV Push once  dextrose 50% Injectable 12.5 Gram(s) IV Push once  dextrose 50% Injectable 25 Gram(s) IV Push once  glucagon  Injectable 1 milliGRAM(s) IntraMuscular once  heparin   Injectable 5000 Unit(s) SubCutaneous every 8 hours  insulin glargine Injectable (LANTUS) 7 Unit(s) SubCutaneous at bedtime  insulin lispro (ADMELOG) corrective regimen sliding scale   SubCutaneous three times a day before meals  loratadine 10 milliGRAM(s) Oral daily  losartan 100 milliGRAM(s) Oral daily  NIFEdipine XL 60 milliGRAM(s) Oral daily  tamsulosin 0.4 milliGRAM(s) Oral at bedtime    MEDICATIONS  (PRN):  acetaminophen     Tablet .. 650 milliGRAM(s) Oral every 6 hours PRN Mild Pain (1 - 3)  dextrose Oral Gel 15 Gram(s) Oral once PRN Blood Glucose LESS THAN 70 milliGRAM(s)/deciliter  traMADol 25 milliGRAM(s) Oral every 6 hours PRN Moderate Pain (4 - 6)  traMADol 50 milliGRAM(s) Oral every 6 hours PRN Severe Pain (7 - 10)      Allergies    No Known Allergies    Intolerances        REVIEW OF SYSTEMS:  CONSTITUTIONAL: No fever or chills  HEENT:  No headache, no sore throat  RESPIRATORY: No cough, wheezing, or shortness of breath  CARDIOVASCULAR: No chest pain, palpitations  GASTROINTESTINAL: No abd pain, nausea, vomiting, or diarrhea  GENITOURINARY: No dysuria, frequency, or hematuria  NEUROLOGICAL: no focal weakness or dizziness  MUSCULOSKELETAL: no myalgias     Vital Signs Last 24 Hrs  T(C): 36.4 (03 Sep 2024 11:57), Max: 36.5 (02 Sep 2024 20:36)  T(F): 97.6 (03 Sep 2024 11:57), Max: 97.7 (02 Sep 2024 20:36)  HR: 82 (03 Sep 2024 11:57) (64 - 98)  BP: 118/68 (03 Sep 2024 11:57) (114/70 - 132/74)  BP(mean): --  RR: 18 (03 Sep 2024 11:57) (18 - 18)  SpO2: 98% (03 Sep 2024 11:57) (92% - 98%)    Parameters below as of 03 Sep 2024 11:57  Patient On (Oxygen Delivery Method): room air      I&O's Summary    02 Sep 2024 07:01  -  03 Sep 2024 07:00  --------------------------------------------------------  IN: 0 mL / OUT: 1 mL / NET: -1 mL      BMI (kg/m2): 24.3 (08-29-24 @ 19:23)    PHYSICAL EXAM:  GENERAL: NAD  HEENT:  AT/NC, anicteric, moist mucous membranes, EOMI, PERRL, no lid-lag, conjunctiva and sclera clear  CHEST/LUNG:  CTA b/l, no rales, wheezes, or rhonchi,  normal respiratory effort, no intercostal retractions  HEART:  RRR, S1, S2, no murmurs; no pitting edema  ABDOMEN:  BS+, soft, nontender, nondistended; No HSM  MSK/EXTREMITIES: 2+ peripheral pulses, no clubbing or cyanosis  NERVOUS SYSTEM: answers questions and follows commands appropriately, A&Ox3 grossly moves all extremities   PSYCH: Appropriate affect, Alert & Awake; Good judgement      LABS: Personally reviewed  CBC                        11.6   6.89  )-----------( 170      ( 03 Sep 2024 05:54 )             36.1     CMP  09-03    143  |  112  |  28  ----------------------------<  136  4.3   |  24  |  1.40    Ca    9.1      03 Sep 2024 05:54                                    POCT Blood Glucose.: 212 mg/dL (03 Sep 2024 11:31)  POCT Blood Glucose.: 111 mg/dL (03 Sep 2024 07:21)  POCT Blood Glucose.: 162 mg/dL (02 Sep 2024 21:04)  POCT Blood Glucose.: 147 mg/dL (02 Sep 2024 16:53)      Urinalysis Basic - ( 03 Sep 2024 05:54 )    Color: x / Appearance: x / SG: x / pH: x  Gluc: 136 mg/dL / Ketone: x  / Bili: x / Urobili: x   Blood: x / Protein: x / Nitrite: x   Leuk Esterase: x / RBC: x / WBC x   Sq Epi: x / Non Sq Epi: x / Bacteria: x        Culture - Blood (collected 29 Aug 2024 20:50)  Source: .Blood Blood-Peripheral  Preliminary Report (03 Sep 2024 01:01):    No growth at 4 days    Culture - Blood (collected 29 Aug 2024 20:45)  Source: .Blood Blood-Peripheral  Preliminary Report (03 Sep 2024 01:01):    No growth at 4 days            Culture - Joint (collected 08-30-24 @ 01:45)  Source: Joint Fl Synovial Fluid  Gram Stain (08-30-24 @ 12:57):    No polymorphonuclear cells seen per low power field    No organisms seen per oil power field  Preliminary Report (08-31-24 @ 09:08):    No growth to date.    Culture - Blood (collected 08-29-24 @ 20:50)  Source: .Blood Blood-Peripheral  Preliminary Report (09-03-24 @ 01:01):    No growth at 4 days    Culture - Blood (collected 08-29-24 @ 20:45)  Source: .Blood Blood-Peripheral  Preliminary Report (09-03-24 @ 01:01):    No growth at 4 days        RADIOLOGY & ADDITIONAL TESTS: Personally reviewed.     Consultant(s) Notes Reviewed:  [x] YES  [ ] NO   Discussed with BOY/MERT, RN

## 2024-09-03 NOTE — CAREGIVER ENGAGEMENT NOTE - CAREGIVER OUTREACH NOTES - FREE TEXT
CM met w pt. CM attempted to use  phone with [facility] ID # [id number]. Pt kept repeating "am I staying here" to . CM notified bedside nurse who stated pt has had difficulty with  phone 2/2 being [place]. Pt alert, but is not appropriately responding to . CM spoke with son Markus Osuna via telephone [phone] to complete CCA.  
CM spoke with son to discuss transition of care. Pt son stated he would llike pt to come home with home care services. Per pt son he will be coming in at 6pm to learn IV abx administration with primary bedside nurse. Pt son chose [facility] [phone] and [facility] [phone]. CM to sent appropriate referrals

## 2024-09-04 ENCOUNTER — TRANSCRIPTION ENCOUNTER (OUTPATIENT)
Age: 89
End: 2024-09-04

## 2024-09-04 VITALS
DIASTOLIC BLOOD PRESSURE: 64 MMHG | RESPIRATION RATE: 18 BRPM | SYSTOLIC BLOOD PRESSURE: 105 MMHG | HEART RATE: 80 BPM | TEMPERATURE: 97 F | OXYGEN SATURATION: 98 %

## 2024-09-04 LAB
ANION GAP SERPL CALC-SCNC: 6 MMOL/L — SIGNIFICANT CHANGE UP (ref 5–17)
BUN SERPL-MCNC: 35 MG/DL — HIGH (ref 7–23)
CALCIUM SERPL-MCNC: 9.3 MG/DL — SIGNIFICANT CHANGE UP (ref 8.5–10.1)
CHLORIDE SERPL-SCNC: 111 MMOL/L — HIGH (ref 96–108)
CO2 SERPL-SCNC: 25 MMOL/L — SIGNIFICANT CHANGE UP (ref 22–31)
CREAT SERPL-MCNC: 1.3 MG/DL — SIGNIFICANT CHANGE UP (ref 0.5–1.3)
CRP SERPL-MCNC: 14 MG/L — HIGH
CULTURE RESULTS: SIGNIFICANT CHANGE UP
CULTURE RESULTS: SIGNIFICANT CHANGE UP
EGFR: 53 ML/MIN/1.73M2 — LOW
GLUCOSE SERPL-MCNC: 137 MG/DL — HIGH (ref 70–99)
HCT VFR BLD CALC: 35.9 % — LOW (ref 39–50)
HGB BLD-MCNC: 11.9 G/DL — LOW (ref 13–17)
MCHC RBC-ENTMCNC: 33.1 GM/DL — SIGNIFICANT CHANGE UP (ref 32–36)
MCHC RBC-ENTMCNC: 33.2 PG — SIGNIFICANT CHANGE UP (ref 27–34)
MCV RBC AUTO: 100.3 FL — HIGH (ref 80–100)
MRSA PCR RESULT.: SIGNIFICANT CHANGE UP
NRBC # BLD: 0 /100 WBCS — SIGNIFICANT CHANGE UP (ref 0–0)
PLATELET # BLD AUTO: 185 K/UL — SIGNIFICANT CHANGE UP (ref 150–400)
POTASSIUM SERPL-MCNC: 3.8 MMOL/L — SIGNIFICANT CHANGE UP (ref 3.5–5.3)
POTASSIUM SERPL-SCNC: 3.8 MMOL/L — SIGNIFICANT CHANGE UP (ref 3.5–5.3)
RBC # BLD: 3.58 M/UL — LOW (ref 4.2–5.8)
RBC # FLD: 13 % — SIGNIFICANT CHANGE UP (ref 10.3–14.5)
S AUREUS DNA NOSE QL NAA+PROBE: SIGNIFICANT CHANGE UP
SODIUM SERPL-SCNC: 142 MMOL/L — SIGNIFICANT CHANGE UP (ref 135–145)
SPECIMEN SOURCE: SIGNIFICANT CHANGE UP
SPECIMEN SOURCE: SIGNIFICANT CHANGE UP
WBC # BLD: 6.99 K/UL — SIGNIFICANT CHANGE UP (ref 3.8–10.5)
WBC # FLD AUTO: 6.99 K/UL — SIGNIFICANT CHANGE UP (ref 3.8–10.5)

## 2024-09-04 PROCEDURE — 89060 EXAM SYNOVIAL FLUID CRYSTALS: CPT

## 2024-09-04 PROCEDURE — 87070 CULTURE OTHR SPECIMN AEROBIC: CPT

## 2024-09-04 PROCEDURE — 87641 MR-STAPH DNA AMP PROBE: CPT

## 2024-09-04 PROCEDURE — 80202 ASSAY OF VANCOMYCIN: CPT

## 2024-09-04 PROCEDURE — 36573 INSJ PICC RS&I 5 YR+: CPT

## 2024-09-04 PROCEDURE — 82962 GLUCOSE BLOOD TEST: CPT

## 2024-09-04 PROCEDURE — 83615 LACTATE (LD) (LDH) ENZYME: CPT

## 2024-09-04 PROCEDURE — 87075 CULTR BACTERIA EXCEPT BLOOD: CPT

## 2024-09-04 PROCEDURE — 99285 EMERGENCY DEPT VISIT HI MDM: CPT | Mod: 25

## 2024-09-04 PROCEDURE — 87205 SMEAR GRAM STAIN: CPT

## 2024-09-04 PROCEDURE — 73718 MRI LOWER EXTREMITY W/O DYE: CPT | Mod: MC

## 2024-09-04 PROCEDURE — 83036 HEMOGLOBIN GLYCOSYLATED A1C: CPT

## 2024-09-04 PROCEDURE — 97162 PT EVAL MOD COMPLEX 30 MIN: CPT

## 2024-09-04 PROCEDURE — 97116 GAIT TRAINING THERAPY: CPT

## 2024-09-04 PROCEDURE — 85027 COMPLETE CBC AUTOMATED: CPT

## 2024-09-04 PROCEDURE — 87640 STAPH A DNA AMP PROBE: CPT

## 2024-09-04 PROCEDURE — 36415 COLL VENOUS BLD VENIPUNCTURE: CPT

## 2024-09-04 PROCEDURE — 80048 BASIC METABOLIC PNL TOTAL CA: CPT

## 2024-09-04 PROCEDURE — 87999 UNLISTED MICROBIOLOGY PX: CPT

## 2024-09-04 PROCEDURE — C1751: CPT

## 2024-09-04 PROCEDURE — 89051 BODY FLUID CELL COUNT: CPT

## 2024-09-04 PROCEDURE — 76937 US GUIDE VASCULAR ACCESS: CPT

## 2024-09-04 PROCEDURE — 86140 C-REACTIVE PROTEIN: CPT

## 2024-09-04 PROCEDURE — 73700 CT LOWER EXTREMITY W/O DYE: CPT | Mod: MC

## 2024-09-04 PROCEDURE — 85610 PROTHROMBIN TIME: CPT

## 2024-09-04 PROCEDURE — 87015 SPECIMEN INFECT AGNT CONCNTJ: CPT

## 2024-09-04 PROCEDURE — 93005 ELECTROCARDIOGRAM TRACING: CPT

## 2024-09-04 PROCEDURE — 85025 COMPLETE CBC W/AUTO DIFF WBC: CPT

## 2024-09-04 PROCEDURE — 85652 RBC SED RATE AUTOMATED: CPT

## 2024-09-04 PROCEDURE — 80053 COMPREHEN METABOLIC PANEL: CPT

## 2024-09-04 PROCEDURE — 83605 ASSAY OF LACTIC ACID: CPT

## 2024-09-04 PROCEDURE — 99239 HOSP IP/OBS DSCHRG MGMT >30: CPT

## 2024-09-04 PROCEDURE — 85730 THROMBOPLASTIN TIME PARTIAL: CPT

## 2024-09-04 PROCEDURE — 73562 X-RAY EXAM OF KNEE 3: CPT

## 2024-09-04 PROCEDURE — 97530 THERAPEUTIC ACTIVITIES: CPT

## 2024-09-04 PROCEDURE — 87040 BLOOD CULTURE FOR BACTERIA: CPT

## 2024-09-04 RX ORDER — INSULIN GLARGINE 100 [IU]/ML
7 INJECTION, SOLUTION SUBCUTANEOUS
Qty: 0 | Refills: 0 | DISCHARGE
Start: 2024-09-04

## 2024-09-04 RX ORDER — ACETAMINOPHEN 325 MG/1
2 TABLET ORAL
Qty: 0 | Refills: 0 | DISCHARGE
Start: 2024-09-04

## 2024-09-04 RX ORDER — INSULIN GLARGINE 100 [IU]/ML
0 INJECTION, SOLUTION SUBCUTANEOUS
Refills: 0 | DISCHARGE

## 2024-09-04 RX ORDER — TRAMADOL HYDROCHLORIDE 200 MG/1
0.5 TABLET, EXTENDED RELEASE ORAL
Qty: 10 | Refills: 0
Start: 2024-09-04

## 2024-09-04 RX ORDER — LORATADINE 10 MG/1
1 CAPSULE, LIQUID FILLED ORAL
Qty: 0 | Refills: 0 | DISCHARGE
Start: 2024-09-04

## 2024-09-04 RX ADMIN — Medication 100 MILLIGRAM(S): at 11:47

## 2024-09-04 RX ADMIN — Medication 5000 UNIT(S): at 14:33

## 2024-09-04 RX ADMIN — Medication 100 MILLIGRAM(S): at 06:10

## 2024-09-04 RX ADMIN — Medication 81 MILLIGRAM(S): at 11:47

## 2024-09-04 RX ADMIN — LOSARTAN POTASSIUM 100 MILLIGRAM(S): 50 TABLET ORAL at 06:10

## 2024-09-04 RX ADMIN — CHLORHEXIDINE GLUCONATE 1 APPLICATION(S): 40 SOLUTION TOPICAL at 06:12

## 2024-09-04 RX ADMIN — Medication 1: at 11:46

## 2024-09-04 RX ADMIN — LORATADINE 10 MILLIGRAM(S): 10 CAPSULE, LIQUID FILLED ORAL at 11:47

## 2024-09-04 RX ADMIN — Medication 5000 UNIT(S): at 06:10

## 2024-09-04 RX ADMIN — NIFEDIPINE 60 MILLIGRAM(S): 60 TABLET, FILM COATED, EXTENDED RELEASE ORAL at 06:10

## 2024-09-04 NOTE — CASE MANAGEMENT PROGRESS NOTE - NSCMPROGRESSNOTE_GEN_ALL_CORE
Spoke with son , he is aware pt is discharged and [facility] [phone]  will be in for infusion instruction tmrw. He is receptive to learn and will  patient later this afternoon . DC needs in place.

## 2024-09-04 NOTE — DISCHARGE NOTE NURSING/CASE MANAGEMENT/SOCIAL WORK - PATIENT PORTAL LINK FT
You can access the FollowMyHealth Patient Portal offered by [facility] by registering at the following website: [email or web address]. By joining [employer]’s FollowMyHealth portal, you will also be able to view your health information using other applications (apps) compatible with our system.

## 2024-09-04 NOTE — DISCHARGE NOTE NURSING/CASE MANAGEMENT/SOCIAL WORK - NSDCPEFALRISK_GEN_ALL_CORE
Received message c/o left leg discomfort & reported going to ER over the weekend. Called him back & he reported not feeling well since procedure. Per patient his left lower leg is 2.5 inches larger than his right leg. Taking doxycycline per ED order. Denies chest discomfort. Advised to go to ED with any chest discomfort. He request call back from Structural Heart team. Sent message to [facility] Np's to follow up with him. Damaso De La Fuente NP will follow up.  
For information on Fall & Injury Prevention, visit: [email or web address] OR  [email or web address] OR  https://www.cdc.gov/steadi/patient.html

## 2024-09-04 NOTE — CHART NOTE - NSCHARTNOTEFT_GEN_A_CORE
Orthopedics    Left knee re-aspirated this morning to obtain cell count and crystal markers. Gram stain/culture pending from overnight aspiration.     Procedure:  The benefits and risks of joint aspiration were explained to the patient in his native language using a , whom agreed to proceed with aspiration. Under aspetic conditions, 10 cc of serosanguinous fluid was removed from the left knee joint. This fluid was distributed to a lavender top laboratory tube for cell count, and a red top laboratory tube for crystal analysis. The patient tolerated the procedure well and there were no complications. Patient was neurovascularly intact after the procedure.    Please keep patient NPO until cell count returns. Discussed with Dr. Wick
Requested Micro to add on joint PCR to initial fluid sample, joint PCR negative. Fluid cell count only 427, awaiting crystals. Seems less likely infected joint, will await further fluid studies. Consider further evaluation of osteomyelitis at the level of tibial metaphysis and phlegmon with MRI. If cultures remain no growth, suggest switching vancomycin and cefepime to cefazolin 2g IV q8h.    Clover Wolff MD  Division of Infectious Diseases   Please call ID service at [phone] with any question.
Spoke with son Markus on the phone. Discussed plan with IV antibiotics, but also will need repeat MRI and ID/Ortho follow up. He says he would not be able to take patient to the ID office, but can at least do a telephone visit on 9/25. ID office phone number provided. Son in agreement with plan.     Clover Wolff MD  Division of Infectious Diseases   Please call ID service at [phone] with any question.

## 2024-09-10 ENCOUNTER — NON-APPOINTMENT (OUTPATIENT)
Age: 89
End: 2024-09-10

## 2024-09-12 ENCOUNTER — NON-APPOINTMENT (OUTPATIENT)
Age: 89
End: 2024-09-12

## 2024-09-13 ENCOUNTER — NON-APPOINTMENT (OUTPATIENT)
Age: 89
End: 2024-09-13

## 2024-09-17 ENCOUNTER — NON-APPOINTMENT (OUTPATIENT)
Age: 89
End: 2024-09-17

## 2024-09-25 ENCOUNTER — APPOINTMENT (OUTPATIENT)
Dept: INFECTIOUS DISEASE | Facility: CLINIC | Age: 89
End: 2024-09-25

## 2024-09-25 DIAGNOSIS — M86.662 OTHER CHRONIC OSTEOMYELITIS, LEFT TIBIA AND FIBULA: ICD-10-CM

## 2024-09-25 PROCEDURE — XXXXX: CPT | Mod: 1L

## 2024-10-01 ENCOUNTER — NON-APPOINTMENT (OUTPATIENT)
Age: 89
End: 2024-10-01

## 2024-10-03 NOTE — PHARMACOTHERAPY INTERVENTION NOTE - COMMENTS
Patient is a 89 year old male on vancomycin 1000mg q24h for joint infection and tibial OM. Per order set, trough ordered for 9/2 2200 (pre-5th dose since patient got 1000mg X 1 on 8/29). Discussed w/ Dr. Reyez and recommended obtaining a trough prior to tonight's dose. MD agreed and level ordered for 2200 today. 
52.2
Patient is a 89y M presenting with suspected cellulitis, r/o PJI being treated empirically with vancomycin 1000 mg q24h + cefepime. Vancomycin trough 8/31 = 9.3 ug/mL, with AUC/NANDO = 430 hr*ug/mL. Vancomycin level within range, recommend vancomycin trough 9/2 at 22:00 for continued pharmacokinetic monitoring.    Breonna Abad, PharmD  Clinical Pharmacy Specialist   [phone] or Teams

## 2024-10-30 ENCOUNTER — APPOINTMENT (OUTPATIENT)
Dept: MRI IMAGING | Facility: CLINIC | Age: 89
End: 2024-10-30

## 2024-10-30 PROCEDURE — 73720 MRI LWR EXTREMITY W/O&W/DYE: CPT | Mod: LT

## 2024-10-30 PROCEDURE — A9585: CPT

## 2024-11-06 ENCOUNTER — NON-APPOINTMENT (OUTPATIENT)
Age: 89
End: 2024-11-06

## 2024-11-26 ENCOUNTER — NON-APPOINTMENT (OUTPATIENT)
Age: 89
End: 2024-11-26

## 2024-11-26 ENCOUNTER — EMERGENCY (EMERGENCY)
Facility: HOSPITAL | Age: 89
LOS: 1 days | Discharge: HOME | End: 2024-11-26
Attending: STUDENT IN AN ORGANIZED HEALTH CARE EDUCATION/TRAINING PROGRAM | Admitting: STUDENT IN AN ORGANIZED HEALTH CARE EDUCATION/TRAINING PROGRAM
Payer: COMMERCIAL

## 2024-11-26 VITALS
DIASTOLIC BLOOD PRESSURE: 68 MMHG | HEIGHT: 66 IN | OXYGEN SATURATION: 98 % | WEIGHT: 151.9 LBS | SYSTOLIC BLOOD PRESSURE: 114 MMHG | TEMPERATURE: 98 F | HEART RATE: 81 BPM | RESPIRATION RATE: 20 BRPM

## 2024-11-26 DIAGNOSIS — Z98.49 CATARACT EXTRACTION STATUS, UNSPECIFIED EYE: Chronic | ICD-10-CM

## 2024-11-26 LAB
ALBUMIN SERPL ELPH-MCNC: 3.7 G/DL — SIGNIFICANT CHANGE UP (ref 3.3–5)
ALP SERPL-CCNC: 87 U/L — SIGNIFICANT CHANGE UP (ref 40–120)
ALT FLD-CCNC: 35 U/L — SIGNIFICANT CHANGE UP (ref 12–78)
ANION GAP SERPL CALC-SCNC: 9 MMOL/L — SIGNIFICANT CHANGE UP (ref 5–17)
APTT BLD: 31.7 SEC — SIGNIFICANT CHANGE UP (ref 24.5–35.6)
AST SERPL-CCNC: 26 U/L — SIGNIFICANT CHANGE UP (ref 15–37)
BASOPHILS # BLD AUTO: 0.02 K/UL — SIGNIFICANT CHANGE UP (ref 0–0.2)
BASOPHILS NFR BLD AUTO: 0.3 % — SIGNIFICANT CHANGE UP (ref 0–2)
BILIRUB SERPL-MCNC: 0.2 MG/DL — SIGNIFICANT CHANGE UP (ref 0.2–1.2)
BUN SERPL-MCNC: 34 MG/DL — HIGH (ref 7–23)
CALCIUM SERPL-MCNC: 9.5 MG/DL — SIGNIFICANT CHANGE UP (ref 8.5–10.1)
CHLORIDE SERPL-SCNC: 109 MMOL/L — HIGH (ref 96–108)
CO2 SERPL-SCNC: 25 MMOL/L — SIGNIFICANT CHANGE UP (ref 22–31)
CREAT SERPL-MCNC: 1.8 MG/DL — HIGH (ref 0.5–1.3)
EGFR: 36 ML/MIN/1.73M2 — LOW
EOSINOPHIL # BLD AUTO: 0.1 K/UL — SIGNIFICANT CHANGE UP (ref 0–0.5)
EOSINOPHIL NFR BLD AUTO: 1.4 % — SIGNIFICANT CHANGE UP (ref 0–6)
ERYTHROCYTE [SEDIMENTATION RATE] IN BLOOD: 14 MM/HR — SIGNIFICANT CHANGE UP (ref 0–20)
GLUCOSE SERPL-MCNC: 197 MG/DL — HIGH (ref 70–99)
HCT VFR BLD CALC: 40.4 % — SIGNIFICANT CHANGE UP (ref 39–50)
HGB BLD-MCNC: 13.7 G/DL — SIGNIFICANT CHANGE UP (ref 13–17)
IMM GRANULOCYTES NFR BLD AUTO: 0.3 % — SIGNIFICANT CHANGE UP (ref 0–0.9)
INR BLD: 0.95 RATIO — SIGNIFICANT CHANGE UP (ref 0.85–1.16)
LACTATE SERPL-SCNC: 1.4 MMOL/L — SIGNIFICANT CHANGE UP (ref 0.7–2)
LACTATE SERPL-SCNC: 2.3 MMOL/L — HIGH (ref 0.7–2)
LYMPHOCYTES # BLD AUTO: 1.78 K/UL — SIGNIFICANT CHANGE UP (ref 1–3.3)
LYMPHOCYTES # BLD AUTO: 25.1 % — SIGNIFICANT CHANGE UP (ref 13–44)
MCHC RBC-ENTMCNC: 33.1 PG — SIGNIFICANT CHANGE UP (ref 27–34)
MCHC RBC-ENTMCNC: 33.9 G/DL — SIGNIFICANT CHANGE UP (ref 32–36)
MCV RBC AUTO: 97.6 FL — SIGNIFICANT CHANGE UP (ref 80–100)
MONOCYTES # BLD AUTO: 0.7 K/UL — SIGNIFICANT CHANGE UP (ref 0–0.9)
MONOCYTES NFR BLD AUTO: 9.9 % — SIGNIFICANT CHANGE UP (ref 2–14)
NEUTROPHILS # BLD AUTO: 4.47 K/UL — SIGNIFICANT CHANGE UP (ref 1.8–7.4)
NEUTROPHILS NFR BLD AUTO: 63 % — SIGNIFICANT CHANGE UP (ref 43–77)
NRBC # BLD: 0 /100 WBCS — SIGNIFICANT CHANGE UP (ref 0–0)
PLATELET # BLD AUTO: 160 K/UL — SIGNIFICANT CHANGE UP (ref 150–400)
POTASSIUM SERPL-MCNC: 3.4 MMOL/L — LOW (ref 3.5–5.3)
POTASSIUM SERPL-SCNC: 3.4 MMOL/L — LOW (ref 3.5–5.3)
PROT SERPL-MCNC: 7.6 G/DL — SIGNIFICANT CHANGE UP (ref 6–8.3)
PROTHROM AB SERPL-ACNC: 11.2 SEC — SIGNIFICANT CHANGE UP (ref 9.9–13.4)
RBC # BLD: 4.14 M/UL — LOW (ref 4.2–5.8)
RBC # FLD: 14.6 % — HIGH (ref 10.3–14.5)
SODIUM SERPL-SCNC: 143 MMOL/L — SIGNIFICANT CHANGE UP (ref 135–145)
WBC # BLD: 7.09 K/UL — SIGNIFICANT CHANGE UP (ref 3.8–10.5)
WBC # FLD AUTO: 7.09 K/UL — SIGNIFICANT CHANGE UP (ref 3.8–10.5)

## 2024-11-26 PROCEDURE — 86140 C-REACTIVE PROTEIN: CPT

## 2024-11-26 PROCEDURE — 99285 EMERGENCY DEPT VISIT HI MDM: CPT

## 2024-11-26 PROCEDURE — 96374 THER/PROPH/DIAG INJ IV PUSH: CPT

## 2024-11-26 PROCEDURE — 85730 THROMBOPLASTIN TIME PARTIAL: CPT

## 2024-11-26 PROCEDURE — 36415 COLL VENOUS BLD VENIPUNCTURE: CPT

## 2024-11-26 PROCEDURE — 83605 ASSAY OF LACTIC ACID: CPT

## 2024-11-26 PROCEDURE — 85610 PROTHROMBIN TIME: CPT

## 2024-11-26 PROCEDURE — 99284 EMERGENCY DEPT VISIT MOD MDM: CPT | Mod: 25

## 2024-11-26 PROCEDURE — 85025 COMPLETE CBC W/AUTO DIFF WBC: CPT

## 2024-11-26 PROCEDURE — 80053 COMPREHEN METABOLIC PANEL: CPT

## 2024-11-26 PROCEDURE — 73562 X-RAY EXAM OF KNEE 3: CPT | Mod: 26,LT

## 2024-11-26 PROCEDURE — 73562 X-RAY EXAM OF KNEE 3: CPT

## 2024-11-26 PROCEDURE — 87040 BLOOD CULTURE FOR BACTERIA: CPT

## 2024-11-26 PROCEDURE — 85652 RBC SED RATE AUTOMATED: CPT

## 2024-11-26 RX ORDER — CEFADROXIL 250 MG/5ML
1 POWDER, FOR SUSPENSION ORAL
Qty: 14 | Refills: 0
Start: 2024-11-26 | End: 2024-12-02

## 2024-11-26 RX ORDER — TAMSULOSIN HCL 0.4 MG
1 CAPSULE ORAL
Refills: 0 | DISCHARGE

## 2024-11-26 RX ORDER — SODIUM CHLORIDE 9 MG/ML
1000 INJECTION, SOLUTION INTRAMUSCULAR; INTRAVENOUS; SUBCUTANEOUS ONCE
Refills: 0 | Status: COMPLETED | OUTPATIENT
Start: 2024-11-26 | End: 2024-11-26

## 2024-11-26 RX ORDER — CEFAZOLIN SODIUM 1 G
1000 VIAL (EA) INJECTION ONCE
Refills: 0 | Status: COMPLETED | OUTPATIENT
Start: 2024-11-26 | End: 2024-11-26

## 2024-11-26 RX ADMIN — SODIUM CHLORIDE 1000 MILLILITER(S): 9 INJECTION, SOLUTION INTRAMUSCULAR; INTRAVENOUS; SUBCUTANEOUS at 22:04

## 2024-11-26 RX ADMIN — Medication 100 MILLIGRAM(S): at 22:42

## 2024-11-26 NOTE — CONSULT NOTE ADULT - ATTENDING COMMENTS
88yo w hx of TKA pw distal tibial fluid - has presented to [facility] ER and had abx with some improvement in past.  Knee stable on exam, xr unchanged.  Nonseptic and stable vitals - rec outpt eval with my joints colleague for further workup and to discuss potential interventions

## 2024-11-26 NOTE — ED PROVIDER NOTE - OBJECTIVE STATEMENT
89-year-old male with history of DM type 2 insulin dependent , HTN, HLD, BPH and gout,  left TKA in 2017 presents with complaint of redness and swelling below left knee today.  Patient was seen for similar symptoms in 08/2024, was seen by Ortho and had left knee aspiration and was told he did not have a septic joint of osteomyelitis but pseudogout, admitted for left lower extremity cellulitis, was on IV Vanco and cefepime, discharged home with PICC line for 5 weeks of IV ceftriaxone with improvement of symptoms.  States that patient had follow-up 2 weeks ago for outpatient MRI leg and was told by ID, Dr. Clover Wolff at that his findings were unchanged and was being monitored.  States that they noticed mild redness and swelling below the left knee again today, spoke with Dr. Wolff who advised patient to come to the ER for evaluation. States that pt had knee pain last time he was admitted however does not have knee pain today. Denies fever, chills, knee/leg pain, trauma/fall, calf pain/swelling or other symptoms.

## 2024-11-26 NOTE — ED PROVIDER NOTE - CARE PROVIDER_API CALL
Jose Daniel Fry  Orthopaedic Surgery  [address], [address]  [place] [address]  Phone: [phone]  Fax: [phone]  Follow Up Time: 1-3 Days    Clover Wolff  Internal Medicine  [address] [place] [address]  Phone: [phone]  Fax: [phone]  Follow Up Time: 1-3 Days

## 2024-11-26 NOTE — ED PROVIDER NOTE - CARE PROVIDERS DIRECT ADDRESSES
[email or web address]

## 2024-11-26 NOTE — ED PROVIDER NOTE - PATIENT PORTAL LINK FT
You can access the FollowMyHealth Patient Portal offered by [facility] by registering at the following website: [email or web address]. By joining [facility]’s FollowMyHealth portal, you will also be able to view your health information using other applications (apps) compatible with our system.

## 2024-11-26 NOTE — ED PROVIDER NOTE - PROVIDER TOKENS
PROVIDER:[TOKEN:[[id number]],FOLLOWUP:[1-3 Days]],PROVIDER:[TOKEN:[[id number]],FOLLOWUP:[1-3 Days]]

## 2024-11-26 NOTE — ED PROVIDER NOTE - NSICDXPASTMEDICALHX_GEN_ALL_CORE_FT
PAST MEDICAL HISTORY:  BPH (benign prostatic hyperplasia)     DM2 (diabetes mellitus, type 2)     Gout     History of gastroesophageal reflux (GERD)     HLD (hyperlipidemia)     HTN (hypertension)     Prostate CA Radiation seed Implant    Shingles 8 years ago    
Statement Selected

## 2024-11-26 NOTE — ED ADULT NURSE NOTE - NSFALLHARMRISKINTERV_ED_ALL_ED

## 2024-11-26 NOTE — ED ADULT NURSE NOTE - OBJECTIVE STATEMENT
89 y.o male presents to ED with chief complaint of knee pain injury. Pt recently treated August 24 for infection left leg.  previous  PICC line placed and received 5 weeks of antibiotics. PICC line removed first week of October.  family concerned that there may be fluid in the bone.  Patient  developed bump under left knee medial area red and warm to touch. No tenderness noted.

## 2024-11-26 NOTE — ED PROVIDER NOTE - NSTIMEPROVIDERCAREINITIATE_GEN_ER
Attending Attestation (For Attendings USE Only)... 26-Nov-2024 19:04 Attending Attestation (For Attendings USE Only).../Scribe Attestation (For Scribes USE Only)...

## 2024-11-26 NOTE — ED ADULT TRIAGE NOTE - CHIEF COMPLAINT QUOTE
patient comes in with complaints to left knee.  He was seen / treated back in August 24 for infection left leg.  He had a PICC line and received 5 weeks of antibiotics. PICC line removed first week of October.  patient then went on cruise.  Patient returned, had a scan for f/u on leg and Dr. Chen (infections disesase MD) called and is concerned that there may be fluid in the bone??  Patient also developed bump over the left knee.  no complaints of pain.  was told to come in for evaluation

## 2024-11-26 NOTE — ED PROVIDER NOTE - MUSCULOSKELETAL, MLM
L leg: +small palpable effusion noted near the distal incision near the proximal tibia left side with mild overlying erythema, no increased warmth noted, skin intact, no streaking noted, Left knee NT with FROM. old surgical scar noted to left knee, NT left hip with FROM. NT left ankle with FROM. calf NT, good movement of the toes and ankle. NVI distally, distal pulse palpable.

## 2024-11-26 NOTE — ED PROVIDER NOTE - PROGRESS NOTE DETAILS
Spoke to MARILU Barnes earlier who had requested Ortho consult and dispo as per ortho recommendations.   Patient seen by orthopedic PA, Shimon in ED, was advised that patient does not require a tap at this time as he has no WBC or elevated sed rate and has no complaints of knee pain like last time.  Advised patient does not need any acute orthopedic intervention at this time and can follow-up with Dr. Fry within 1 week.

## 2024-11-26 NOTE — ED PROVIDER NOTE - CLINICAL SUMMARY MEDICAL DECISION MAKING FREE TEXT BOX
Here with redness of leg and proximity to knee replacement with prior cellulitis requiring PICC line.  Differential inclusive of cellulitis, rule out septic arthritis.  Check labs, x-ray, Ortho eval, as outpatient versus admission.

## 2024-11-26 NOTE — CONSULT NOTE ADULT - SUBJECTIVE AND OBJECTIVE BOX
Pt Name: MARTHA MELCHOR    MRN: [id number]    HPI: Patient is a 89y Male set in by MARILU Reyez for evaluation of  left knee distal cellulitis. Pt was seen on 8/31 for similar issue at that time left knee was tapped and pt was told he did not have a septic joint of osteomyelitis but pseudogout. Was discharged with 5 weeks of IV abx with picc line. Pt son said the knee improved and suddenly it became red a raised again. Denies any fever or chills. Pt is not septic        PAST MEDICAL & SURGICAL HISTORY:  HTN (hypertension)      DM2 (diabetes mellitus, type 2)      BPH (benign prostatic hyperplasia)      Prostate CA  Radiation seed Implant      HLD (hyperlipidemia)      Gout      History of gastroesophageal reflux (GERD)      Shingles  8 years ago      S/P cataract surgery  right eye          Allergies: No Known Allergies      Ambulation: Baseline Ambulation  independent              13.7   7.09  )-----------( 160      ( 26 Nov 2024 20:10 )             40.4     11-26    143  |  109[H]  |  34[H]  ----------------------------<  197[H]  3.4[L]   |  25  |  1.80[H]    Ca    9.5      26 Nov 2024 20:10    TPro  7.6  /  Alb  3.7  /  TBili  0.2  /  DBili  x   /  AST  26  /  ALT  35  /  AlkPhos  87  11-26    PT/INR - ( 26 Nov 2024 20:10 )   PT: 11.2 sec;   INR: 0.95 ratio         PTT - ( 26 Nov 2024 20:10 )  PTT:31.7 sec      PHYSICAL EXAM:    Vital Signs Last 24 Hrs  T(C): 36.7 (26 Nov 2024 19:04), Max: 36.7 (26 Nov 2024 19:04)  T(F): 98.1 (26 Nov 2024 19:04), Max: 98.1 (26 Nov 2024 19:04)  HR: 81 (26 Nov 2024 19:04) (81 - 81)  BP: 114/68 (26 Nov 2024 19:04) (114/68 - 114/68)  BP(mean): --  RR: 20 (26 Nov 2024 19:04) (20 - 20)  SpO2: 98% (26 Nov 2024 19:04) (98% - 98%)    Parameters below as of 26 Nov 2024 19:04  Patient On (Oxygen Delivery Method): room air        Physical Exam:  General: NAD, Alert, Awake and oriented  Respiratory: Symmetric chest wall expansion bilaterally, no accessory muscle use  Skin- intact- small palpable effusion noted near the distal incision near the proximal tibia left side. NOT IN THE JOINT   Abdomen- Soft and NT/ND     LEFT LE: Left knee NT with FROM. NT left hip with FROM. NT left ankle with FROM. good movement of the toes and ankle. NVI distally, distal pulse palpable.       Imaging: xray shows hardware intact. follow up official read     Orthopedic A/P:    Pt is a  89y Male with left knee cellulitis, chronic. Pt does not require a tap has he has no WBC or elevated sed rate. has no complaints of knee pain like last time       -Pain control PRN  - WBAT  - ABX as per ED  - no acute orthopedic intervention needed at this time please reconsult if needed   -Follow up with Dr. العراقي within 1 week. Please call the office to make an appointment.   -Discussed with Dr. Ga who is aware and approves of plan.      Pt Name: MARTHA MELCHOR    MRN: [id number]    HPI: Patient is a 89y Male set in by MARILU Reyez for evaluation of  left knee distal cellulitis. Pt was seen on 8/31 for similar issue at that time left knee was tapped and pt was told he did not have a septic joint of osteomyelitis but pseudogout. Was discharged with 5 weeks of IV abx with picc line. Pt son said the knee improved and suddenly it became red a raised again. Denies any fever or chills. Pt is not septic        PAST MEDICAL & SURGICAL HISTORY:  HTN (hypertension)      DM2 (diabetes mellitus, type 2)      BPH (benign prostatic hyperplasia)      Prostate CA  Radiation seed Implant      HLD (hyperlipidemia)      Gout      History of gastroesophageal reflux (GERD)      Shingles  8 years ago      S/P cataract surgery  right eye          Allergies: No Known Allergies      Ambulation: Baseline Ambulation  independent              13.7   7.09  )-----------( 160      ( 26 Nov 2024 20:10 )             40.4     11-26    143  |  109[H]  |  34[H]  ----------------------------<  197[H]  3.4[L]   |  25  |  1.80[H]    Ca    9.5      26 Nov 2024 20:10    TPro  7.6  /  Alb  3.7  /  TBili  0.2  /  DBili  x   /  AST  26  /  ALT  35  /  AlkPhos  87  11-26    PT/INR - ( 26 Nov 2024 20:10 )   PT: 11.2 sec;   INR: 0.95 ratio         PTT - ( 26 Nov 2024 20:10 )  PTT:31.7 sec      PHYSICAL EXAM:    Vital Signs Last 24 Hrs  T(C): 36.7 (26 Nov 2024 19:04), Max: 36.7 (26 Nov 2024 19:04)  T(F): 98.1 (26 Nov 2024 19:04), Max: 98.1 (26 Nov 2024 19:04)kno  HR: 81 (26 Nov 2024 19:04) (81 - 81)  BP: 114/68 (26 Nov 2024 19:04) (114/68 - 114/68)  BP(mean): --  RR: 20 (26 Nov 2024 19:04) (20 - 20)  SpO2: 98% (26 Nov 2024 19:04) (98% - 98%)    Parameters below as of 26 Nov 2024 19:04  Patient On (Oxygen Delivery Method): room air        Physical Exam:  General: NAD, Alert, Awake and oriented  Respiratory: Symmetric chest wall expansion bilaterally, no accessory muscle use  Skin- intact- small palpable effusion noted near the distal incision near the proximal tibia left side. NOT IN THE JOINT   Abdomen- Soft and NT/ND     LEFT LE: Left knee NT with FROM. NT left hip with FROM. NT left ankle with FROM. good movement of the toes and ankle. NVI distally, distal pulse palpable.       Imaging: xray shows hardware intact. follow up official read     Orthopedic A/P:    Pt is a  89y Male with left knee cellulitis, chronic. Pt does not require a tap has he has no WBC or elevated sed rate. has no complaints of knee pain like last time       -Pain control PRN  - WBAT  - ABX as per ED  - no acute orthopedic intervention needed at this time please reconsult if needed   -Follow up with Dr. العراقي within 1 week. Please call the office to make an appointment.   -Discussed with Dr. Ga who is aware and approves of plan.

## 2024-11-26 NOTE — ED PROVIDER NOTE - NSFOLLOWUPINSTRUCTIONS_ED_ALL_ED_FT
Cellulitis, Adult  A person's legs and feet. One leg is normal and the other leg is affected by cellulitis.  Cellulitis is a skin infection. The infected area is usually warm, red, swollen, and tender. It most commonly occurs on the lower body, such as the legs, feet, and toes, but this condition can occur on any part of the body. The infection can travel to the muscles, blood, and underlying tissue and become life-threatening without treatment. It is important to get medical treatment right away for this condition.    What are the causes?  Cellulitis is caused by bacteria. The bacteria enter through a break in the skin, such as a cut, burn, insect or animal bite, open sore, or crack.    What increases the risk?  This condition is more likely to occur in people who:  Have a weak body's defense system (immune system).  Are older than 60 years old.  Have diabetes.  Have a type of long-term (chronic) liver disease (cirrhosis) or kidney disease.  Are obese.  Have a skin condition such as:  An itchy rash, such as eczema or psoriasis.  A fungal rash on the feet or in skinfolds.  Blistering rashes, such as shingles or chickenpox.  Slow movement of blood in the veins (venous stasis).  Fluid buildup below the skin (edema).  Have open wounds on the skin, such as cuts, puncture wounds, burns, bites, scrapes, tattoos, piercings, or wounds from surgery.  Have had radiation therapy.  Use IV drugs.  What are the signs or symptoms?  Symptoms of this condition include:  Skin that looks red, purple, or slightly darker than your usual skin color.  Streaks or spots on the skin.  Swollen area of the skin.  Tenderness or pain when an area of the skin is touched.  Warm skin.  Fever or chills.  Blisters.  Tiredness (fatigue).  How is this diagnosed?  This condition is diagnosed based on a medical history and physical exam. You may also have tests, including:  Blood tests.  Imaging tests.  Tests on a sample of fluid taken from the wound (wound culture).  How is this treated?  Treatment for this condition may include:  Medicines. These may include antibiotics or medicines to treat allergies (antihistamines).  Rest.  Applying cold or warm wet cloths (compresses) to the skin.  If the condition is severe, you may need to stay in the hospital and get antibiotics through an IV.  The infection usually starts to get better within 1–2 days of treatment.    Follow these instructions at home:  Medicines    Take over-the-counter and prescription medicines only as told by your health care provider.  If you were prescribed antibiotics, take them as told by your provider. Do not stop using the antibiotic even if you start to feel better.  General instructions    Drink enough fluid to keep your pee (urine) pale yellow.  Do not touch or rub the infected area.  Raise (elevate) the infected area above the level of your heart while you are sitting or lying down.  Return to your normal activities as told by your provider. Ask your provider what activities are safe for you.  Apply warm or cold compresses to the affected area as told by your provider.  Keep all follow-up visits. Your provider will need to make sure that a more serious infection is not developing.  Contact a health care provider if:  You have a fever.  Your symptoms do not improve within 1–2 days of starting treatment or you develop new symptoms.  Your bone or joint underneath the infected area becomes painful after the skin has healed.  Your infection returns in the same area or another area. Signs of this may include:  You notice a swollen bump in the infected area.  Your red area gets larger, turns dark in color, or becomes more painful.  Drainage increases.  Pus or a bad smell develops in your infected area.  You have more pain.  You feel ill and have muscle aches and weakness.  You develop vomiting or diarrhea that will not go away.  Get help right away if:  You notice red streaks coming from the infected area.  You notice the skin turns purple or black and falls off.  This symptom may be an emergency. Get help right away. Call 911.  Do not wait to see if the symptom will go away.  Do not drive yourself to the hospital.

## 2024-11-27 VITALS
SYSTOLIC BLOOD PRESSURE: 131 MMHG | TEMPERATURE: 98 F | OXYGEN SATURATION: 94 % | RESPIRATION RATE: 18 BRPM | HEART RATE: 68 BPM | DIASTOLIC BLOOD PRESSURE: 68 MMHG

## 2024-11-27 LAB — CRP SERPL-MCNC: <3 MG/L — SIGNIFICANT CHANGE UP

## 2024-12-10 ENCOUNTER — APPOINTMENT (OUTPATIENT)
Dept: ORTHOPEDIC SURGERY | Facility: CLINIC | Age: 89
End: 2024-12-10
Payer: MEDICARE

## 2024-12-10 DIAGNOSIS — L03.116 CELLULITIS OF LEFT LOWER LIMB: ICD-10-CM

## 2024-12-10 PROCEDURE — 99204 OFFICE O/P NEW MOD 45 MIN: CPT

## 2024-12-10 RX ORDER — CEFADROXIL 500 MG/1
500 CAPSULE ORAL TWICE DAILY
Qty: 14 | Refills: 0 | Status: ACTIVE | COMMUNITY
Start: 2024-12-10 | End: 1900-01-01

## 2025-01-09 ENCOUNTER — APPOINTMENT (OUTPATIENT)
Dept: ORTHOPEDIC SURGERY | Facility: CLINIC | Age: 89
End: 2025-01-09

## 2025-01-09 PROCEDURE — 99213 OFFICE O/P EST LOW 20 MIN: CPT

## 2025-01-09 NOTE — PATIENT PROFILE ADULT - MST ORDER GENERATE MLM HIDDEN
Render Risk Assessment In Note?: no
Detail Level: Detailed
Note Text (......Xxx Chief Complaint.): This diagnosis correlates with the
Other (Free Text): Discussed doing a biopsy if it is not healing
MST Score 2 or >

## 2025-05-02 NOTE — CONSULT NOTE ADULT - PROBLEM/RECOMMENDATION-2
Medication: atorvastatin (LIPITOR) 80 MG table  passed protocol.   Last office visit date: 4/18/2025    Next appointment scheduled?: 10/20/2025     Number of refills given: 1     DISPLAY PLAN FREE TEXT

## 2025-06-06 NOTE — PATIENT PROFILE ADULT - FALL HARM RISK CONCLUSION
Discharge Instructions :  Care After Your Catheterization Procedure Using the Leg Site (SoftSeal Hemostasis Pad)    Activity  For the next 24 hours:  Follow these guidelines related to the sedation medicine that you've received.   You must have someone drive you home.  You may feel tired, unsteady, or not quite yourself for the remainder of the day due to the sedation medicine. Your body gets rid of the medicine usually in 24 hours.  Do not drive or operate machinery or power tools.  Do not drink alcohol or smoke.  Do not make any important decisions or sign important papers.      For the next 48 hours:  Follow these guidelines related to the puncture site in your leg.  No heavy lifting (over 10 lbs)  Avoid pushing or pulling motions, such as vacuuming, mowing, snow blowing or shoveling.  No exercise, sports, or sexual activity.  Use stairs only when needed; if using stairs, lead with the unaffected leg.  Return to your daily routine (except for the restrictions listed above) and do not favor the leg used for the procedure.  If you do heavy physical work on your job, follow your doctor's instructions about when you may return to work.  When you cough, sneeze or strain (as with a bowel movement), hold pressure on the leg puncture site to lessen the chance of bleeding.    Care of the procedure site with a SoftSeal Hemostasis Pad  Keep the dressing in place for 24 hours.  You may remove the clear dressing and gauze after 24 hours.   Soak SoftSeal pad in water (in shower or with very damp clean wash cloth) and gently remove. If pad does not come off easily, apply more water.   Cleanse the site gently with soap and water, using a clean washcloth, then pat dry; Apply no lotion, ointments or creams.  Apply band aid and change daily for 2 days.  Do not sit in the bathtub, hot tub or a pool of water until the wound has completely healed (3-5 days).    Common side effects you may notice  Soreness at puncture site.  Slight  bruising at the site for several days to several weeks.  Lump the size of a pea or marble at the puncture site for a few weeks.    Diet/Fluids  Resume diet as prior to admission.  If you had an angiogram, catheterization, or stent, drink plenty of liquids to help flush the dye used for your procedure out of your body (unless you're on a fluid restriction ordered by your physician).    Medication  Take mild pain reliever such as Tylenol/Acetaminophen as needed for pain.    Call your doctor if you have  Significant bleeding from the procedure site. If bleeding occurs or there is a growing lump at your site, lie down and apply firm pressure at the site where your dressing is. Call 911 and keep pressure on the site.  Numbness, tingling or color change in the leg used for puncture site.  Increasing pain and firmness near the puncture site.  A temperature greater than 101 degrees.  Redness or drainage around site.  If you have questions, call your doctor.       Fall with Harm Risk
